# Patient Record
Sex: FEMALE | Race: WHITE | NOT HISPANIC OR LATINO | Employment: OTHER | URBAN - METROPOLITAN AREA
[De-identification: names, ages, dates, MRNs, and addresses within clinical notes are randomized per-mention and may not be internally consistent; named-entity substitution may affect disease eponyms.]

---

## 2023-02-19 PROBLEM — I10 BENIGN ESSENTIAL HYPERTENSION: Status: ACTIVE | Noted: 2023-02-19

## 2023-02-19 RX ORDER — CYCLOBENZAPRINE HCL 5 MG
1 TABLET ORAL EVERY 8 HOURS PRN
COMMUNITY
Start: 2020-10-15 | End: 2024-02-15 | Stop reason: ALTCHOICE

## 2023-02-19 RX ORDER — ONDANSETRON 4 MG/1
1 TABLET, FILM COATED ORAL EVERY 8 HOURS PRN
COMMUNITY
Start: 2022-08-25 | End: 2024-05-31 | Stop reason: SDUPTHER

## 2023-02-19 RX ORDER — DULOXETIN HYDROCHLORIDE 20 MG/1
1 CAPSULE, DELAYED RELEASE ORAL DAILY
COMMUNITY
Start: 2022-11-28 | End: 2023-03-21 | Stop reason: SDUPTHER

## 2023-02-19 RX ORDER — IBUPROFEN 200 MG
1 TABLET ORAL DAILY
COMMUNITY
Start: 2016-02-04 | End: 2023-03-21

## 2023-02-19 RX ORDER — BLOOD SUGAR DIAGNOSTIC
STRIP MISCELLANEOUS
COMMUNITY
Start: 2022-04-20 | End: 2023-08-17 | Stop reason: SDUPTHER

## 2023-02-19 RX ORDER — ATORVASTATIN CALCIUM 40 MG/1
1 TABLET, FILM COATED ORAL NIGHTLY
COMMUNITY
Start: 2013-02-28 | End: 2023-03-21 | Stop reason: SDUPTHER

## 2023-02-19 RX ORDER — METOPROLOL SUCCINATE 50 MG/1
1 TABLET, EXTENDED RELEASE ORAL DAILY
COMMUNITY
Start: 2022-09-19 | End: 2023-03-16 | Stop reason: SDUPTHER

## 2023-02-19 RX ORDER — DULAGLUTIDE 1.5 MG/.5ML
INJECTION, SOLUTION SUBCUTANEOUS
COMMUNITY
Start: 2016-10-27 | End: 2023-03-21 | Stop reason: ALTCHOICE

## 2023-02-19 RX ORDER — LANCETS 28 GAUGE
EACH MISCELLANEOUS
COMMUNITY
Start: 2022-04-20 | End: 2023-08-16 | Stop reason: SDUPTHER

## 2023-02-19 RX ORDER — LOSARTAN POTASSIUM 50 MG/1
1 TABLET ORAL DAILY
COMMUNITY
Start: 2021-02-09 | End: 2023-03-21 | Stop reason: SDUPTHER

## 2023-02-23 ENCOUNTER — TELEPHONE (OUTPATIENT)
Dept: PRIMARY CARE | Facility: CLINIC | Age: 73
End: 2023-02-23
Payer: MEDICARE

## 2023-03-15 ENCOUNTER — APPOINTMENT (OUTPATIENT)
Dept: LAB | Facility: LAB | Age: 73
End: 2023-03-15
Payer: MEDICARE

## 2023-03-15 LAB
ANION GAP IN SER/PLAS: 14 MMOL/L (ref 10–20)
CALCIUM (MG/DL) IN SER/PLAS: 8.4 MG/DL (ref 8.6–10.3)
CARBON DIOXIDE, TOTAL (MMOL/L) IN SER/PLAS: 23 MMOL/L (ref 21–32)
CHLORIDE (MMOL/L) IN SER/PLAS: 103 MMOL/L (ref 98–107)
CREATININE (MG/DL) IN SER/PLAS: 0.5 MG/DL (ref 0.5–1.05)
ERYTHROCYTE DISTRIBUTION WIDTH (RATIO) BY AUTOMATED COUNT: 15.5 % (ref 11.5–14.5)
ERYTHROCYTE MEAN CORPUSCULAR HEMOGLOBIN CONCENTRATION (G/DL) BY AUTOMATED: 31 G/DL (ref 32–36)
ERYTHROCYTE MEAN CORPUSCULAR VOLUME (FL) BY AUTOMATED COUNT: 96 FL (ref 80–100)
ERYTHROCYTES (10*6/UL) IN BLOOD BY AUTOMATED COUNT: 4.59 X10E12/L (ref 4–5.2)
GFR FEMALE: >90 ML/MIN/1.73M2
GLUCOSE (MG/DL) IN SER/PLAS: 92 MG/DL (ref 74–99)
HEMATOCRIT (%) IN BLOOD BY AUTOMATED COUNT: 43.9 % (ref 36–46)
HEMOGLOBIN (G/DL) IN BLOOD: 13.6 G/DL (ref 12–16)
INR IN PPP BY COAGULATION ASSAY: 0.9 (ref 0.9–1.1)
LEUKOCYTES (10*3/UL) IN BLOOD BY AUTOMATED COUNT: 8 X10E9/L (ref 4.4–11.3)
PLATELETS (10*3/UL) IN BLOOD AUTOMATED COUNT: 307 X10E9/L (ref 150–450)
POTASSIUM (MMOL/L) IN SER/PLAS: 3.4 MMOL/L (ref 3.5–5.3)
PROTHROMBIN TIME (PT) IN PPP BY COAGULATION ASSAY: 10.9 SEC (ref 9.8–13.4)
SODIUM (MMOL/L) IN SER/PLAS: 137 MMOL/L (ref 136–145)
UREA NITROGEN (MG/DL) IN SER/PLAS: 13 MG/DL (ref 6–23)

## 2023-03-16 DIAGNOSIS — I10 BENIGN ESSENTIAL HYPERTENSION: Primary | ICD-10-CM

## 2023-03-16 LAB
ABO GROUP (TYPE) IN BLOOD: NORMAL
ANTIBODY SCREEN: NORMAL
RH FACTOR: NORMAL

## 2023-03-16 RX ORDER — METOPROLOL SUCCINATE 50 MG/1
50 TABLET, EXTENDED RELEASE ORAL DAILY
Qty: 90 TABLET | Refills: 3 | Status: SHIPPED | OUTPATIENT
Start: 2023-03-16 | End: 2023-03-21 | Stop reason: SDUPTHER

## 2023-03-16 NOTE — TELEPHONE ENCOUNTER
Next ov 3/21  Pt compliamt  Pt will be out before appt  Pt uses [facility]x [facility] [place]

## 2023-03-18 LAB — URINE CULTURE: ABNORMAL

## 2023-03-20 LAB
ABO GROUP (TYPE) IN BLOOD: NORMAL
ALANINE AMINOTRANSFERASE (SGPT) (U/L) IN SER/PLAS: 9 U/L (ref 7–45)
ALBUMIN (G/DL) IN SER/PLAS: 3.7 G/DL (ref 3.4–5)
ALKALINE PHOSPHATASE (U/L) IN SER/PLAS: 92 U/L (ref 33–136)
ANION GAP IN SER/PLAS: 11 MMOL/L (ref 10–20)
ANTIBODY SCREEN: NORMAL
ASPARTATE AMINOTRANSFERASE (SGOT) (U/L) IN SER/PLAS: 16 U/L (ref 9–39)
BASOPHILS (10*3/UL) IN BLOOD BY AUTOMATED COUNT: 0.04 X10E9/L (ref 0–0.1)
BASOPHILS/100 LEUKOCYTES IN BLOOD BY AUTOMATED COUNT: 0.4 % (ref 0–2)
BILIRUBIN TOTAL (MG/DL) IN SER/PLAS: 0.7 MG/DL (ref 0–1.2)
CALCIUM (MG/DL) IN SER/PLAS: 8.7 MG/DL (ref 8.6–10.3)
CARBON DIOXIDE, TOTAL (MMOL/L) IN SER/PLAS: 26 MMOL/L (ref 21–32)
CHLORIDE (MMOL/L) IN SER/PLAS: 106 MMOL/L (ref 98–107)
CREATININE (MG/DL) IN SER/PLAS: 0.52 MG/DL (ref 0.5–1.05)
EOSINOPHILS (10*3/UL) IN BLOOD BY AUTOMATED COUNT: 0.07 X10E9/L (ref 0–0.7)
EOSINOPHILS/100 LEUKOCYTES IN BLOOD BY AUTOMATED COUNT: 0.7 % (ref 0–6)
ERYTHROCYTE DISTRIBUTION WIDTH (RATIO) BY AUTOMATED COUNT: 15.4 % (ref 11.5–14.5)
ERYTHROCYTE MEAN CORPUSCULAR HEMOGLOBIN CONCENTRATION (G/DL) BY AUTOMATED: 31.3 G/DL (ref 32–36)
ERYTHROCYTE MEAN CORPUSCULAR VOLUME (FL) BY AUTOMATED COUNT: 96 FL (ref 80–100)
ERYTHROCYTES (10*6/UL) IN BLOOD BY AUTOMATED COUNT: 4.37 X10E12/L (ref 4–5.2)
ESTIMATED AVERAGE GLUCOSE FOR HBA1C: 140 MG/DL
GFR FEMALE: >90 ML/MIN/1.73M2
GLUCOSE (MG/DL) IN SER/PLAS: 99 MG/DL (ref 74–99)
HEMATOCRIT (%) IN BLOOD BY AUTOMATED COUNT: 41.8 % (ref 36–46)
HEMOGLOBIN (G/DL) IN BLOOD: 13.1 G/DL (ref 12–16)
HEMOGLOBIN A1C/HEMOGLOBIN TOTAL IN BLOOD: 6.5 %
IMMATURE GRANULOCYTES/100 LEUKOCYTES IN BLOOD BY AUTOMATED COUNT: 0.3 % (ref 0–0.9)
LEUKOCYTES (10*3/UL) IN BLOOD BY AUTOMATED COUNT: 10.1 X10E9/L (ref 4.4–11.3)
LYMPHOCYTES (10*3/UL) IN BLOOD BY AUTOMATED COUNT: 1.92 X10E9/L (ref 1.2–4.8)
LYMPHOCYTES/100 LEUKOCYTES IN BLOOD BY AUTOMATED COUNT: 19.1 % (ref 13–44)
MONOCYTES (10*3/UL) IN BLOOD BY AUTOMATED COUNT: 0.67 X10E9/L (ref 0.1–1)
MONOCYTES/100 LEUKOCYTES IN BLOOD BY AUTOMATED COUNT: 6.7 % (ref 2–10)
NEUTROPHILS (10*3/UL) IN BLOOD BY AUTOMATED COUNT: 7.34 X10E9/L (ref 1.2–7.7)
NEUTROPHILS/100 LEUKOCYTES IN BLOOD BY AUTOMATED COUNT: 72.8 % (ref 40–80)
PLATELETS (10*3/UL) IN BLOOD AUTOMATED COUNT: 276 X10E9/L (ref 150–450)
POTASSIUM (MMOL/L) IN SER/PLAS: 3.3 MMOL/L (ref 3.5–5.3)
PROTEIN TOTAL: 6.5 G/DL (ref 6.4–8.2)
RH FACTOR: NORMAL
SODIUM (MMOL/L) IN SER/PLAS: 140 MMOL/L (ref 136–145)
UREA NITROGEN (MG/DL) IN SER/PLAS: 10 MG/DL (ref 6–23)

## 2023-03-21 ENCOUNTER — OFFICE VISIT (OUTPATIENT)
Dept: PRIMARY CARE | Facility: CLINIC | Age: 73
End: 2023-03-21
Payer: MEDICARE

## 2023-03-21 VITALS
WEIGHT: 118 LBS | DIASTOLIC BLOOD PRESSURE: 78 MMHG | TEMPERATURE: 97 F | OXYGEN SATURATION: 100 % | SYSTOLIC BLOOD PRESSURE: 130 MMHG | HEART RATE: 74 BPM | BODY MASS INDEX: 19.94 KG/M2

## 2023-03-21 DIAGNOSIS — K21.9 GASTROESOPHAGEAL REFLUX DISEASE WITHOUT ESOPHAGITIS: ICD-10-CM

## 2023-03-21 DIAGNOSIS — I10 BENIGN ESSENTIAL HYPERTENSION: ICD-10-CM

## 2023-03-21 DIAGNOSIS — F32.A DEPRESSIVE DISORDER: ICD-10-CM

## 2023-03-21 DIAGNOSIS — E11.9 TYPE 2 DIABETES MELLITUS WITHOUT COMPLICATION, WITH LONG-TERM CURRENT USE OF INSULIN (MULTI): ICD-10-CM

## 2023-03-21 DIAGNOSIS — J44.9 CHRONIC OBSTRUCTIVE PULMONARY DISEASE, UNSPECIFIED COPD TYPE (MULTI): ICD-10-CM

## 2023-03-21 DIAGNOSIS — Z79.4 TYPE 2 DIABETES MELLITUS WITHOUT COMPLICATION, WITH LONG-TERM CURRENT USE OF INSULIN (MULTI): ICD-10-CM

## 2023-03-21 DIAGNOSIS — E46 PROTEIN-CALORIE MALNUTRITION, UNSPECIFIED SEVERITY (MULTI): Primary | ICD-10-CM

## 2023-03-21 DIAGNOSIS — E78.2 MIXED HYPERLIPIDEMIA: ICD-10-CM

## 2023-03-21 PROCEDURE — 3075F SYST BP GE 130 - 139MM HG: CPT | Performed by: FAMILY MEDICINE

## 2023-03-21 PROCEDURE — 4010F ACE/ARB THERAPY RXD/TAKEN: CPT | Performed by: FAMILY MEDICINE

## 2023-03-21 PROCEDURE — 1159F MED LIST DOCD IN RCRD: CPT | Performed by: FAMILY MEDICINE

## 2023-03-21 PROCEDURE — 3078F DIAST BP <80 MM HG: CPT | Performed by: FAMILY MEDICINE

## 2023-03-21 PROCEDURE — 1160F RVW MEDS BY RX/DR IN RCRD: CPT | Performed by: FAMILY MEDICINE

## 2023-03-21 PROCEDURE — 99214 OFFICE O/P EST MOD 30 MIN: CPT | Performed by: FAMILY MEDICINE

## 2023-03-21 PROCEDURE — 3044F HG A1C LEVEL LT 7.0%: CPT | Performed by: FAMILY MEDICINE

## 2023-03-21 RX ORDER — LOSARTAN POTASSIUM 50 MG/1
50 TABLET ORAL DAILY
Qty: 90 TABLET | Refills: 3 | Status: SHIPPED | OUTPATIENT
Start: 2023-03-21 | End: 2024-01-22 | Stop reason: SDUPTHER

## 2023-03-21 RX ORDER — DULOXETIN HYDROCHLORIDE 20 MG/1
20 CAPSULE, DELAYED RELEASE ORAL DAILY
Qty: 90 CAPSULE | Refills: 3 | Status: SHIPPED | OUTPATIENT
Start: 2023-03-21 | End: 2023-05-19 | Stop reason: SDUPTHER

## 2023-03-21 RX ORDER — OMEPRAZOLE 20 MG/1
20 TABLET, DELAYED RELEASE ORAL
Qty: 90 TABLET | Refills: 3 | Status: SHIPPED | OUTPATIENT
Start: 2023-03-21 | End: 2024-05-29 | Stop reason: SDUPTHER

## 2023-03-21 RX ORDER — METOPROLOL SUCCINATE 50 MG/1
50 TABLET, EXTENDED RELEASE ORAL DAILY
Qty: 90 TABLET | Refills: 3 | Status: SHIPPED | OUTPATIENT
Start: 2023-03-21 | End: 2024-05-29 | Stop reason: SDUPTHER

## 2023-03-21 RX ORDER — ATORVASTATIN CALCIUM 40 MG/1
40 TABLET, FILM COATED ORAL DAILY
Qty: 90 TABLET | Refills: 3 | Status: SHIPPED | OUTPATIENT
Start: 2023-03-21 | End: 2024-01-22 | Stop reason: SDUPTHER

## 2023-03-21 RX ORDER — INSULIN GLARGINE 100 [IU]/ML
20 INJECTION, SOLUTION SUBCUTANEOUS NIGHTLY
Qty: 18 ML | Refills: 3 | Status: SHIPPED | OUTPATIENT
Start: 2023-03-21 | End: 2024-01-22 | Stop reason: ALTCHOICE

## 2023-03-21 RX ORDER — OMEPRAZOLE 20 MG/1
TABLET, DELAYED RELEASE ORAL
COMMUNITY
End: 2023-03-21 | Stop reason: SDUPTHER

## 2023-03-21 ASSESSMENT — PATIENT HEALTH QUESTIONNAIRE - PHQ9
SUM OF ALL RESPONSES TO PHQ9 QUESTIONS 1 AND 2: 0
1. LITTLE INTEREST OR PLEASURE IN DOING THINGS: NOT AT ALL
2. FEELING DOWN, DEPRESSED OR HOPELESS: NOT AT ALL

## 2023-03-21 NOTE — LETTER
Please dispense one handicapped placard for pt.  She struggles with arthritis and back pain chronically.  Expires in 5 years.    Sincerely,      Christopher Sam MD

## 2023-03-21 NOTE — PROGRESS NOTES
Subjective   Patient ID: Nina Oquendo is a 72 y.o. female who presents for Diabetes and Hypertension (Review labs.).  HPI  1. DM: a1c was 6.5.  Keeps losing weight.  Now close to underweight.    2. Lipids: denies chest pain. taking medication.     3. Tobacco use: still smoking    4. HTN: well-controlled with medication .does not measure at home.     5. Adrenal carcinoma: now cancer free      Current Outpatient Medications on File Prior to Visit   Medication Sig Dispense Refill    atorvastatin (Lipitor) 40 mg tablet Take 1 tablet (40 mg) by mouth once daily at bedtime.      blood sugar diagnostic (Prodigy No Coding) strip Prodigy No coding blood glucose test strips  Use to check 2x a day      cyclobenzaprine (Flexeril) 5 mg tablet Take 1 tablet (5 mg) by mouth every 8 hours if needed.      dulaglutide (Trulicity) 1.5 mg/0.5 mL pen injector Inject under the skin 1 (one) time per week. 1 pen injection      DULoxetine (Cymbalta) 20 mg DR capsule Take 1 capsule (20 mg) by mouth once daily.      empagliflozin (Jardiance) 25 mg Take 1 tablet (25 mg) by mouth once daily. Jardiance 25 mg oral tablets      FreeStyle lancets 28 gauge Prodigy Pressure activated safety lancets 28G  To check 2x a day, 3 mo supply      losartan (Cozaar) 50 mg tablet Take 1 tablet (50 mg) by mouth once daily.      metoprolol succinate XL (Toprol-XL) 50 mg 24 hr tablet Take 1 tablet (50 mg) by mouth once daily. 90 tablet 3    ondansetron (Zofran) 4 mg tablet Take 1 tablet (4 mg) by mouth every 8 hours if needed for nausea.      nicotine (Nicoderm CQ) 21 mg/24 hr patch Place 1 patch on the skin once daily.      omeprazole OTC (PriLOSEC OTC) 20 mg EC tablet Take by mouth.      [DISCONTINUED] metoprolol succinate XL (Toprol-XL) 50 mg 24 hr tablet Take 1 tablet (50 mg) by mouth once daily.       No current facility-administered medications on file prior to visit.        Review of Systems   All other systems reviewed and are negative.      Objective    Physical Exam  Constitutional:       Appearance: Normal appearance. She is well-developed.   HENT:      Head: Atraumatic.   Cardiovascular:      Rate and Rhythm: Normal rate and regular rhythm.      Heart sounds: Normal heart sounds. No murmur heard.  Pulmonary:      Effort: Pulmonary effort is normal.      Breath sounds: Normal breath sounds.   Abdominal:      General: Bowel sounds are normal.      Palpations: Abdomen is soft.   Skin:     General: Skin is warm.   Neurological:      General: No focal deficit present.      Mental Status: She is alert.   Psychiatric:         Mood and Affect: Mood normal.         Orders Only on 03/20/2023   Component Date Value Ref Range Status    ABO GROUP (TYPE) IN BLOOD 03/20/2023 CANCELED   Final-Edited    Result canceled by the ancillary.    Rh 03/20/2023 CANCELED   Final-Edited    Result canceled by the ancillary.    ANTIBODY SCREEN 03/20/2023 CANCELED   Final-Edited    Result canceled by the ancillary.   Orders Only on 03/20/2023   Component Date Value Ref Range Status    WBC 03/20/2023 10.1  4.4 - 11.3 x10E9/L Final    RBC 03/20/2023 4.37  4.00 - 5.20 x10E12/L Final    Hemoglobin 03/20/2023 13.1  12.0 - 16.0 g/dL Final    Hematocrit 03/20/2023 41.8  36.0 - 46.0 % Final    MCV 03/20/2023 96  80 - 100 fL Final    MCHC 03/20/2023 31.3 (L)  32.0 - 36.0 g/dL Final    Platelets 03/20/2023 276  150 - 450 x10E9/L Final    RDW 03/20/2023 15.4 (H)  11.5 - 14.5 % Final    Neutrophils % 03/20/2023 72.8  40.0 - 80.0 % Final    Immature Granulocytes %, Automated 03/20/2023 0.3  0.0 - 0.9 % Final    Comment:  Immature Granulocyte Count (IG) includes promyelocytes,    myelocytes and metamyelocytes but does not include bands.   Percent differential counts (%) should be interpreted in the   context of the absolute cell counts (cells/L).      Lymphocytes % 03/20/2023 19.1  13.0 - 44.0 % Final    Monocytes % 03/20/2023 6.7  2.0 - 10.0 % Final    Eosinophils % 03/20/2023 0.7  0.0 - 6.0 % Final     Basophils % 03/20/2023 0.4  0.0 - 2.0 % Final    Neutrophils Absolute 03/20/2023 7.34  1.20 - 7.70 x10E9/L Final    Lymphocytes Absolute 03/20/2023 1.92  1.20 - 4.80 x10E9/L Final    Monocytes Absolute 03/20/2023 0.67  0.10 - 1.00 x10E9/L Final    Eosinophils Absolute 03/20/2023 0.07  0.00 - 0.70 x10E9/L Final    Basophils Absolute 03/20/2023 0.04  0.00 - 0.10 x10E9/L Final   Orders Only on 03/20/2023   Component Date Value Ref Range Status    Glucose 03/20/2023 99  74 - 99 mg/dL Final    Sodium 03/20/2023 140  136 - 145 mmol/L Final    Potassium 03/20/2023 3.3 (L)  3.5 - 5.3 mmol/L Final    Chloride 03/20/2023 106  98 - 107 mmol/L Final    Bicarbonate 03/20/2023 26  21 - 32 mmol/L Final    Anion Gap 03/20/2023 11  10 - 20 mmol/L Final    Urea Nitrogen 03/20/2023 10  6 - 23 mg/dL Final    Creatinine 03/20/2023 0.52  0.50 - 1.05 mg/dL Final    GFR Female 03/20/2023 >90  >90 mL/min/1.73m2 Final    Comment:  CALCULATIONS OF ESTIMATED GFR ARE PERFORMED   USING THE 2021 CKD-EPI STUDY REFIT EQUATION   WITHOUT THE RACE VARIABLE FOR THE IDMS-TRACEABLE   CREATININE METHODS.    https://jasn.asnjournals.org/content/early/2021/09/22/ASN.1805257423      Calcium 03/20/2023 8.7  8.6 - 10.3 mg/dL Final    Albumin 03/20/2023 3.7  3.4 - 5.0 g/dL Final    Alkaline Phosphatase 03/20/2023 92  33 - 136 U/L Final    Total Protein 03/20/2023 6.5  6.4 - 8.2 g/dL Final    AST 03/20/2023 16  9 - 39 U/L Final    Total Bilirubin 03/20/2023 0.7  0.0 - 1.2 mg/dL Final    ALT (SGPT) 03/20/2023 9  7 - 45 U/L Final    Comment:  Patients treated with Sulfasalazine may generate    falsely decreased results for ALT.     Orders Only on 03/20/2023   Component Date Value Ref Range Status    Hemoglobin A1C 03/20/2023 6.5 (A)  % Final    Comment:      Diagnosis of Diabetes-Adults   Non-Diabetic: < or = 5.6%   Increased risk for developing diabetes: 5.7-6.4%   Diagnostic of diabetes: > or = 6.5%  .       Monitoring of Diabetes                Age (y)      Therapeutic Goal (%)   Adults:          >18           <7.0   Pediatrics:    13-18           <7.5                   7-12           <8.0                   0- 6            7.5-8.5   American Diabetes Association. Diabetes Care 33(S1), Jan 2010.      Estimated Average Glucose 03/20/2023 140  MG/DL Final   Orders Only on 03/15/2023   Component Date Value Ref Range Status    ABO GROUP (TYPE) IN BLOOD 03/15/2023 CANCELED   Final-Edited    Comment: no phleb id on tube.... cxn left message to dr. gupta office, 03/16/2023   08:08    Result canceled by the ancillary.      Rh 03/15/2023 CANCELED   Final-Edited    Result canceled by the ancillary.    ANTIBODY SCREEN 03/15/2023 CANCELED   Final-Edited    Result canceled by the ancillary.   Orders Only on 03/15/2023   Component Date Value Ref Range Status    Urine Culture 03/15/2023 Aerococcus urinae (A)   Final    >100,000 CFU/ML   Orders Only on 03/15/2023   Component Date Value Ref Range Status    WBC 03/15/2023 8.0  4.4 - 11.3 x10E9/L Final    RBC 03/15/2023 4.59  4.00 - 5.20 x10E12/L Final    Hemoglobin 03/15/2023 13.6  12.0 - 16.0 g/dL Final    Hematocrit 03/15/2023 43.9  36.0 - 46.0 % Final    MCV 03/15/2023 96  80 - 100 fL Final    MCHC 03/15/2023 31.0 (L)  32.0 - 36.0 g/dL Final    Platelets 03/15/2023 307  150 - 450 x10E9/L Final    RDW 03/15/2023 15.5 (H)  11.5 - 14.5 % Final   Orders Only on 03/15/2023   Component Date Value Ref Range Status    Protime 03/15/2023 10.9  9.8 - 13.4 sec Final    INR 03/15/2023 0.9  0.9 - 1.1 Final   Orders Only on 03/15/2023   Component Date Value Ref Range Status    Glucose 03/15/2023 92  74 - 99 mg/dL Final    Sodium 03/15/2023 137  136 - 145 mmol/L Final    Potassium 03/15/2023 3.4 (L)  3.5 - 5.3 mmol/L Final    Chloride 03/15/2023 103  98 - 107 mmol/L Final    Bicarbonate 03/15/2023 23  21 - 32 mmol/L Final    Anion Gap 03/15/2023 14  10 - 20 mmol/L Final    Urea Nitrogen 03/15/2023 13  6 - 23 mg/dL Final    Creatinine 03/15/2023  0.50  0.50 - 1.05 mg/dL Final    GFR Female 03/15/2023 >90  >90 mL/min/1.73m2 Final    Comment:  CALCULATIONS OF ESTIMATED GFR ARE PERFORMED   USING THE 2021 CKD-EPI STUDY REFIT EQUATION   WITHOUT THE RACE VARIABLE FOR THE IDMS-TRACEABLE   CREATININE METHODS.    https://jasn.asnjournals.org/content/early/2021/09/22/ASN.1129372586      Calcium 03/15/2023 8.4 (L)  8.6 - 10.3 mg/dL Final   [facility] Encounter on 02/16/2023   Component Date Value Ref Range Status    Cortisol/Creatinine Ratio 02/16/2023 56.25  ug/g CRT Final    Comment: Reference Interval: Cortisol ug/g crt  Female  Prepubertal: Less than 25 ug/g crt  18 years and older: Less than 24 ug/g crt  Pregnancy: Less than 59 ug/g crt  Male  Prepubertal: Less than 25 ug/g crt  18 years and older: Less than 32 ug/g crt      Cortisol UR Free Per Volume 02/16/2023 40.50  ug/L Final    Cortisol UR Free Per 24H 02/16/2023 43.7  <=45.0 ug/d Final    Cortisol, Urine Intepretation 02/16/2023 See Note   Final    Comment: INTERPRETIVE INFORMATION: Cortisol Urine Free by LC-MS/MS  Access complete set of age- and/or gender-specific reference   intervals for this test in the [facility] Test Directory   ([facility]).  This test was developed and its performance characteristics   determined by [facility]. It has not been cleared or   approved by the US Food and Drug Administration. This test was   performed in a CLIA certified laboratory and is intended for   clinical purposes.  Performed by [facility],                              [address] [place] [id number] [phone]                    [email or web address], Mateo Oliveros MD, PHD - Lab. Director      Creatinine, Urine - per volume 02/16/2023 72  mg/dL Final    Creatinine, 24H Ur 02/16/2023 776  500 - 1,400 mg/d Final    Collection Interval, Ur 02/16/2023 24   Final    Total Volume 02/16/2023 1,078   Final    Collection Interval, Ur 02/16/2023 24  hr Final    Urine Volume 02/16/2023 1,078  mL Final     CREATININE (MG/DL) IN URINE 02/16/2023 65.1  20.0 - 320.0 mg/dL Final    Creatinine, 24H Ur 02/16/2023 0.70  0.67 - 1.59 g/24h Final   There may be more visits with results that are not included.       Assessment/Plan   Problem List Items Addressed This Visit          Respiratory    Chronic obstructive lung disease (CMS/[facility])       Circulatory    Benign essential hypertension    Relevant Medications    metoprolol succinate XL (Toprol-XL) 50 mg 24 hr tablet    losartan (Cozaar) 50 mg tablet       Endocrine/Metabolic    Type 2 diabetes mellitus (CMS/[facility])    Relevant Medications    insulin glargine (Lantus Solostar U-100 Insulin) 100 unit/mL (3 mL) pen    Other Relevant Orders    Hemoglobin A1C    Comprehensive Metabolic Panel    Lipid Panel    Protein-calorie malnutrition, unspecified severity (CMS/[facility]) - Primary       Other    Hyperlipidemia    Relevant Medications    atorvastatin (Lipitor) 40 mg tablet    Depressive disorder    Relevant Medications    DULoxetine (Cymbalta) 20 mg DR capsule     Other Visit Diagnoses       Gastroesophageal reflux disease without esophagitis        Relevant Medications    omeprazole OTC (PriLOSEC OTC) 20 mg EC tablet          Patient's diabetes is well controlled by now.  I will hold the Jardiance and Trulicity to see if we can help her to gain weight.  If blood sugars are climbing we will add on basal insulin and titrate upwards.  Follow-up in 3 months

## 2023-03-23 ENCOUNTER — TELEPHONE (OUTPATIENT)
Dept: PRIMARY CARE | Facility: CLINIC | Age: 73
End: 2023-03-23
Payer: MEDICARE

## 2023-03-23 DIAGNOSIS — Z79.4 TYPE 2 DIABETES MELLITUS WITHOUT COMPLICATION, WITH LONG-TERM CURRENT USE OF INSULIN (MULTI): Primary | ICD-10-CM

## 2023-03-23 DIAGNOSIS — E11.9 TYPE 2 DIABETES MELLITUS WITHOUT COMPLICATION, WITH LONG-TERM CURRENT USE OF INSULIN (MULTI): Primary | ICD-10-CM

## 2023-03-23 RX ORDER — PEN NEEDLE, DIABETIC 30 GX3/16"
1 NEEDLE, DISPOSABLE MISCELLANEOUS DAILY
Qty: 100 EACH | Refills: 3 | Status: SHIPPED | OUTPATIENT
Start: 2023-03-23 | End: 2024-03-22

## 2023-03-23 NOTE — TELEPHONE ENCOUNTER
Patient states she needs Rx sent in for her Pen needles for Lantus Insulin. Needs sent to [facility].

## 2023-03-24 ENCOUNTER — TELEPHONE (OUTPATIENT)
Dept: PRIMARY CARE | Facility: CLINIC | Age: 73
End: 2023-03-24
Payer: MEDICARE

## 2023-03-24 NOTE — TELEPHONE ENCOUNTER
Pt called in stating she is unable to  her new RX in place of her Jardiance until Monday due to not having the money, pt is asking if she can still take the Jardiance in case her sugar drops until she can get the med? Pt aware YINKA is out of the office and is asking if another Dr can advise. AM

## 2023-03-27 ENCOUNTER — TELEPHONE (OUTPATIENT)
Dept: PRIMARY CARE | Facility: CLINIC | Age: 73
End: 2023-03-27
Payer: MEDICARE

## 2023-03-27 NOTE — TELEPHONE ENCOUNTER
Pt called back wanting ot know if bs IS 88 IN am, then what does she do? Please advise Suhas LYLES

## 2023-03-27 NOTE — TELEPHONE ENCOUNTER
Patient is confused on how to take her Lantus Solostar. Was informed to increase her insulin by 1 unit aday until her sugars get to 130. But when should she test her sugars because in the morning it is usually low? Need some one to go over the instructions with her.

## 2023-04-18 ENCOUNTER — TELEPHONE (OUTPATIENT)
Dept: PRIMARY CARE | Facility: CLINIC | Age: 73
End: 2023-04-18
Payer: MEDICARE

## 2023-04-18 NOTE — TELEPHONE ENCOUNTER
Pt called stating new insulin is not working and would like ot be put back on trulicity and jardiance. She states since taking new insulin her BS are all over the place... please advise Suhas LYLES

## 2023-05-19 DIAGNOSIS — F32.A DEPRESSIVE DISORDER: ICD-10-CM

## 2023-05-19 NOTE — TELEPHONE ENCOUNTER
Pt called rx line @ 10:23am requesting RF on Duloxetine HCL 20mg.    Next OV 6/21/23  Please advise./JW

## 2023-05-20 RX ORDER — DULOXETIN HYDROCHLORIDE 20 MG/1
20 CAPSULE, DELAYED RELEASE ORAL DAILY
Qty: 90 CAPSULE | Refills: 3 | Status: SHIPPED | OUTPATIENT
Start: 2023-05-20 | End: 2024-01-22 | Stop reason: ALTCHOICE

## 2023-06-13 ENCOUNTER — LAB (OUTPATIENT)
Dept: LAB | Facility: LAB | Age: 73
End: 2023-06-13
Payer: MEDICARE

## 2023-06-13 DIAGNOSIS — E11.9 TYPE 2 DIABETES MELLITUS WITHOUT COMPLICATION, WITH LONG-TERM CURRENT USE OF INSULIN (MULTI): ICD-10-CM

## 2023-06-13 DIAGNOSIS — Z79.4 TYPE 2 DIABETES MELLITUS WITHOUT COMPLICATION, WITH LONG-TERM CURRENT USE OF INSULIN (MULTI): ICD-10-CM

## 2023-06-13 LAB
ALANINE AMINOTRANSFERASE (SGPT) (U/L) IN SER/PLAS: 20 U/L (ref 7–45)
ALBUMIN (G/DL) IN SER/PLAS: 4.1 G/DL (ref 3.4–5)
ALKALINE PHOSPHATASE (U/L) IN SER/PLAS: 103 U/L (ref 33–136)
ANION GAP IN SER/PLAS: 10 MMOL/L (ref 10–20)
ASPARTATE AMINOTRANSFERASE (SGOT) (U/L) IN SER/PLAS: 18 U/L (ref 9–39)
BILIRUBIN TOTAL (MG/DL) IN SER/PLAS: 0.5 MG/DL (ref 0–1.2)
CALCIUM (MG/DL) IN SER/PLAS: 9.6 MG/DL (ref 8.6–10.3)
CARBON DIOXIDE, TOTAL (MMOL/L) IN SER/PLAS: 29 MMOL/L (ref 21–32)
CHLORIDE (MMOL/L) IN SER/PLAS: 107 MMOL/L (ref 98–107)
CHOLESTEROL (MG/DL) IN SER/PLAS: 135 MG/DL (ref 0–199)
CHOLESTEROL IN HDL (MG/DL) IN SER/PLAS: 61.7 MG/DL
CHOLESTEROL/HDL RATIO: 2.2
CREATININE (MG/DL) IN SER/PLAS: 0.71 MG/DL (ref 0.5–1.05)
ESTIMATED AVERAGE GLUCOSE FOR HBA1C: 157 MG/DL
GFR FEMALE: >90 ML/MIN/1.73M2
GLUCOSE (MG/DL) IN SER/PLAS: 165 MG/DL (ref 74–99)
HEMOGLOBIN A1C/HEMOGLOBIN TOTAL IN BLOOD: 7.1 %
LDL: 59 MG/DL (ref 0–99)
POTASSIUM (MMOL/L) IN SER/PLAS: 4.4 MMOL/L (ref 3.5–5.3)
PROTEIN TOTAL: 6.6 G/DL (ref 6.4–8.2)
SODIUM (MMOL/L) IN SER/PLAS: 142 MMOL/L (ref 136–145)
TRIGLYCERIDE (MG/DL) IN SER/PLAS: 73 MG/DL (ref 0–149)
UREA NITROGEN (MG/DL) IN SER/PLAS: 17 MG/DL (ref 6–23)
VLDL: 15 MG/DL (ref 0–40)

## 2023-06-13 PROCEDURE — 83036 HEMOGLOBIN GLYCOSYLATED A1C: CPT

## 2023-06-13 PROCEDURE — 80061 LIPID PANEL: CPT

## 2023-06-13 PROCEDURE — 36415 COLL VENOUS BLD VENIPUNCTURE: CPT

## 2023-06-13 PROCEDURE — 80053 COMPREHEN METABOLIC PANEL: CPT

## 2023-06-21 ENCOUNTER — OFFICE VISIT (OUTPATIENT)
Dept: PRIMARY CARE | Facility: CLINIC | Age: 73
End: 2023-06-21
Payer: MEDICARE

## 2023-06-21 VITALS
SYSTOLIC BLOOD PRESSURE: 110 MMHG | BODY MASS INDEX: 19.66 KG/M2 | DIASTOLIC BLOOD PRESSURE: 72 MMHG | HEART RATE: 80 BPM | HEIGHT: 65 IN | WEIGHT: 118 LBS | OXYGEN SATURATION: 97 % | TEMPERATURE: 97.2 F

## 2023-06-21 DIAGNOSIS — F32.A DEPRESSIVE DISORDER: ICD-10-CM

## 2023-06-21 DIAGNOSIS — E78.2 MIXED HYPERLIPIDEMIA: ICD-10-CM

## 2023-06-21 DIAGNOSIS — Z79.4 TYPE 2 DIABETES MELLITUS WITHOUT COMPLICATION, WITH LONG-TERM CURRENT USE OF INSULIN (MULTI): ICD-10-CM

## 2023-06-21 DIAGNOSIS — J44.9 CHRONIC OBSTRUCTIVE PULMONARY DISEASE, UNSPECIFIED COPD TYPE (MULTI): ICD-10-CM

## 2023-06-21 DIAGNOSIS — Z00.00 ROUTINE ADULT HEALTH MAINTENANCE: ICD-10-CM

## 2023-06-21 DIAGNOSIS — I10 ESSENTIAL HYPERTENSION: ICD-10-CM

## 2023-06-21 DIAGNOSIS — E11.9 TYPE 2 DIABETES MELLITUS WITHOUT COMPLICATION, WITH LONG-TERM CURRENT USE OF INSULIN (MULTI): ICD-10-CM

## 2023-06-21 DIAGNOSIS — Z00.00 MEDICARE ANNUAL WELLNESS VISIT, SUBSEQUENT: Primary | ICD-10-CM

## 2023-06-21 PROCEDURE — 1159F MED LIST DOCD IN RCRD: CPT | Performed by: FAMILY MEDICINE

## 2023-06-21 PROCEDURE — 3051F HG A1C>EQUAL 7.0%<8.0%: CPT | Performed by: FAMILY MEDICINE

## 2023-06-21 PROCEDURE — 4010F ACE/ARB THERAPY RXD/TAKEN: CPT | Performed by: FAMILY MEDICINE

## 2023-06-21 PROCEDURE — G0439 PPPS, SUBSEQ VISIT: HCPCS | Performed by: FAMILY MEDICINE

## 2023-06-21 PROCEDURE — 1170F FXNL STATUS ASSESSED: CPT | Performed by: FAMILY MEDICINE

## 2023-06-21 PROCEDURE — 99214 OFFICE O/P EST MOD 30 MIN: CPT | Performed by: FAMILY MEDICINE

## 2023-06-21 PROCEDURE — 3074F SYST BP LT 130 MM HG: CPT | Performed by: FAMILY MEDICINE

## 2023-06-21 PROCEDURE — 1160F RVW MEDS BY RX/DR IN RCRD: CPT | Performed by: FAMILY MEDICINE

## 2023-06-21 PROCEDURE — 3078F DIAST BP <80 MM HG: CPT | Performed by: FAMILY MEDICINE

## 2023-06-21 ASSESSMENT — PATIENT HEALTH QUESTIONNAIRE - PHQ9
1. LITTLE INTEREST OR PLEASURE IN DOING THINGS: NOT AT ALL
2. FEELING DOWN, DEPRESSED OR HOPELESS: NOT AT ALL
SUM OF ALL RESPONSES TO PHQ9 QUESTIONS 1 AND 2: 0

## 2023-06-21 ASSESSMENT — ACTIVITIES OF DAILY LIVING (ADL)
DOING_HOUSEWORK: INDEPENDENT
BATHING: INDEPENDENT
DRESSING: INDEPENDENT
GROCERY_SHOPPING: INDEPENDENT
TAKING_MEDICATION: INDEPENDENT
MANAGING_FINANCES: INDEPENDENT

## 2023-06-21 NOTE — PROGRESS NOTES
Subjective   Patient ID: Nina Oquendo is a 72 y.o. female who presents for Medicare Annual Wellness Visit Subsequent and Diabetes (Recheck. Review labs.).  Diabetes        1. DM: a1c was 7.1.  Keeps losing weight.      2. Lipids: denies chest pain. taking medication.     3. Tobacco use: still smoking    4. HTN: well-controlled with medication .does not measure at home.     5. Adrenal carcinoma: now cancer free.  Getting fu CT in July.        MiniCO/5     Counseled pt on living will: has living will    CV screening: CA score was low in     Colonoscopy: needs another on     Diabetes screening: a1c was 7.1.    Glaucoma screen: rec'ed seeing optho for her DM2    CT chest tob screen: screening CT ordered last year but didn't do it.    Mammo: ok in , ordered again    DEXA: Osteopenia in     PAP/pelvis: s/p hysterectomy    Vaccines:  had flu, PNA shots. COVID boosted    Past Medical History:   Diagnosis Date    Type 2 diabetes mellitus without complications (CMS/[facility]) 2013    Diabetes mellitus       Social Connections: Not on file       Current Outpatient Medications on File Prior to Visit   Medication Sig Dispense Refill    atorvastatin (Lipitor) 40 mg tablet Take 1 tablet (40 mg) by mouth once daily. 90 tablet 3    blood sugar diagnostic (Prodigy No Coding) strip Prodigy No coding blood glucose test strips  Use to check 2x a day      cyclobenzaprine (Flexeril) 5 mg tablet Take 1 tablet (5 mg) by mouth every 8 hours if needed.      DULoxetine (Cymbalta) 20 mg DR capsule Take 1 capsule (20 mg) by mouth once daily. 90 capsule 3    FreeStyle lancets 28 gauge Prodigy Pressure activated safety lancets 28G  To check 2x a day, 3 mo supply      insulin glargine (Lantus Solostar U-100 Insulin) 100 unit/mL (3 mL) pen Inject 20 Units under the skin once daily at bedtime. Take as directed per insulin instructions. 18 mL 3    losartan (Cozaar) 50 mg tablet Take 1 tablet (50 mg) by mouth once daily. 90 tablet  "3    metoprolol succinate XL (Toprol-XL) 50 mg 24 hr tablet Take 1 tablet (50 mg) by mouth once daily. 90 tablet 3    omeprazole OTC (PriLOSEC OTC) 20 mg EC tablet Take 1 tablet (20 mg) by mouth once daily in the morning. Take before meals. 90 tablet 3    ondansetron (Zofran) 4 mg tablet Take 1 tablet (4 mg) by mouth every 8 hours if needed for nausea.      pen needle, diabetic 31 gauge x 3/16\" needle 1 each once daily. 100 each 3     No current facility-administered medications on file prior to visit.        Vitals:    06/21/23 1109   BP: 110/72   Pulse: 80   Temp: 36.2 °C (97.2 °F)   SpO2: 97%     Vitals:    06/21/23 1109   Weight: 53.5 kg (118 lb)         Review of Systems   All other systems reviewed and are negative.      Objective     Physical Exam  Vitals reviewed.   Constitutional:       General: She is not in acute distress.     Appearance: Normal appearance. She is well-developed. She is not diaphoretic.   HENT:      Head: Normocephalic and atraumatic.      Right Ear: Tympanic membrane normal.      Left Ear: Tympanic membrane normal.      Nose: Nose normal.      Mouth/Throat:      Mouth: Mucous membranes are moist.   Eyes:      Pupils: Pupils are equal, round, and reactive to light.   Cardiovascular:      Rate and Rhythm: Normal rate and regular rhythm.      Heart sounds: Normal heart sounds. No murmur heard.     No friction rub. No gallop.   Pulmonary:      Effort: Pulmonary effort is normal.      Breath sounds: Normal breath sounds. No rales.   Abdominal:      General: Bowel sounds are normal.      Palpations: Abdomen is soft.      Tenderness: There is no abdominal tenderness.   Musculoskeletal:      Cervical back: Normal range of motion and neck supple.   Skin:     General: Skin is warm and dry.   Neurological:      Mental Status: She is alert.   Psychiatric:         Mood and Affect: Mood normal.         Lab on 06/13/2023   Component Date Value Ref Range Status    Hemoglobin A1C 06/13/2023 7.1 (A)  % " Final         Diagnosis of Diabetes-Adults   Non-Diabetic: < or = 5.6%   Increased risk for developing diabetes: 5.7-6.4%   Diagnostic of diabetes: > or = 6.5%  .       Monitoring of Diabetes                Age (y)     Therapeutic Goal (%)   Adults:          >18           <7.0   Pediatrics:    13-18           <7.5                   7-12           <8.0                   0- 6            7.5-8.5   American Diabetes Association. Diabetes Care 33(S1), Jan 2010.    Estimated Average Glucose 06/13/2023 157  MG/DL Final    Glucose 06/13/2023 165 (H)  74 - 99 mg/dL Final    Sodium 06/13/2023 142  136 - 145 mmol/L Final    Potassium 06/13/2023 4.4  3.5 - 5.3 mmol/L Final    Chloride 06/13/2023 107  98 - 107 mmol/L Final    Bicarbonate 06/13/2023 29  21 - 32 mmol/L Final    Anion Gap 06/13/2023 10  10 - 20 mmol/L Final    Urea Nitrogen 06/13/2023 17  6 - 23 mg/dL Final    Creatinine 06/13/2023 0.71  0.50 - 1.05 mg/dL Final    GFR Female 06/13/2023 >90  >90 mL/min/1.73m2 Final     CALCULATIONS OF ESTIMATED GFR ARE PERFORMED   USING THE 2021 CKD-EPI STUDY REFIT EQUATION   WITHOUT THE RACE VARIABLE FOR THE IDMS-TRACEABLE   CREATININE METHODS.    https://jasn.asnjournals.org/content/early/2021/09/22/ASN.4960105811    Calcium 06/13/2023 9.6  8.6 - 10.3 mg/dL Final    Albumin 06/13/2023 4.1  3.4 - 5.0 g/dL Final    Alkaline Phosphatase 06/13/2023 103  33 - 136 U/L Final    Total Protein 06/13/2023 6.6  6.4 - 8.2 g/dL Final    AST 06/13/2023 18  9 - 39 U/L Final    Total Bilirubin 06/13/2023 0.5  0.0 - 1.2 mg/dL Final    ALT (SGPT) 06/13/2023 20  7 - 45 U/L Final     Patients treated with Sulfasalazine may generate    falsely decreased results for ALT.    Cholesterol 06/13/2023 135  0 - 199 mg/dL Final    .      AGE      DESIRABLE   BORDERLINE HIGH   HIGH     0-19 Y     0 - 169       170 - 199     >/= 200    20-24 Y     0 - 189       190 - 224     >/= 225         >24 Y     0 - 199       200 - 239     >/= 240   **All ranges are based  on fasting samples. Specific   therapeutic targets will vary based on patient-specific   cardiac risk.  .   Pediatric guidelines reference:Pediatrics 2011, 128(S5).   Adult guidelines reference: NCEP ATPIII Guidelines,     MORIAH 2001, 258:2486-97  .   Venipuncture immediately after or during the    administration of Metamizole may lead to falsely   low results. Testing should be performed immediately   prior to Metamizole dosing.    HDL 06/13/2023 61.7  mg/dL Final    .      AGE      VERY LOW   LOW     NORMAL    HIGH       0-19 Y       < 35   < 40     40-45     ----    20-24 Y       ----   < 40       >45     ----      >24 Y       ----   < 40     40-60      >60  .    Cholesterol/HDL Ratio 06/13/2023 2.2   Final    REF VALUES  DESIRABLE  < 3.4  HIGH RISK  > 5.0    LDL 06/13/2023 59  0 - 99 mg/dL Final    .                           NEAR      BORD      AGE      DESIRABLE  OPTIMAL    HIGH     HIGH     VERY HIGH     0-19 Y     0 - 109     ---    110-129   >/= 130     ----    20-24 Y     0 - 119     ---    120-159   >/= 160     ----      >24 Y     0 -  99   100-129  130-159   160-189     >/=190  .    VLDL 06/13/2023 15  0 - 40 mg/dL Final    Triglycerides 06/13/2023 73  0 - 149 mg/dL Final    .      AGE      DESIRABLE   BORDERLINE HIGH   HIGH     VERY HIGH   0 D-90 D    19 - 174         ----         ----        ----  91 D- 9 Y     0 -  74        75 -  99     >/= 100      ----    10-19 Y     0 -  89        90 - 129     >/= 130      ----    20-24 Y     0 - 114       115 - 149     >/= 150      ----         >24 Y     0 - 149       150 - 199    200- 499    >/= 500  .   Venipuncture immediately after or during the    administration of Metamizole may lead to falsely   low results. Testing should be performed immediately   prior to Metamizole dosing.       Assessment/Plan   Problem List Items Addressed This Visit       Type 2 diabetes mellitus (CMS/HCC)    Hyperlipidemia    Chronic obstructive lung disease (CMS/HCC)    Essential  hypertension    Depressive disorder     Other Visit Diagnoses       Medicare annual wellness visit, subsequent    -  Primary    Routine adult health maintenance              Doing well.  Refilled meds.  Follow up in 4 mo.

## 2023-06-28 LAB
ABO GROUP (TYPE) IN BLOOD: NORMAL
ANION GAP IN SER/PLAS: 10 MMOL/L (ref 10–20)
ANTIBODY SCREEN: NORMAL
CALCIUM (MG/DL) IN SER/PLAS: 9.3 MG/DL (ref 8.6–10.3)
CARBON DIOXIDE, TOTAL (MMOL/L) IN SER/PLAS: 28 MMOL/L (ref 21–32)
CHLORIDE (MMOL/L) IN SER/PLAS: 106 MMOL/L (ref 98–107)
CREATININE (MG/DL) IN SER/PLAS: 0.71 MG/DL (ref 0.5–1.05)
ERYTHROCYTE DISTRIBUTION WIDTH (RATIO) BY AUTOMATED COUNT: 13.9 % (ref 11.5–14.5)
ERYTHROCYTE MEAN CORPUSCULAR HEMOGLOBIN CONCENTRATION (G/DL) BY AUTOMATED: 32.4 G/DL (ref 32–36)
ERYTHROCYTE MEAN CORPUSCULAR VOLUME (FL) BY AUTOMATED COUNT: 94 FL (ref 80–100)
ERYTHROCYTES (10*6/UL) IN BLOOD BY AUTOMATED COUNT: 4.78 X10E12/L (ref 4–5.2)
GFR FEMALE: >90 ML/MIN/1.73M2
GLUCOSE (MG/DL) IN SER/PLAS: 152 MG/DL (ref 74–99)
HEMATOCRIT (%) IN BLOOD BY AUTOMATED COUNT: 45 % (ref 36–46)
HEMOGLOBIN (G/DL) IN BLOOD: 14.6 G/DL (ref 12–16)
INR IN PPP BY COAGULATION ASSAY: 1 (ref 0.9–1.1)
LEUKOCYTES (10*3/UL) IN BLOOD BY AUTOMATED COUNT: 8 X10E9/L (ref 4.4–11.3)
PLATELETS (10*3/UL) IN BLOOD AUTOMATED COUNT: 249 X10E9/L (ref 150–450)
POTASSIUM (MMOL/L) IN SER/PLAS: 4.5 MMOL/L (ref 3.5–5.3)
PROTHROMBIN TIME (PT) IN PPP BY COAGULATION ASSAY: 10.9 SEC (ref 9.8–12.8)
RH FACTOR: NORMAL
SODIUM (MMOL/L) IN SER/PLAS: 139 MMOL/L (ref 136–145)
UREA NITROGEN (MG/DL) IN SER/PLAS: 22 MG/DL (ref 6–23)

## 2023-06-29 LAB — URINE CULTURE: ABNORMAL

## 2023-08-16 DIAGNOSIS — Z79.4 TYPE 2 DIABETES MELLITUS WITHOUT COMPLICATION, WITH LONG-TERM CURRENT USE OF INSULIN (MULTI): Primary | ICD-10-CM

## 2023-08-16 DIAGNOSIS — E11.9 TYPE 2 DIABETES MELLITUS WITHOUT COMPLICATION, WITH LONG-TERM CURRENT USE OF INSULIN (MULTI): Primary | ICD-10-CM

## 2023-08-16 RX ORDER — LANCETS 28 GAUGE
1 EACH MISCELLANEOUS 2 TIMES DAILY
Qty: 200 EACH | Refills: 3 | Status: SHIPPED | OUTPATIENT
Start: 2023-08-16 | End: 2023-08-18 | Stop reason: SDUPTHER

## 2023-08-17 RX ORDER — BLOOD SUGAR DIAGNOSTIC
STRIP MISCELLANEOUS
Qty: 200 STRIP | Refills: 3 | Status: SHIPPED | OUTPATIENT
Start: 2023-08-17 | End: 2023-08-18 | Stop reason: SDUPTHER

## 2023-08-18 DIAGNOSIS — Z79.4 TYPE 2 DIABETES MELLITUS WITHOUT COMPLICATION, WITH LONG-TERM CURRENT USE OF INSULIN (MULTI): ICD-10-CM

## 2023-08-18 DIAGNOSIS — E11.9 TYPE 2 DIABETES MELLITUS WITHOUT COMPLICATION, WITH LONG-TERM CURRENT USE OF INSULIN (MULTI): ICD-10-CM

## 2023-08-18 RX ORDER — BLOOD SUGAR DIAGNOSTIC
STRIP MISCELLANEOUS
Qty: 200 STRIP | Refills: 0 | Status: SHIPPED | OUTPATIENT
Start: 2023-08-18

## 2023-08-18 RX ORDER — LANCETS 28 GAUGE
1 EACH MISCELLANEOUS 2 TIMES DAILY
Qty: 200 EACH | Refills: 0 | Status: SHIPPED | OUTPATIENT
Start: 2023-08-18 | End: 2024-08-17

## 2023-08-18 RX ORDER — BLOOD SUGAR DIAGNOSTIC
STRIP MISCELLANEOUS
Qty: 200 STRIP | Refills: 0 | Status: SHIPPED | OUTPATIENT
Start: 2023-08-18 | End: 2023-08-18 | Stop reason: SDUPTHER

## 2023-08-18 RX ORDER — LANCETS 28 GAUGE
1 EACH MISCELLANEOUS 2 TIMES DAILY
Qty: 200 EACH | Refills: 0 | Status: SHIPPED | OUTPATIENT
Start: 2023-08-18 | End: 2023-08-18 | Stop reason: SDUPTHER

## 2023-08-18 NOTE — TELEPHONE ENCOUNTER
"Pt states [facility] has not received anything.  When looking into this med it states both were \"print\" under class and pharm never received either of these.  OK to resend? Thanks, CG   Are you able to resend for MK?  Pt would like a call when done so she does not waste another trip to the pharmacy. Thanks, CG  "

## 2023-08-24 DIAGNOSIS — E78.2 MIXED HYPERLIPIDEMIA: ICD-10-CM

## 2023-09-11 LAB
ANION GAP IN SER/PLAS: 11 MMOL/L (ref 10–20)
CALCIUM (MG/DL) IN SER/PLAS: 9.2 MG/DL (ref 8.6–10.3)
CARBON DIOXIDE, TOTAL (MMOL/L) IN SER/PLAS: 28 MMOL/L (ref 21–32)
CHLORIDE (MMOL/L) IN SER/PLAS: 108 MMOL/L (ref 98–107)
CREATININE (MG/DL) IN SER/PLAS: 0.71 MG/DL (ref 0.5–1.05)
GFR FEMALE: >90 ML/MIN/1.73M2
GLUCOSE (MG/DL) IN SER/PLAS: 116 MG/DL (ref 74–99)
POTASSIUM (MMOL/L) IN SER/PLAS: 4.7 MMOL/L (ref 3.5–5.3)
SODIUM (MMOL/L) IN SER/PLAS: 142 MMOL/L (ref 136–145)
UREA NITROGEN (MG/DL) IN SER/PLAS: 19 MG/DL (ref 6–23)

## 2023-09-12 LAB
CORTISOL (UG/DL) IN SERUM - AM: 22.8 UG/DL (ref 5–20)
DEHYDROEPIANDROSTERONE SULFATE (DHEA-S) (UG/DL) IN SER/: 18 UG/DL (ref 13–130)

## 2023-09-14 LAB — ADRENOCORTICOTROPIC HORMONE: 9.1 PG/ML (ref 7.2–63.3)

## 2023-09-15 LAB
ALDOSTERONE IN SERUM: 6.4 NG/DL
RENIN ACTIVITY: 0.4 NG/ML/HR

## 2023-09-19 LAB
METANEPHRINE: <0.2 NMOL/L
NORMETANEPHRINE: 0.65 NMOL/L

## 2023-10-16 ENCOUNTER — LAB (OUTPATIENT)
Dept: LAB | Facility: LAB | Age: 73
End: 2023-10-16
Payer: MEDICARE

## 2023-10-16 DIAGNOSIS — Z79.4 TYPE 2 DIABETES MELLITUS WITHOUT COMPLICATION, WITH LONG-TERM CURRENT USE OF INSULIN (MULTI): ICD-10-CM

## 2023-10-16 DIAGNOSIS — E11.9 TYPE 2 DIABETES MELLITUS WITHOUT COMPLICATION, WITH LONG-TERM CURRENT USE OF INSULIN (MULTI): ICD-10-CM

## 2023-10-16 LAB
ALBUMIN SERPL BCP-MCNC: 4.3 G/DL (ref 3.4–5)
ALP SERPL-CCNC: 109 U/L (ref 33–136)
ALT SERPL W P-5'-P-CCNC: 13 U/L (ref 7–45)
ANION GAP SERPL CALC-SCNC: 10 MMOL/L (ref 10–20)
AST SERPL W P-5'-P-CCNC: 17 U/L (ref 9–39)
BILIRUB SERPL-MCNC: 0.7 MG/DL (ref 0–1.2)
BUN SERPL-MCNC: 17 MG/DL (ref 6–23)
CALCIUM SERPL-MCNC: 9.4 MG/DL (ref 8.6–10.3)
CHLORIDE SERPL-SCNC: 106 MMOL/L (ref 98–107)
CHOLEST SERPL-MCNC: 159 MG/DL (ref 0–199)
CHOLESTEROL/HDL RATIO: 2.6
CO2 SERPL-SCNC: 29 MMOL/L (ref 21–32)
CREAT SERPL-MCNC: 0.67 MG/DL (ref 0.5–1.05)
EST. AVERAGE GLUCOSE BLD GHB EST-MCNC: 189 MG/DL
GFR SERPL CREATININE-BSD FRML MDRD: >90 ML/MIN/1.73M*2
GLUCOSE SERPL-MCNC: 182 MG/DL (ref 74–99)
HBA1C MFR BLD: 8.2 %
HDLC SERPL-MCNC: 61.8 MG/DL
LDLC SERPL CALC-MCNC: 79 MG/DL
NON HDL CHOLESTEROL: 97 MG/DL (ref 0–149)
POTASSIUM SERPL-SCNC: 4.6 MMOL/L (ref 3.5–5.3)
PROT SERPL-MCNC: 7 G/DL (ref 6.4–8.2)
SODIUM SERPL-SCNC: 140 MMOL/L (ref 136–145)
TRIGL SERPL-MCNC: 93 MG/DL (ref 0–149)
VLDL: 19 MG/DL (ref 0–40)

## 2023-10-16 PROCEDURE — 80061 LIPID PANEL: CPT

## 2023-10-16 PROCEDURE — 36415 COLL VENOUS BLD VENIPUNCTURE: CPT

## 2023-10-16 PROCEDURE — 83036 HEMOGLOBIN GLYCOSYLATED A1C: CPT

## 2023-10-16 PROCEDURE — 80053 COMPREHEN METABOLIC PANEL: CPT

## 2023-10-23 ENCOUNTER — OFFICE VISIT (OUTPATIENT)
Dept: PRIMARY CARE | Facility: CLINIC | Age: 73
End: 2023-10-23
Payer: MEDICARE

## 2023-10-23 VITALS
SYSTOLIC BLOOD PRESSURE: 88 MMHG | WEIGHT: 124 LBS | DIASTOLIC BLOOD PRESSURE: 62 MMHG | HEART RATE: 75 BPM | OXYGEN SATURATION: 97 % | TEMPERATURE: 97.5 F | BODY MASS INDEX: 20.96 KG/M2

## 2023-10-23 DIAGNOSIS — Z79.4 TYPE 2 DIABETES MELLITUS WITHOUT COMPLICATION, WITH LONG-TERM CURRENT USE OF INSULIN (MULTI): Primary | ICD-10-CM

## 2023-10-23 DIAGNOSIS — E11.9 TYPE 2 DIABETES MELLITUS WITHOUT COMPLICATION, WITH LONG-TERM CURRENT USE OF INSULIN (MULTI): Primary | ICD-10-CM

## 2023-10-23 DIAGNOSIS — M25.551 HIP PAIN, RIGHT: ICD-10-CM

## 2023-10-23 PROCEDURE — 4010F ACE/ARB THERAPY RXD/TAKEN: CPT | Performed by: FAMILY MEDICINE

## 2023-10-23 PROCEDURE — 1160F RVW MEDS BY RX/DR IN RCRD: CPT | Performed by: FAMILY MEDICINE

## 2023-10-23 PROCEDURE — 3052F HG A1C>EQUAL 8.0%<EQUAL 9.0%: CPT | Performed by: FAMILY MEDICINE

## 2023-10-23 PROCEDURE — 1159F MED LIST DOCD IN RCRD: CPT | Performed by: FAMILY MEDICINE

## 2023-10-23 PROCEDURE — 3048F LDL-C <100 MG/DL: CPT | Performed by: FAMILY MEDICINE

## 2023-10-23 PROCEDURE — 3078F DIAST BP <80 MM HG: CPT | Performed by: FAMILY MEDICINE

## 2023-10-23 PROCEDURE — 99214 OFFICE O/P EST MOD 30 MIN: CPT | Performed by: FAMILY MEDICINE

## 2023-10-23 PROCEDURE — 3074F SYST BP LT 130 MM HG: CPT | Performed by: FAMILY MEDICINE

## 2023-10-23 RX ORDER — INSULIN LISPRO 100 [IU]/ML
2 INJECTION, SOLUTION INTRAVENOUS; SUBCUTANEOUS
Qty: 5.4 ML | Refills: 3 | Status: SHIPPED | OUTPATIENT
Start: 2023-10-23 | End: 2024-01-22 | Stop reason: ALTCHOICE

## 2023-10-23 RX ORDER — BLOOD-GLUCOSE SENSOR
EACH MISCELLANEOUS
Qty: 6 EACH | Refills: 3 | Status: SHIPPED | OUTPATIENT
Start: 2023-10-23

## 2023-10-23 ASSESSMENT — ENCOUNTER SYMPTOMS: HYPERTENSION: 1

## 2023-10-23 NOTE — PROGRESS NOTES
Subjective   Patient ID: Nina Oquendo is a 73 y.o. female who presents for Hypertension (Recheck), Diabetes (Review BW), and Hyperlipidemia.  Hypertension    Diabetes    Hyperlipidemia        1. DM: a1c was 8.2.      2. Lipids: denies chest pain. taking medication.     3. Tobacco use: still smoking    4. HTN: well-controlled with medication .does not measure at home.     5. Adrenal carcinoma: now cancer free    Patient Active Problem List   Diagnosis    Type 2 diabetes mellitus (CMS/[facility])    Hyperlipidemia    Chronic obstructive lung disease (CMS/[facility])    Benign essential hypertension    Essential hypertension    Depressive disorder    Protein-calorie malnutrition, unspecified severity (CMS/[facility])       Social Connections: Not on file       Current Outpatient Medications on File Prior to Visit   Medication Sig Dispense Refill    atorvastatin (Lipitor) 40 mg tablet Take 1 tablet (40 mg) by mouth once daily. 90 tablet 3    blood sugar diagnostic (Prodigy No Coding) strip Prodigy Pressure Use to check 2x a day 200 strip 0    cyclobenzaprine (Flexeril) 5 mg tablet Take 1 tablet (5 mg) by mouth every 8 hours if needed.      DULoxetine (Cymbalta) 20 mg DR capsule Take 1 capsule (20 mg) by mouth once daily. 90 capsule 3    FreeStyle lancets 28 gauge 1 each 2 times a day. Prodigy Pressure activated safety lancets 28G To check 2x a day, 3 mo supply 200 each 0    insulin glargine (Lantus Solostar U-100 Insulin) 100 unit/mL (3 mL) pen Inject 20 Units under the skin once daily at bedtime. Take as directed per insulin instructions. 18 mL 3    losartan (Cozaar) 50 mg tablet Take 1 tablet (50 mg) by mouth once daily. 90 tablet 3    metoprolol succinate XL (Toprol-XL) 50 mg 24 hr tablet Take 1 tablet (50 mg) by mouth once daily. 90 tablet 3    omeprazole OTC (PriLOSEC OTC) 20 mg EC tablet Take 1 tablet (20 mg) by mouth once daily in the morning. Take before meals. 90 tablet 3    ondansetron (Zofran) 4 mg tablet Take 1 tablet (4 mg)  "by mouth every 8 hours if needed for nausea.      pen needle, diabetic 31 gauge x 3/16\" needle 1 each once daily. 100 each 3     No current facility-administered medications on file prior to visit.        Vitals:    10/23/23 1118   BP: 88/62   Pulse: 75   Temp: 36.4 °C (97.5 °F)   SpO2: 97%     Vitals:    10/23/23 1118   Weight: 56.2 kg (124 lb)       Review of Systems   All other systems reviewed and are negative.      Objective     Physical Exam  Constitutional:       Appearance: Normal appearance. She is well-developed.   HENT:      Head: Atraumatic.   Cardiovascular:      Rate and Rhythm: Normal rate and regular rhythm.      Heart sounds: Normal heart sounds. No murmur heard.  Pulmonary:      Effort: Pulmonary effort is normal.      Breath sounds: Normal breath sounds.   Abdominal:      General: Bowel sounds are normal.      Palpations: Abdomen is soft.   Skin:     General: Skin is warm.   Neurological:      General: No focal deficit present.      Mental Status: She is alert.   Psychiatric:         Mood and Affect: Mood normal.         Lab on 10/16/2023   Component Date Value Ref Range Status    Glucose 10/16/2023 182 (H)  74 - 99 mg/dL Final    Sodium 10/16/2023 140  136 - 145 mmol/L Final    Potassium 10/16/2023 4.6  3.5 - 5.3 mmol/L Final    Chloride 10/16/2023 106  98 - 107 mmol/L Final    Bicarbonate 10/16/2023 29  21 - 32 mmol/L Final    Anion Gap 10/16/2023 10  10 - 20 mmol/L Final    Urea Nitrogen 10/16/2023 17  6 - 23 mg/dL Final    Creatinine 10/16/2023 0.67  0.50 - 1.05 mg/dL Final    eGFR 10/16/2023 >90  >60 mL/min/1.73m*2 Final    Calculations of estimated GFR are performed using the 2021 CKD-EPI Study Refit equation without the race variable for the IDMS-Traceable creatinine methods.  https://jasn.asnjournals.org/content/early/2021/09/22/ASN.8099496423    Calcium 10/16/2023 9.4  8.6 - 10.3 mg/dL Final    Albumin 10/16/2023 4.3  3.4 - 5.0 g/dL Final    Alkaline Phosphatase 10/16/2023 109  33 - 136 " U/L Final    Total Protein 10/16/2023 7.0  6.4 - 8.2 g/dL Final    AST 10/16/2023 17  9 - 39 U/L Final    Bilirubin, Total 10/16/2023 0.7  0.0 - 1.2 mg/dL Final    ALT 10/16/2023 13  7 - 45 U/L Final    Patients treated with Sulfasalazine may generate falsely decreased results for ALT.    Hemoglobin A1C 10/16/2023 8.2 (H)  see below % Final    Estimated Average Glucose 10/16/2023 189  Not Established mg/dL Final    Cholesterol 10/16/2023 159  0 - 199 mg/dL Final          Age      Desirable   Borderline High   High     0-19 Y     0 - 169       170 - 199     >/= 200    20-24 Y     0 - 189       190 - 224     >/= 225         >24 Y     0 - 199       200 - 239     >/= 240   **All ranges are based on fasting samples. Specific   therapeutic targets will vary based on patient-specific   cardiac risk.    Pediatric guidelines reference:Pediatrics 2011, 128(S5).Adult guidelines reference: NCEP ATPIII Guidelines,MORIAH 2001, 258:2486-97    Venipuncture immediately after or during the administration of Metamizole may lead to falsely low results. Testing should be performed immediately prior to Metamizole dosing.    HDL-Cholesterol 10/16/2023 61.8  mg/dL Final      Age       Very Low   Low     Normal    High    0-19 Y    < 35      < 40     40-45     ----  20-24 Y    ----     < 40      >45      ----        >24 Y      ----     < 40     40-60      >60      Cholesterol/HDL Ratio 10/16/2023 2.6   Final      Ref Values  Desirable  < 3.4  High Risk  > 5.0    LDL Calculated 10/16/2023 79  <=99 mg/dL Final                                Near   Borderline      AGE      Desirable  Optimal    High     High     Very High     0-19 Y     0 - 109     ---    110-129   >/= 130     ----    20-24 Y     0 - 119     ---    120-159   >/= 160     ----      >24 Y     0 -  99   100-129  130-159   160-189     >/=190      VLDL 10/16/2023 19  0 - 40 mg/dL Final    Triglycerides 10/16/2023 93  0 - 149 mg/dL Final       Age         Desirable   Borderline High    High     Very High   0 D-90 D    19 - 174         ----         ----        ----  91 D- 9 Y     0 -  74        75 -  99     >/= 100      ----    10-19 Y     0 -  89        90 - 129     >/= 130      ----    20-24 Y     0 - 114       115 - 149     >/= 150      ----         >24 Y     0 - 149       150 - 199    200- 499    >/= 500    Venipuncture immediately after or during the administration of Metamizole may lead to falsely low results. Testing should be performed immediately prior to Metamizole dosing.    Non HDL Cholesterol 10/16/2023 97  0 - 149 mg/dL Final          Age       Desirable   Borderline High   High     Very High     0-19 Y     0 - 119       120 - 144     >/= 145    >/= 160    20-24 Y     0 - 149       150 - 189     >/= 190      ----         >24 Y    30 mg/dL above LDL Cholesterol goal     Orders Only on 09/11/2023   Component Date Value Ref Range Status    Aldosterone 09/11/2023 6.4  ng/dL Final    Comment: INTERPRETIVE INFORMATION: Aldosterone, Serum  Reference intervals for age 15 and older:   Upright .........  4.0 - 31.0 ng/dL   Supine ..........  Less than or equal to 16.0 ng/dL   Unspecified .....  Less than or equal to 31.0 ng/dL  Normal serum levels of aldosterone are dependent on the sodium   intake and whether the patient is upright or supine. High sodium   intake will tend to suppress serum aldosterone, whereas low sodium   intake will elevate serum aldosterone. The reference intervals for   serum aldosterone are based on normal sodium intake.      Access complete set of age- and/or gender-specific reference   intervals for this test in the [facility] Test Directory   ([email or web address]).  Performed By: [facility]  [address] [place] [id number]  : Mateo Oliveros MD, PhD  CLIA Number: [id number]      Renin Activity 09/11/2023 0.4  ng/mL/hr Final    Comment: INTERPRETIVE INFORMATION: Renin Activity  Adult, Normal sodium diet:    Supine .................  0.2-1.6 ng/mL/hr    Upright ................ 0.5-4.0 ng/mL/hr  Children, Normal sodium diet, Supine:    [place] (1-7 days) ..... 2.0-35.0 ng/mL/hr    Cord blood ............. 4.0-32.0 ng/mL/hr    1-12 mos ............... 2.4-37.0 ng/mL/hr    13 mos-3 yrs ........... 1.7-11.2 ng/mL/hr    4-5 yrs ................ 1.0- 6.5 ng/mL/hr    6-10 yrs ............... 0.5- 5.9 ng/mL/hr    11-15 yrs .............. 0.5- 3.3 ng/mL/hr  Children, normal sodium diet, Upright:    0-3 yrs ................ Not Available    4-5 yrs ................ Less than or equal to 15 ng/mL/hr    6-10 yrs ............... Less than or equal to 17 ng/mL/hr    11-15 yrs .............. Less than or equal to 16 ng/mL/hr  Plasma renin activity measures enzyme ability to convert   angiotensinogen to angiotensin I and is limited by the   availability of angiotensinogen. Plasma renin activity is not an   accurate indicator of enzyme                            activity when angiotensinogen is   decreased.  This test was developed and its performance characteristics   determined by [facility]. It has not been cleared or   approved by the US Food and Drug Administration. This test was   performed in a CLIA certified laboratory and is intended for   clinical purposes.  Performed By: [facility]  [address] [place] [id number]  : Mateo Oliveros MD, PhD  CLIA Number: [id number]      [facility] 2023 18  13 - 130 ug/dL Final    Comment: MATURITY-BASED REFERENCE RANGES:        PUBERTAL (FLORI) STAGE    MALE      FEMALE       ---------------------------------------------------                 I           5 - 265     5 - 125                  II          15 - 380    15 - 150                 III          60 - 505    20 - 535                            IV          65 - 560    35 - 485                      V         165 - 500    75 - 530       Biotin interference may cause falsely elevated results.   Patients taking  a Biotin dose of up to 5 mg/day should   refrain from taking Biotin for 24 hours before sample   collection. Providers may contact their local laboratory  for further information.      Metanephrine 09/11/2023 <0.20  <0.50 nmol/L Final    Comment: -------------------ADDITIONAL INFORMATION-------------------  This test was developed and its performance characteristics   determined by [facility] in a manner consistent with CLIA   requirements. This test has not been cleared or approved by   the U.S. Food and Drug Administration.     Test Performed by:  [facility]  [address] [place] [address]  : Camilo Decker M.D. Ph.D.; CLIA# [id number]      Normetanephrine 09/11/2023 0.65  <0.90 nmol/L Final    Adrenocorticotropic Hormone (ACTH) 09/11/2023 9.1  7.2 - 63.3 pg/mL Final    Comment: INTERPRETIVE INFORMATION: Adrenocorticotropic Hormone  Reference interval based on samples collected between 7 a.m. and   10 a.m.  No reference intervals established for p.m. collections.    Pediatric reference values are the same as adults (Acta Paediatr   Scand 1981;70:341-345).  This assay measures intact ACTH 1-39;   some types of synthetic ACTH and ACTH fragments are not detected   by this assay.  Performed By: [facility]  [address] [place] [id number]  : Mateo Oliveros MD, PhD  CLIA Number: [id number]      Glucose 09/11/2023 116 (H)  74 - 99 mg/dL Final    Sodium 09/11/2023 142  136 - 145 mmol/L Final    Potassium 09/11/2023 4.7  3.5 - 5.3 mmol/L Final    Chloride 09/11/2023 108 (H)  98 - 107 mmol/L Final    Bicarbonate 09/11/2023 28  21 - 32 mmol/L Final    Anion Gap 09/11/2023 11  10 - 20 mmol/L Final    Urea Nitrogen 09/11/2023 19  6 - 23 mg/dL Final    Creatinine 09/11/2023 0.71  0.50 - 1.05 mg/dL Final    GFR Female 09/11/2023 >90  >90 mL/min/1.73m2 Final    Comment:  CALCULATIONS OF ESTIMATED GFR ARE PERFORMED    USING THE 2021 CKD-EPI STUDY REFIT EQUATION   WITHOUT THE RACE VARIABLE FOR THE IDMS-TRACEABLE   CREATININE METHODS.    https://jasn.asnjournals.org/content/early/2021/09/22/ASN.9219637120      Calcium 09/11/2023 9.2  8.6 - 10.3 mg/dL Final    Cortisol- AM 09/11/2023 22.8 (H)  5.0 - 20.0 ug/dL Final       Assessment/Plan   Problem List Items Addressed This Visit             ICD-10-CM    Type 2 diabetes mellitus (CMS/[facility]) - Primary E11.9    Relevant Medications    insulin lispro (HumaLOG U-100 Insulin) 100 unit/mL injection    blood-glucose sensor (FreeStyle Mukund 3 Sensor) device    Other Relevant Orders    Hemoglobin A1C    Comprehensive Metabolic Panel     Other Visit Diagnoses         Codes    Hip pain, right     M25.551    Relevant Orders    Referral to Orthopaedic Surgery          Patient is doing well.  Refilled pts meds.  Follow up in 3 mo.  Starting Mukund CGM.   Adding on mealtime insulin.  Ref to Dr. Sanchez for hip.

## 2023-11-07 ENCOUNTER — OFFICE VISIT (OUTPATIENT)
Dept: ORTHOPEDIC SURGERY | Facility: CLINIC | Age: 73
End: 2023-11-07
Payer: MEDICARE

## 2023-11-07 ENCOUNTER — ANCILLARY PROCEDURE (OUTPATIENT)
Dept: RADIOLOGY | Facility: CLINIC | Age: 73
End: 2023-11-07
Payer: MEDICARE

## 2023-11-07 VITALS — HEIGHT: 65 IN | BODY MASS INDEX: 20.66 KG/M2 | WEIGHT: 124 LBS

## 2023-11-07 VITALS — HEIGHT: 65 IN | WEIGHT: 124 LBS | BODY MASS INDEX: 20.66 KG/M2

## 2023-11-07 DIAGNOSIS — M25.551 RIGHT HIP PAIN: ICD-10-CM

## 2023-11-07 DIAGNOSIS — M16.11 PRIMARY LOCALIZED OSTEOARTHRITIS OF RIGHT HIP: Primary | ICD-10-CM

## 2023-11-07 DIAGNOSIS — M25.551 HIP PAIN, RIGHT: ICD-10-CM

## 2023-11-07 DIAGNOSIS — M16.11 PRIMARY OSTEOARTHRITIS OF RIGHT HIP: Primary | ICD-10-CM

## 2023-11-07 PROCEDURE — 73502 X-RAY EXAM HIP UNI 2-3 VIEWS: CPT | Mod: RT,FY

## 2023-11-07 PROCEDURE — 4010F ACE/ARB THERAPY RXD/TAKEN: CPT | Performed by: ORTHOPAEDIC SURGERY

## 2023-11-07 PROCEDURE — 4010F ACE/ARB THERAPY RXD/TAKEN: CPT | Performed by: EMERGENCY MEDICINE

## 2023-11-07 PROCEDURE — 3074F SYST BP LT 130 MM HG: CPT | Performed by: EMERGENCY MEDICINE

## 2023-11-07 PROCEDURE — 3078F DIAST BP <80 MM HG: CPT | Performed by: ORTHOPAEDIC SURGERY

## 2023-11-07 PROCEDURE — 3048F LDL-C <100 MG/DL: CPT | Performed by: ORTHOPAEDIC SURGERY

## 2023-11-07 PROCEDURE — 99204 OFFICE O/P NEW MOD 45 MIN: CPT | Performed by: ORTHOPAEDIC SURGERY

## 2023-11-07 PROCEDURE — 3052F HG A1C>EQUAL 8.0%<EQUAL 9.0%: CPT | Performed by: EMERGENCY MEDICINE

## 2023-11-07 PROCEDURE — 1159F MED LIST DOCD IN RCRD: CPT | Performed by: EMERGENCY MEDICINE

## 2023-11-07 PROCEDURE — 3052F HG A1C>EQUAL 8.0%<EQUAL 9.0%: CPT | Performed by: ORTHOPAEDIC SURGERY

## 2023-11-07 PROCEDURE — 1159F MED LIST DOCD IN RCRD: CPT | Performed by: ORTHOPAEDIC SURGERY

## 2023-11-07 PROCEDURE — 1160F RVW MEDS BY RX/DR IN RCRD: CPT | Performed by: EMERGENCY MEDICINE

## 2023-11-07 PROCEDURE — 3048F LDL-C <100 MG/DL: CPT | Performed by: EMERGENCY MEDICINE

## 2023-11-07 PROCEDURE — 1125F AMNT PAIN NOTED PAIN PRSNT: CPT | Performed by: ORTHOPAEDIC SURGERY

## 2023-11-07 PROCEDURE — 1125F AMNT PAIN NOTED PAIN PRSNT: CPT | Performed by: EMERGENCY MEDICINE

## 2023-11-07 PROCEDURE — 99204 OFFICE O/P NEW MOD 45 MIN: CPT | Performed by: EMERGENCY MEDICINE

## 2023-11-07 PROCEDURE — 3074F SYST BP LT 130 MM HG: CPT | Performed by: ORTHOPAEDIC SURGERY

## 2023-11-07 PROCEDURE — 20611 DRAIN/INJ JOINT/BURSA W/US: CPT | Performed by: EMERGENCY MEDICINE

## 2023-11-07 PROCEDURE — 73502 X-RAY EXAM HIP UNI 2-3 VIEWS: CPT | Mod: RIGHT SIDE | Performed by: RADIOLOGY

## 2023-11-07 PROCEDURE — 3078F DIAST BP <80 MM HG: CPT | Performed by: EMERGENCY MEDICINE

## 2023-11-07 PROCEDURE — 1160F RVW MEDS BY RX/DR IN RCRD: CPT | Performed by: ORTHOPAEDIC SURGERY

## 2023-11-07 RX ORDER — METHYLPREDNISOLONE ACETATE 40 MG/ML
80 INJECTION, SUSPENSION INTRA-ARTICULAR; INTRALESIONAL; INTRAMUSCULAR; SOFT TISSUE
Status: COMPLETED | OUTPATIENT
Start: 2023-11-07 | End: 2023-11-07

## 2023-11-07 RX ORDER — LIDOCAINE HYDROCHLORIDE 10 MG/ML
4 INJECTION INFILTRATION; PERINEURAL
Status: COMPLETED | OUTPATIENT
Start: 2023-11-07 | End: 2023-11-07

## 2023-11-07 RX ADMIN — LIDOCAINE HYDROCHLORIDE 4 ML: 10 INJECTION INFILTRATION; PERINEURAL at 09:41

## 2023-11-07 RX ADMIN — METHYLPREDNISOLONE ACETATE 80 MG: 40 INJECTION, SUSPENSION INTRA-ARTICULAR; INTRALESIONAL; INTRAMUSCULAR; SOFT TISSUE at 09:41

## 2023-11-07 ASSESSMENT — PAIN SCALES - GENERAL: PAINLEVEL_OUTOF10: 5 - MODERATE PAIN

## 2023-11-07 ASSESSMENT — PAIN - FUNCTIONAL ASSESSMENT: PAIN_FUNCTIONAL_ASSESSMENT: 0-10

## 2023-11-07 NOTE — PROGRESS NOTES
Subjective    Patient ID: Nina Oquendo is a 73 y.o. female.    Chief Complaint: Pain of the Right Hip     Last Surgery: No surgery found  Last Surgery Date: No surgery found    Nina is a 73-year-old female referred to me by Dr. Sanchez for acute on chronic right hip pain.  She has had pain that has been very significant for the past 2 years and would like to avoid a hip replacement if at all possible.  She takes Tylenol with minimal relief.  She has fallen in the past but denies any specific known traumatic injuries.  Her pain is all in her groin and is moderate in severity.  It radiates slightly laterally but is mostly anterior.  She has no numbness or weakness.  No lower back pain.        Objective   Right Hip Exam     Tenderness   The patient is experiencing no tenderness.     Range of Motion   Abduction:  abnormal   Adduction:  abnormal   Extension:  abnormal   Flexion:  abnormal   External rotation:  abnormal   Internal rotation:  abnormal     Muscle Strength   Abduction: 4/5   Adduction: 4/5   Flexion: 4/5     Tests   ELA: positive    Other   Erythema: absent  Sensation: normal  Pulse: present      Left Hip Exam   Left hip exam is normal.          Image Results:  X-rays of the right hip were reviewed and interpreted by me on 11/7/2023 and revealed severe femoral acetabular DJD without any evidence of fractures.    Patient ID: Nina Oquendo is a 73 y.o. female.    L Inj/Asp: R hip joint on 11/7/2023 9:41 AM  Indications: pain  Details: 20 G needle, ultrasound-guided anterior approach  Medications: 80 mg methylPREDNISolone acetate 40 mg/mL; 4 mL lidocaine 10 mg/mL (1 %)  Outcome: tolerated well, no immediate complications  Procedure, treatment alternatives, risks and benefits explained, specific risks discussed. Consent was given by the patient. Patient was prepped and draped in the usual sterile fashion.           Assessment/Plan   Encounter Diagnoses:  Right hip pain    We discussed her treatment options  and agreed to perform a cortisone injection of her right femoral acetabular joint under ultrasound guidance which she tolerated very well without any complications.  Activity modifications were reviewed.  She will also begin using naproxen twice daily for her pain and can follow-up with me on an as-needed basis if she responds well to this injection.  Eventually she would likely require hip replacement but she would like to postpone that as long as possible.    ** Please excuse any errors in grammar or translation related to this dictation. Voice recognition software was utilized to prepare this document. **     Kyle Garber MD   Sports Medicine    Orders Placed This Encounter    Point of Care Ultrasound     No follow-ups on file.

## 2023-11-07 NOTE — PROGRESS NOTES
PRIMARY CARE PHYSICIAN: Christopher Sam MD  REFERRING PROVIDER: Christopher Sam MD  [address]   [place] [id number]     CONSULT ORTHOPAEDIC: Hip Evaluation        ASSESSMENT & PLAN    IMPRESSION:  1.  Primary osteoarthritis, right hip  2.  Uncontrolled diabetes, hemoglobin A1c 8.1    PLAN:  Discussed with patient findings above.  Currently she is severe arthritic changes noted on x-rays which was reviewed with her.  Ultimately she does require hip replacement but currently due to her A1c being over 7.5 is not a surgical candidate.  We recommended she try corticosteroid injection for pain control that can be performed by one of our sports partners.  She is agreeable to this.  While her diabetes getting better under control may continue with anti-inflammatories and Tylenol for pain control and will follow-up once her hemoglobin A1c is under 7.5 for surgery scheduling      SUBJECTIVE  CHIEF COMPLAINT:   Chief Complaint   Patient presents with    Right Hip - Pain        HPI: Nina Oquendo is a 73 y.o. patient. Nina Oquendo has had progressive problems with the right hip over the past 2 years. They do report any trauma. She did fall at a family pool and her right leg slid behind her.  They do not report any constant or progressive numbness or tingling in their legs. Their symptoms are interfering with activities which include pain in her groin area. She states she has a lot of weakness and has fallen a few times at home and had a hard time getting back up.  She has pain with standing or sitting for long.     FUNCTIONAL STATUS: occasionally limited.  AMBULATORY STATUS: Independent community ambulation with canes/crutches  PREVIOUS TREATMENTS:  Just Tylenol as needed   HISTORY OF SURGERY ON AFFECTED HIP(S): No   BACK PAIN REPORTED: Yes       REVIEW OF SYSTEMS  Constitutional: See HPI for pain assessment, No significant weight loss, recent trauma  Cardiovascular: No chest pain, shortness of  breath  Respiratory: No difficulty breathing, cough  Gastrointestinal: No nausea, vomiting, diarrhea, constipation  Musculoskeletal: Noted in HPI, positive for pain, restricted motion, stiffness  Integumentary: No rashes, easy bruising, redness   Neurological: no numbness or tingling in extremities, no gait disturbances   Psychiatric: No mood changes, memory changes, social issues  Heme/Lymph: no excessive swelling, easy bruising, excessive bleeding  ENT: No hearing changes  Eyes: No vision changes    Past Medical History:   Diagnosis Date    Type 2 diabetes mellitus without complications (CMS/[facility]) 12/04/2013    Diabetes mellitus        Allergies   Allergen Reactions    Metformin Unknown        Past Surgical History:   Procedure Laterality Date    OTHER SURGICAL HISTORY  08/31/2020    Hysterectomy total    OTHER SURGICAL HISTORY  08/31/2020    Cleft palate repair        Family History   Problem Relation Name Age of Onset    Diabetes type II Brother      Diabetes type II Brother          Social History     Socioeconomic History    Marital status: Single     Spouse name: Not on file    Number of children: Not on file    Years of education: Not on file    Highest education level: Not on file   Occupational History    Not on file   Tobacco Use    Smoking status: Every Day     Packs/day: 1     Types: Cigarettes    Smokeless tobacco: Never   Substance and Sexual Activity    Alcohol use: Not on file    Drug use: Not on file    Sexual activity: Not on file   Other Topics Concern    Not on file   Social History Narrative    Not on file     Social Determinants of Health     Financial Resource Strain: Not on file   Food Insecurity: Not on file   Transportation Needs: Not on file   Physical Activity: Not on file   Stress: Not on file   Social Connections: Not on file   Intimate Partner Violence: Not on file   Housing Stability: Not on file        CURRENT MEDICATIONS:   Current Outpatient Medications   Medication Sig Dispense  "Refill    atorvastatin (Lipitor) 40 mg tablet Take 1 tablet (40 mg) by mouth once daily. 90 tablet 3    blood sugar diagnostic (Prodigy No Coding) strip Prodigy Pressure Use to check 2x a day 200 strip 0    blood-glucose sensor (FreeStyle Mukund 3 Sensor) device Apply under skin [id number] 6 each 3    cyclobenzaprine (Flexeril) 5 mg tablet Take 1 tablet (5 mg) by mouth every 8 hours if needed.      DULoxetine (Cymbalta) 20 mg DR capsule Take 1 capsule (20 mg) by mouth once daily. 90 capsule 3    FreeStyle lancets 28 gauge 1 each 2 times a day. Prodigy Pressure activated safety lancets 28G To check 2x a day, 3 mo supply 200 each 0    insulin glargine (Lantus Solostar U-100 Insulin) 100 unit/mL (3 mL) pen Inject 20 Units under the skin once daily at bedtime. Take as directed per insulin instructions. 18 mL 3    insulin lispro (HumaLOG U-100 Insulin) 100 unit/mL injection Inject 0.02 mL (2 Units) under the skin 3 times a day with meals. Take as directed per insulin instructions 5.4 mL 3    losartan (Cozaar) 50 mg tablet Take 1 tablet (50 mg) by mouth once daily. 90 tablet 3    metoprolol succinate XL (Toprol-XL) 50 mg 24 hr tablet Take 1 tablet (50 mg) by mouth once daily. 90 tablet 3    omeprazole OTC (PriLOSEC OTC) 20 mg EC tablet Take 1 tablet (20 mg) by mouth once daily in the morning. Take before meals. 90 tablet 3    ondansetron (Zofran) 4 mg tablet Take 1 tablet (4 mg) by mouth every 8 hours if needed for nausea.      pen needle, diabetic 31 gauge x 3/16\" needle 1 each once daily. 100 each 3     No current facility-administered medications for this visit.        OBJECTIVE    PHYSICAL EXAM      12/13/2022     8:55 AM 12/21/2022     2:02 PM 1/17/2023     3:13 PM 3/10/2023     1:55 PM 3/21/2023    11:07 AM 6/21/2023    11:09 AM 10/23/2023    11:18 AM   Vitals   Systolic 139 127 106 123 130 110 88   Diastolic 72 77 62 62 78 72 62   Heart Rate 82 83 88  74 80 75   Temp 36.6 °C (97.8 °F)   35.5 °C (95.9 °F) 36.1 °C (97 " "°F) 36.2 °C (97.2 °F) 36.4 °C (97.5 °F)   Height (in) 1.638 m (5' 4.5\") 1.638 m (5' 4.5\") 1.638 m (5' 4.5\")   1.638 m (5' 4.5\")    Weight (lb)  127 127 120 118 118 124   BMI 24.22 kg/m2 21.46 kg/m2 21.46 kg/m2 20.28 kg/m2 19.94 kg/m2 19.94 kg/m2 20.96 kg/m2   BSA (m2) 1.72 m2 1.62 m2 1.62 m2 1.57 m2 1.56 m2 1.56 m2 1.6 m2   Visit Report     Report Report Report      Body mass index is 20.96 kg/m².    GENERAL: A/Ox3, NAD. Appears healthy, well nourished  PSYCHIATRIC: Mood stable, appropriate memory recall  EYES: EOM intact, no scleral icterus  CARDIAC: regular rate  LUNGS: Breathing non-labored  SKIN: no erythema, rashes, or ecchymoses     MUSCULOSKELETAL:  Laterality: right Hip Exam  - ROM, Extension: full, no flexion contracture  - Strength: Abduction 5/5 against resitance, Flexion 5/5  - Palpation: mild TTP along greater trochanter  - Log roll/IR exam: painful and limited motion. Pain with hip flexion past 90 degrees and internal rotation  - Straight leg raise: negative  - EHL/PF/DF motor intact  - Gait: antalgic to arthritic side, negative Trendelenburg gait   - Special Tests: none performed    NEUROVASCULAR:  - Neurovascular Status: sensation intact to light touch distally  - Capillary refill brisk at extremities, Bilateral dorsalis pedis pulse 2+       IMAGING:  Multiple views of the affected right hip(s) demonstrate: Severe arthritic changes noted complete joint space narrowing, subchondral sclerosis, large osteophytic change, subchondral cystic change.   X-rays were personally reviewed and interpreted by me.  Radiology reports were reviewed by me as well, if readily available at the time.        Harshal Sanchez DO  Attending Surgeon  Joint Replacement and Adult Reconstructive Surgery  [place], [place]                         "

## 2023-11-07 NOTE — LETTER
November 7, 2023     Christopher Sam MD  [address] [place] [id number]    Patient: Nina Oquendo   YOB: 1950   Date of Visit: 11/7/2023       Dear Dr. Christopher Sam MD:    Thank you for referring Nina Oquendo to me for evaluation. Below are my notes for this consultation.  If you have questions, please do not hesitate to call me. I look forward to following your patient along with you.       Sincerely,     Harshal Sanchez, DO      CC: No Recipients  ______________________________________________________________________________________    PRIMARY CARE PHYSICIAN: Christopher Sam MD  REFERRING PROVIDER: Christopher Sam MD  [address]  [place] [id number]     CONSULT ORTHOPAEDIC: Hip Evaluation        ASSESSMENT & PLAN    IMPRESSION:  1.  Primary osteoarthritis, right hip  2.  Uncontrolled diabetes, hemoglobin A1c 8.1    PLAN:  Discussed with patient findings above.  Currently she is severe arthritic changes noted on x-rays which was reviewed with her.  Ultimately she does require hip replacement but currently due to her A1c being over 7.5 is not a surgical candidate.  We recommended she try corticosteroid injection for pain control that can be performed by one of our sports partners.  She is agreeable to this.  While her diabetes getting better under control may continue with anti-inflammatories and Tylenol for pain control and will follow-up once her hemoglobin A1c is under 7.5 for surgery scheduling      SUBJECTIVE  CHIEF COMPLAINT:   Chief Complaint   Patient presents with   • Right Hip - Pain        HPI: Nina Oquendo is a 73 y.o. patient. Nina Oquendo has had progressive problems with the right hip over the past 2 years. They do report any trauma. She did fall at a family pool and her right leg slid behind her.  They do not report any constant or progressive numbness or tingling in their legs. Their symptoms are interfering with activities which include  pain in her groin area. She states she has a lot of weakness and has fallen a few times at home and had a hard time getting back up.  She has pain with standing or sitting for long.     FUNCTIONAL STATUS: occasionally limited.  AMBULATORY STATUS: Independent community ambulation with canes/crutches  PREVIOUS TREATMENTS:  Just Tylenol as needed   HISTORY OF SURGERY ON AFFECTED HIP(S): No   BACK PAIN REPORTED: Yes       REVIEW OF SYSTEMS  Constitutional: See HPI for pain assessment, No significant weight loss, recent trauma  Cardiovascular: No chest pain, shortness of breath  Respiratory: No difficulty breathing, cough  Gastrointestinal: No nausea, vomiting, diarrhea, constipation  Musculoskeletal: Noted in HPI, positive for pain, restricted motion, stiffness  Integumentary: No rashes, easy bruising, redness   Neurological: no numbness or tingling in extremities, no gait disturbances   Psychiatric: No mood changes, memory changes, social issues  Heme/Lymph: no excessive swelling, easy bruising, excessive bleeding  ENT: No hearing changes  Eyes: No vision changes    Past Medical History:   Diagnosis Date   • Type 2 diabetes mellitus without complications (CMS/[facility]) 12/04/2013    Diabetes mellitus        Allergies   Allergen Reactions   • Metformin Unknown        Past Surgical History:   Procedure Laterality Date   • OTHER SURGICAL HISTORY  08/31/2020    Hysterectomy total   • OTHER SURGICAL HISTORY  08/31/2020    Cleft palate repair        Family History   Problem Relation Name Age of Onset   • Diabetes type II Brother     • Diabetes type II Brother          Social History     Socioeconomic History   • Marital status: Single     Spouse name: Not on file   • Number of children: Not on file   • Years of education: Not on file   • Highest education level: Not on file   Occupational History   • Not on file   Tobacco Use   • Smoking status: Every Day     Packs/day: 1     Types: Cigarettes   • Smokeless tobacco: Never    Substance and Sexual Activity   • Alcohol use: Not on file   • Drug use: Not on file   • Sexual activity: Not on file   Other Topics Concern   • Not on file   Social History Narrative   • Not on file     Social Determinants of Health     Financial Resource Strain: Not on file   Food Insecurity: Not on file   Transportation Needs: Not on file   Physical Activity: Not on file   Stress: Not on file   Social Connections: Not on file   Intimate Partner Violence: Not on file   Housing Stability: Not on file        CURRENT MEDICATIONS:   Current Outpatient Medications   Medication Sig Dispense Refill   • atorvastatin (Lipitor) 40 mg tablet Take 1 tablet (40 mg) by mouth once daily. 90 tablet 3   • blood sugar diagnostic (Prodigy No Coding) strip Prodigy Pressure Use to check 2x a day 200 strip 0   • blood-glucose sensor (FreeStyle Mukund 3 Sensor) device Apply under skin [id number] 6 each 3   • cyclobenzaprine (Flexeril) 5 mg tablet Take 1 tablet (5 mg) by mouth every 8 hours if needed.     • DULoxetine (Cymbalta) 20 mg DR capsule Take 1 capsule (20 mg) by mouth once daily. 90 capsule 3   • FreeStyle lancets 28 gauge 1 each 2 times a day. Prodigy Pressure activated safety lancets 28G To check 2x a day, 3 mo supply 200 each 0   • insulin glargine (Lantus Solostar U-100 Insulin) 100 unit/mL (3 mL) pen Inject 20 Units under the skin once daily at bedtime. Take as directed per insulin instructions. 18 mL 3   • insulin lispro (HumaLOG U-100 Insulin) 100 unit/mL injection Inject 0.02 mL (2 Units) under the skin 3 times a day with meals. Take as directed per insulin instructions 5.4 mL 3   • losartan (Cozaar) 50 mg tablet Take 1 tablet (50 mg) by mouth once daily. 90 tablet 3   • metoprolol succinate XL (Toprol-XL) 50 mg 24 hr tablet Take 1 tablet (50 mg) by mouth once daily. 90 tablet 3   • omeprazole OTC (PriLOSEC OTC) 20 mg EC tablet Take 1 tablet (20 mg) by mouth once daily in the morning. Take before meals. 90 tablet 3   •  "ondansetron (Zofran) 4 mg tablet Take 1 tablet (4 mg) by mouth every 8 hours if needed for nausea.     • pen needle, diabetic 31 gauge x 3/16\" needle 1 each once daily. 100 each 3     No current facility-administered medications for this visit.        OBJECTIVE    PHYSICAL EXAM      12/13/2022     8:55 AM 12/21/2022     2:02 PM 1/17/2023     3:13 PM 3/10/2023     1:55 PM 3/21/2023    11:07 AM 6/21/2023    11:09 AM 10/23/2023    11:18 AM   Vitals   Systolic 139 127 106 123 130 110 88   Diastolic 72 77 62 62 78 72 62   Heart Rate 82 83 88  74 80 75   Temp 36.6 °C (97.8 °F)   35.5 °C (95.9 °F) 36.1 °C (97 °F) 36.2 °C (97.2 °F) 36.4 °C (97.5 °F)   Height (in) 1.638 m (5' 4.5\") 1.638 m (5' 4.5\") 1.638 m (5' 4.5\")   1.638 m (5' 4.5\")    Weight (lb)  127 127 120 118 118 124   BMI 24.22 kg/m2 21.46 kg/m2 21.46 kg/m2 20.28 kg/m2 19.94 kg/m2 19.94 kg/m2 20.96 kg/m2   BSA (m2) 1.72 m2 1.62 m2 1.62 m2 1.57 m2 1.56 m2 1.56 m2 1.6 m2   Visit Report     Report Report Report      Body mass index is 20.96 kg/m².    GENERAL: A/Ox3, NAD. Appears healthy, well nourished  PSYCHIATRIC: Mood stable, appropriate memory recall  EYES: EOM intact, no scleral icterus  CARDIAC: regular rate  LUNGS: Breathing non-labored  SKIN: no erythema, rashes, or ecchymoses     MUSCULOSKELETAL:  Laterality: right Hip Exam  - ROM, Extension: full, no flexion contracture  - Strength: Abduction 5/5 against resitance, Flexion 5/5  - Palpation: mild TTP along greater trochanter  - Log roll/IR exam: painful and limited motion. Pain with hip flexion past 90 degrees and internal rotation  - Straight leg raise: negative  - EHL/PF/DF motor intact  - Gait: antalgic to arthritic side, negative Trendelenburg gait   - Special Tests: none performed    NEUROVASCULAR:  - Neurovascular Status: sensation intact to light touch distally  - Capillary refill brisk at extremities, Bilateral dorsalis pedis pulse 2+       IMAGING:  Multiple views of the affected right hip(s) " demonstrate: Severe arthritic changes noted complete joint space narrowing, subchondral sclerosis, large osteophytic change, subchondral cystic change.   X-rays were personally reviewed and interpreted by me.  Radiology reports were reviewed by me as well, if readily available at the time.        Harshal Sanchez DO  Attending Surgeon  [facility]  [place], [place]

## 2023-12-11 ENCOUNTER — LAB (OUTPATIENT)
Dept: LAB | Facility: LAB | Age: 73
End: 2023-12-11
Payer: MEDICARE

## 2023-12-11 DIAGNOSIS — C68.9 MALIGNANT NEOPLASM OF URINARY ORGAN, UNSPECIFIED (MULTI): Primary | ICD-10-CM

## 2023-12-11 LAB
ANION GAP SERPL CALC-SCNC: 14 MMOL/L (ref 10–20)
BUN SERPL-MCNC: 16 MG/DL (ref 6–23)
CALCIUM SERPL-MCNC: 8.9 MG/DL (ref 8.6–10.3)
CHLORIDE SERPL-SCNC: 106 MMOL/L (ref 98–107)
CO2 SERPL-SCNC: 28 MMOL/L (ref 21–32)
CREAT SERPL-MCNC: 0.56 MG/DL (ref 0.5–1.05)
GFR SERPL CREATININE-BSD FRML MDRD: >90 ML/MIN/1.73M*2
GLUCOSE SERPL-MCNC: 118 MG/DL (ref 74–99)
POTASSIUM SERPL-SCNC: 4.6 MMOL/L (ref 3.5–5.3)
SODIUM SERPL-SCNC: 143 MMOL/L (ref 136–145)

## 2023-12-11 PROCEDURE — 36415 COLL VENOUS BLD VENIPUNCTURE: CPT

## 2023-12-11 PROCEDURE — 80048 BASIC METABOLIC PNL TOTAL CA: CPT

## 2023-12-18 ENCOUNTER — HOSPITAL ENCOUNTER (OUTPATIENT)
Dept: RADIOLOGY | Facility: HOSPITAL | Age: 73
Discharge: HOME | End: 2023-12-18
Payer: MEDICARE

## 2023-12-18 DIAGNOSIS — C68.9 MALIGNANT NEOPLASM OF URINARY ORGAN, UNSPECIFIED (MULTI): ICD-10-CM

## 2023-12-18 PROCEDURE — 74178 CT ABD&PLV WO CNTR FLWD CNTR: CPT

## 2023-12-18 PROCEDURE — 74178 CT ABD&PLV WO CNTR FLWD CNTR: CPT | Performed by: RADIOLOGY

## 2023-12-18 PROCEDURE — 76377 3D RENDER W/INTRP POSTPROCES: CPT | Performed by: RADIOLOGY

## 2023-12-18 PROCEDURE — 2550000001 HC RX 255 CONTRASTS: Performed by: STUDENT IN AN ORGANIZED HEALTH CARE EDUCATION/TRAINING PROGRAM

## 2023-12-18 RX ADMIN — IOHEXOL 75 ML: 350 INJECTION, SOLUTION INTRAVENOUS at 09:15

## 2024-01-15 ENCOUNTER — LAB (OUTPATIENT)
Dept: LAB | Facility: LAB | Age: 74
End: 2024-01-15
Payer: MEDICARE

## 2024-01-15 DIAGNOSIS — Z79.4 TYPE 2 DIABETES MELLITUS WITHOUT COMPLICATION, WITH LONG-TERM CURRENT USE OF INSULIN (MULTI): ICD-10-CM

## 2024-01-15 DIAGNOSIS — E11.9 TYPE 2 DIABETES MELLITUS WITHOUT COMPLICATION, WITH LONG-TERM CURRENT USE OF INSULIN (MULTI): ICD-10-CM

## 2024-01-15 LAB
ALBUMIN SERPL BCP-MCNC: 4.3 G/DL (ref 3.4–5)
ALP SERPL-CCNC: 93 U/L (ref 33–136)
ALT SERPL W P-5'-P-CCNC: 11 U/L (ref 7–45)
ANION GAP SERPL CALC-SCNC: 12 MMOL/L (ref 10–20)
AST SERPL W P-5'-P-CCNC: 17 U/L (ref 9–39)
BILIRUB SERPL-MCNC: 0.5 MG/DL (ref 0–1.2)
BUN SERPL-MCNC: 16 MG/DL (ref 6–23)
CALCIUM SERPL-MCNC: 9.4 MG/DL (ref 8.6–10.3)
CHLORIDE SERPL-SCNC: 104 MMOL/L (ref 98–107)
CO2 SERPL-SCNC: 28 MMOL/L (ref 21–32)
CREAT SERPL-MCNC: 0.67 MG/DL (ref 0.5–1.05)
EGFRCR SERPLBLD CKD-EPI 2021: >90 ML/MIN/1.73M*2
EST. AVERAGE GLUCOSE BLD GHB EST-MCNC: 194 MG/DL
GLUCOSE SERPL-MCNC: 170 MG/DL (ref 74–99)
HBA1C MFR BLD: 8.4 %
POTASSIUM SERPL-SCNC: 4.5 MMOL/L (ref 3.5–5.3)
PROT SERPL-MCNC: 6.7 G/DL (ref 6.4–8.2)
SODIUM SERPL-SCNC: 139 MMOL/L (ref 136–145)

## 2024-01-15 PROCEDURE — 80053 COMPREHEN METABOLIC PANEL: CPT

## 2024-01-15 PROCEDURE — 83036 HEMOGLOBIN GLYCOSYLATED A1C: CPT

## 2024-01-15 PROCEDURE — 36415 COLL VENOUS BLD VENIPUNCTURE: CPT

## 2024-01-22 ENCOUNTER — OFFICE VISIT (OUTPATIENT)
Dept: PRIMARY CARE | Facility: CLINIC | Age: 74
End: 2024-01-22
Payer: MEDICARE

## 2024-01-22 VITALS
SYSTOLIC BLOOD PRESSURE: 124 MMHG | HEART RATE: 65 BPM | OXYGEN SATURATION: 98 % | BODY MASS INDEX: 21.29 KG/M2 | TEMPERATURE: 96.8 F | WEIGHT: 126 LBS | DIASTOLIC BLOOD PRESSURE: 82 MMHG

## 2024-01-22 DIAGNOSIS — Z79.4 TYPE 2 DIABETES MELLITUS WITHOUT COMPLICATION, WITH LONG-TERM CURRENT USE OF INSULIN (MULTI): ICD-10-CM

## 2024-01-22 DIAGNOSIS — E78.2 MIXED HYPERLIPIDEMIA: ICD-10-CM

## 2024-01-22 DIAGNOSIS — E11.9 TYPE 2 DIABETES MELLITUS WITHOUT COMPLICATION, WITH LONG-TERM CURRENT USE OF INSULIN (MULTI): ICD-10-CM

## 2024-01-22 DIAGNOSIS — F32.A DEPRESSIVE DISORDER: Primary | ICD-10-CM

## 2024-01-22 DIAGNOSIS — I10 BENIGN ESSENTIAL HYPERTENSION: ICD-10-CM

## 2024-01-22 PROBLEM — F41.9 ANXIETY DISORDER, UNSPECIFIED: Status: ACTIVE | Noted: 2023-01-27

## 2024-01-22 PROBLEM — K21.9 GASTROESOPHAGEAL REFLUX DISEASE WITHOUT ESOPHAGITIS: Status: ACTIVE | Noted: 2022-11-22

## 2024-01-22 PROCEDURE — 3079F DIAST BP 80-89 MM HG: CPT | Performed by: FAMILY MEDICINE

## 2024-01-22 PROCEDURE — 1125F AMNT PAIN NOTED PAIN PRSNT: CPT | Performed by: FAMILY MEDICINE

## 2024-01-22 PROCEDURE — 3052F HG A1C>EQUAL 8.0%<EQUAL 9.0%: CPT | Performed by: FAMILY MEDICINE

## 2024-01-22 PROCEDURE — 99214 OFFICE O/P EST MOD 30 MIN: CPT | Performed by: FAMILY MEDICINE

## 2024-01-22 PROCEDURE — 1159F MED LIST DOCD IN RCRD: CPT | Performed by: FAMILY MEDICINE

## 2024-01-22 PROCEDURE — 3074F SYST BP LT 130 MM HG: CPT | Performed by: FAMILY MEDICINE

## 2024-01-22 PROCEDURE — 1160F RVW MEDS BY RX/DR IN RCRD: CPT | Performed by: FAMILY MEDICINE

## 2024-01-22 PROCEDURE — 4010F ACE/ARB THERAPY RXD/TAKEN: CPT | Performed by: FAMILY MEDICINE

## 2024-01-22 RX ORDER — LOSARTAN POTASSIUM 50 MG/1
50 TABLET ORAL DAILY
Qty: 90 TABLET | Refills: 3 | Status: SHIPPED | OUTPATIENT
Start: 2024-01-22 | End: 2025-01-21

## 2024-01-22 RX ORDER — PEN NEEDLE, DIABETIC 30 GX3/16"
1 NEEDLE, DISPOSABLE MISCELLANEOUS 3 TIMES DAILY
Qty: 300 EACH | Refills: 3 | Status: SHIPPED | OUTPATIENT
Start: 2024-01-22

## 2024-01-22 RX ORDER — OMEPRAZOLE 20 MG/1
20 CAPSULE, DELAYED RELEASE ORAL DAILY
COMMUNITY
Start: 2023-12-12

## 2024-01-22 RX ORDER — INSULIN LISPRO 100 [IU]/ML
5 INJECTION, SOLUTION INTRAVENOUS; SUBCUTANEOUS
Qty: 13.5 ML | Refills: 3 | Status: SHIPPED | OUTPATIENT
Start: 2024-01-22 | End: 2025-01-16

## 2024-01-22 RX ORDER — ATORVASTATIN CALCIUM 40 MG/1
40 TABLET, FILM COATED ORAL DAILY
Qty: 90 TABLET | Refills: 3 | Status: SHIPPED | OUTPATIENT
Start: 2024-01-22 | End: 2025-01-21

## 2024-01-22 RX ORDER — DULOXETIN HYDROCHLORIDE 30 MG/1
30 CAPSULE, DELAYED RELEASE ORAL DAILY
Qty: 90 CAPSULE | Refills: 1 | Status: SHIPPED | OUTPATIENT
Start: 2024-01-22 | End: 2024-07-20

## 2024-01-22 RX ORDER — BLOOD SUGAR DIAGNOSTIC
STRIP MISCELLANEOUS
Qty: 200 STRIP | Refills: 0 | Status: CANCELLED | OUTPATIENT
Start: 2024-01-22

## 2024-01-22 ASSESSMENT — ENCOUNTER SYMPTOMS: HYPERTENSION: 1

## 2024-01-22 NOTE — PROGRESS NOTES
"Subjective   Patient ID: Nina Oquendo is a 73 y.o. female who presents for Diabetes, Hypertension, and Hyperlipidemia (Review BW).  Hypertension    Diabetes    Hyperlipidemia      1. DM: a1c was 8.4.  Didn't take the mealtime insulin.    2. Lipids: denies chest pain. taking medication.     3. Tobacco use: still smoking    4. HTN: well-controlled with medication .does not measure at home.     5. Adrenal carcinoma: now cancer free    6. Anxiety: helped by the duloxetine.    Patient Active Problem List   Diagnosis    Type 2 diabetes mellitus (CMS/HCC)    Hyperlipidemia    Chronic obstructive lung disease (CMS/HCC)    Benign essential hypertension    Essential hypertension    Depressive disorder    Protein-calorie malnutrition, unspecified severity (CMS/[facility])    Anxiety disorder, unspecified    Gastroesophageal reflux disease without esophagitis       Social Connections: Not on file       Current Outpatient Medications on File Prior to Visit   Medication Sig Dispense Refill    blood sugar diagnostic (Prodigy No Coding) strip Prodigy Pressure Use to check 2x a day 200 strip 0    blood-glucose sensor (FreeStyle Mukund 3 Sensor) device Apply under skin [id number] 6 each 3    cyclobenzaprine (Flexeril) 5 mg tablet Take 1 tablet (5 mg) by mouth every 8 hours if needed.      FreeStyle lancets 28 gauge 1 each 2 times a day. Prodigy Pressure activated safety lancets 28G To check 2x a day, 3 mo supply 200 each 0    metoprolol succinate XL (Toprol-XL) 50 mg 24 hr tablet Take 1 tablet (50 mg) by mouth once daily. 90 tablet 3    omeprazole (PriLOSEC) 20 mg DR capsule Take 1 capsule (20 mg) by mouth early in the morning..      omeprazole OTC (PriLOSEC OTC) 20 mg EC tablet Take 1 tablet (20 mg) by mouth once daily in the morning. Take before meals. 90 tablet 3    ondansetron (Zofran) 4 mg tablet Take 1 tablet (4 mg) by mouth every 8 hours if needed for nausea.      pen needle, diabetic 31 gauge x 3/16\" needle 1 each once daily. 100 " I explained to her in the office visit that I will fill out the forms every month to extend her disability if she needs it  If she does not feel like the migraines are improving we can extend the disability but I prefer to do it on a month by month basis  She does not need to follow up in the office for me to complete the forms again  She would just need to bring them in or fax them to us  Thanks  Sent B12 injections to her pharmacy  each 3    [DISCONTINUED] atorvastatin (Lipitor) 40 mg tablet Take 1 tablet (40 mg) by mouth once daily. 90 tablet 3    [DISCONTINUED] DULoxetine (Cymbalta) 20 mg DR capsule Take 1 capsule (20 mg) by mouth once daily. 90 capsule 3    [DISCONTINUED] insulin glargine (Lantus Solostar U-100 Insulin) 100 unit/mL (3 mL) pen Inject 20 Units under the skin once daily at bedtime. Take as directed per insulin instructions. 18 mL 3    [DISCONTINUED] insulin lispro (HumaLOG U-100 Insulin) 100 unit/mL injection Inject 0.02 mL (2 Units) under the skin 3 times a day with meals. Take as directed per insulin instructions 5.4 mL 3    [DISCONTINUED] losartan (Cozaar) 50 mg tablet Take 1 tablet (50 mg) by mouth once daily. 90 tablet 3     No current facility-administered medications on file prior to visit.        Vitals:    01/22/24 1041   BP: 124/82   Pulse: 65   Temp: 36 °C (96.8 °F)   SpO2: 98%     Vitals:    01/22/24 1041   Weight: 57.2 kg (126 lb)       Review of Systems   All other systems reviewed and are negative.      Objective     Physical Exam  Constitutional:       Appearance: Normal appearance. She is well-developed.   HENT:      Head: Atraumatic.   Cardiovascular:      Rate and Rhythm: Normal rate and regular rhythm.      Heart sounds: Normal heart sounds. No murmur heard.  Pulmonary:      Effort: Pulmonary effort is normal.      Breath sounds: Normal breath sounds.   Abdominal:      General: Bowel sounds are normal.      Palpations: Abdomen is soft.   Skin:     General: Skin is warm.   Neurological:      General: No focal deficit present.      Mental Status: She is alert.   Psychiatric:         Mood and Affect: Mood normal.         Lab on 01/15/2024   Component Date Value Ref Range Status    Hemoglobin A1C 01/15/2024 8.4 (H)  see below % Final    Estimated Average Glucose 01/15/2024 194  Not Established mg/dL Final    Glucose 01/15/2024 170 (H)  74 - 99 mg/dL Final    Sodium 01/15/2024 139  136 - 145 mmol/L Final     Potassium 01/15/2024 4.5  3.5 - 5.3 mmol/L Final    Chloride 01/15/2024 104  98 - 107 mmol/L Final    Bicarbonate 01/15/2024 28  21 - 32 mmol/L Final    Anion Gap 01/15/2024 12  10 - 20 mmol/L Final    Urea Nitrogen 01/15/2024 16  6 - 23 mg/dL Final    Creatinine 01/15/2024 0.67  0.50 - 1.05 mg/dL Final    eGFR 01/15/2024 >90  >60 mL/min/1.73m*2 Final    Calculations of estimated GFR are performed using the 2021 CKD-EPI Study Refit equation without the race variable for the IDMS-Traceable creatinine methods.  https://jasn.asnjournals.org/content/early/2021/09/22/ASN.9590351747    Calcium 01/15/2024 9.4  8.6 - 10.3 mg/dL Final    Albumin 01/15/2024 4.3  3.4 - 5.0 g/dL Final    Alkaline Phosphatase 01/15/2024 93  33 - 136 U/L Final    Total Protein 01/15/2024 6.7  6.4 - 8.2 g/dL Final    AST 01/15/2024 17  9 - 39 U/L Final    Bilirubin, Total 01/15/2024 0.5  0.0 - 1.2 mg/dL Final    ALT 01/15/2024 11  7 - 45 U/L Final    Patients treated with Sulfasalazine may generate falsely decreased results for ALT.   Lab on 12/11/2023   Component Date Value Ref Range Status    Glucose 12/11/2023 118 (H)  74 - 99 mg/dL Final    Sodium 12/11/2023 143  136 - 145 mmol/L Final    Potassium 12/11/2023 4.6  3.5 - 5.3 mmol/L Final    Chloride 12/11/2023 106  98 - 107 mmol/L Final    Bicarbonate 12/11/2023 28  21 - 32 mmol/L Final    Anion Gap 12/11/2023 14  10 - 20 mmol/L Final    Urea Nitrogen 12/11/2023 16  6 - 23 mg/dL Final    Creatinine 12/11/2023 0.56  0.50 - 1.05 mg/dL Final    eGFR 12/11/2023 >90  >60 mL/min/1.73m*2 Final    Calculations of estimated GFR are performed using the 2021 CKD-EPI Study Refit equation without the race variable for the IDMS-Traceable creatinine methods.  https://jasn.asnjournals.org/content/early/2021/09/22/ASN.6531911982    Calcium 12/11/2023 8.9  8.6 - 10.3 mg/dL Final       Assessment/Plan   Problem List Items Addressed This Visit             ICD-10-CM    Type 2 diabetes mellitus (CMS/[facility]) E11.9     "Relevant Medications    insulin lispro (HumaLOG) 100 unit/mL injection    pen needle, diabetic 31 gauge x 5/16\" needle    Hyperlipidemia E78.5    Relevant Medications    atorvastatin (Lipitor) 40 mg tablet    Benign essential hypertension I10    Relevant Medications    losartan (Cozaar) 50 mg tablet    Depressive disorder - Primary F32.A    Relevant Medications    DULoxetine (Cymbalta) 30 mg DR capsule   Patient is doing fair.  Needs to get A1c below 7.5.  Refilled pts meds.  Follow up in 3 mo.  Starting Mukund CGM.   Instructed on mealtime insulin.    "

## 2024-01-30 ENCOUNTER — PREP FOR PROCEDURE (OUTPATIENT)
Dept: UROLOGY | Facility: HOSPITAL | Age: 74
End: 2024-01-30
Payer: MEDICARE

## 2024-01-30 DIAGNOSIS — C68.9 UROTHELIAL CANCER (MULTI): Primary | ICD-10-CM

## 2024-01-30 DIAGNOSIS — R30.0 DYSURIA: ICD-10-CM

## 2024-01-30 RX ORDER — CHLORHEXIDINE GLUCONATE 40 MG/ML
SOLUTION TOPICAL DAILY PRN
Status: CANCELLED | OUTPATIENT
Start: 2024-01-30

## 2024-01-30 RX ORDER — SODIUM CHLORIDE 9 MG/ML
100 INJECTION, SOLUTION INTRAVENOUS CONTINUOUS
Status: CANCELLED | OUTPATIENT
Start: 2024-01-30

## 2024-01-31 PROBLEM — C68.9 UROTHELIAL CANCER (MULTI): Status: ACTIVE | Noted: 2024-01-30

## 2024-02-09 ENCOUNTER — OFFICE VISIT (OUTPATIENT)
Dept: ORTHOPEDIC SURGERY | Facility: CLINIC | Age: 74
End: 2024-02-09
Payer: MEDICARE

## 2024-02-09 VITALS — BODY MASS INDEX: 20.99 KG/M2 | WEIGHT: 126 LBS | HEIGHT: 65 IN

## 2024-02-09 DIAGNOSIS — M16.11 PRIMARY LOCALIZED OSTEOARTHRITIS OF RIGHT HIP: Primary | ICD-10-CM

## 2024-02-09 PROCEDURE — 20611 DRAIN/INJ JOINT/BURSA W/US: CPT | Performed by: EMERGENCY MEDICINE

## 2024-02-09 PROCEDURE — 4010F ACE/ARB THERAPY RXD/TAKEN: CPT | Performed by: EMERGENCY MEDICINE

## 2024-02-09 PROCEDURE — 1159F MED LIST DOCD IN RCRD: CPT | Performed by: EMERGENCY MEDICINE

## 2024-02-09 PROCEDURE — 99213 OFFICE O/P EST LOW 20 MIN: CPT | Performed by: EMERGENCY MEDICINE

## 2024-02-09 PROCEDURE — 3052F HG A1C>EQUAL 8.0%<EQUAL 9.0%: CPT | Performed by: EMERGENCY MEDICINE

## 2024-02-09 PROCEDURE — 1125F AMNT PAIN NOTED PAIN PRSNT: CPT | Performed by: EMERGENCY MEDICINE

## 2024-02-09 RX ORDER — LIDOCAINE HYDROCHLORIDE 10 MG/ML
4 INJECTION INFILTRATION; PERINEURAL
Status: COMPLETED | OUTPATIENT
Start: 2024-02-09 | End: 2024-02-09

## 2024-02-09 RX ORDER — METHYLPREDNISOLONE ACETATE 40 MG/ML
80 INJECTION, SUSPENSION INTRA-ARTICULAR; INTRALESIONAL; INTRAMUSCULAR; SOFT TISSUE
Status: COMPLETED | OUTPATIENT
Start: 2024-02-09 | End: 2024-02-09

## 2024-02-09 RX ADMIN — METHYLPREDNISOLONE ACETATE 80 MG: 40 INJECTION, SUSPENSION INTRA-ARTICULAR; INTRALESIONAL; INTRAMUSCULAR; SOFT TISSUE at 08:22

## 2024-02-09 RX ADMIN — LIDOCAINE HYDROCHLORIDE 4 ML: 10 INJECTION INFILTRATION; PERINEURAL at 08:22

## 2024-02-09 NOTE — PROGRESS NOTES
Subjective    Patient ID: Nina Oquendo is a 73 y.o. female.    Chief Complaint: Pain of the Right Hip (Injection 11/7/23, last injection lasted for about two months)     Last Surgery: No surgery found  Last Surgery Date: No surgery found    Nina is a 73-year-old female referred to me by Dr. Sanchez for acute on chronic right hip pain.  She has had pain that has been very significant for the past 2 years and would like to avoid a hip replacement if at all possible.  She takes Tylenol with minimal relief.  She has fallen in the past but denies any specific known traumatic injuries.  Her pain is all in her groin and is moderate in severity.  It radiates slightly laterally but is mostly anterior.  She has no numbness or weakness.  No lower back pain. We agreed to perform a cortisone injection of her right femoral acetabular joint under ultrasound guidance which she tolerated very well without any complications.  Activity modifications were reviewed.  She will also begin using naproxen twice daily for her pain and can follow-up with me on an as-needed basis if she responds well to this injection.  Eventually she would likely require hip replacement but she would like to postpone that as long as possible.    Update on 2/9/2024.  Nina is coming back in for a follow-up visit.  She says that after her injection on 11/7/2023 she had about 2 months of really good pain relief.  For the past several weeks though her pain has returned and she is here requesting another injection.  She did take a fall couple of weeks ago while going into her house from her patio but did not injure her hip.  She has no other complaints or today.        Objective   Right Hip Exam     Tenderness   The patient is experiencing no tenderness.     Range of Motion   Abduction:  abnormal   Adduction:  abnormal   Extension:  abnormal   Flexion:  abnormal   External rotation:  abnormal   Internal rotation:  abnormal     Muscle Strength   Abduction: 4/5    Adduction: 4/5   Flexion: 4/5     Tests   ELA: positive    Other   Erythema: absent  Sensation: normal  Pulse: present    Comments:  Examination grossly unchanged      Left Hip Exam   Left hip exam is normal.          Image Results:  No new imaging today    Patient ID: Nina Oquendo is a 73 y.o. female.    L Inj/Asp: R hip joint on 2/9/2024 8:22 AM  Indications: pain  Details: 20 G needle, ultrasound-guided anterior approach  Medications: 80 mg methylPREDNISolone acetate 40 mg/mL; 4 mL lidocaine 10 mg/mL (1 %)  Outcome: tolerated well, no immediate complications  Procedure, treatment alternatives, risks and benefits explained, specific risks discussed. Consent was given by the patient. Patient was prepped and draped in the usual sterile fashion.           Assessment/Plan   Encounter Diagnoses:  Primary localized osteoarthritis of right hip    Orders Placed This Encounter    L Inj/Asp    Point of Care Ultrasound     No follow-ups on file.    We discussed her treatment options and agreed to perform a repeat cortisone injection of her right femoral acetabular joint under ultrasound guidance which she tolerated very well without any complications.  Activity modifications were reviewed.  She will also continue using naproxen twice daily for her pain and can follow-up with me on an as-needed basis if she responds well to this injection again.  Eventually she will likely require a hip replacement but she would like to postpone that as long as possible.    ** Please excuse any errors in grammar or translation related to this dictation. Voice recognition software was utilized to prepare this document. **       Kyle Garber MD  [facility]

## 2024-02-15 ENCOUNTER — TELEMEDICINE CLINICAL SUPPORT (OUTPATIENT)
Dept: PREADMISSION TESTING | Facility: HOSPITAL | Age: 74
End: 2024-02-15
Payer: MEDICARE

## 2024-02-15 VITALS — BODY MASS INDEX: 20.83 KG/M2 | WEIGHT: 122 LBS | HEIGHT: 64 IN

## 2024-02-15 NOTE — PREPROCEDURE INSTRUCTIONS
Current Medications   Medication Instructions    atorvastatin (Lipitor) 40 mg tablet Continue until night before surgery    DULoxetine (Cymbalta) 30 mg DR capsule Take morning of surgery with sip of water, no other fluids    insulin lispro (HumaLOG) 100 unit/mL injection Hold day of surgery    losartan (Cozaar) 50 mg tablet Stop 1 day before surgery    metoprolol succinate XL (Toprol-XL) 50 mg 24 hr tablet Take morning of surgery with sip of water, no other fluids    omeprazole (PriLOSEC) 20 mg DR capsule Take morning of surgery with sip of water, no other fluids    ondansetron (Zofran) 4 mg tablet Take morning of surgery with sip of water, no other fluids                       NPO Instructions:    Do not eat any food after midnight the night before your surgery/procedure.    Additional Instructions:     Seven/Six Days before Surgery:  Review your medication instructions, stop indicated medications  Five Days before Surgery:  Review your medication instructions, stop indicated medications  Three Days before Surgery:  Review your medication instructions, stop indicated medications  The Day before Surgery:  Review your medication instructions, stop indicated medications  You will be contacted regarding the time of your arrival to facility and surgery time  Do not eat any food after Midnight  Day of Surgery:  Review your medication instructions, take indicated medications  If you have diabetes, please check your fasting blood sugar upon awakening.  If fasting blood sugar is <80 mg/dl, drink 100 ml of apple juice, time limit of 2 hours before  Wear  comfortable loose fitting clothing  Do not use moisturizers, creams, lotions or perfume  All jewelry and valuables should be left at home  PAT DISCHARGE INSTRUCTIONS    Please call the Same Day Surgery (SDS) Department of the hospital where your procedure will be performed between 2:00- 3:30 PM the day before your surgery. If you are scheduled on a Monday, or a Tuesday  following a Monday holiday, you will need to call on the last business day prior to your surgery.    [facility]  [address] [place] [address]  [phone]    Please let your surgeon know if:      You develop any open sores, shingles, burning or painful urination as these may increase your risk of an infection.   You no longer wish to have the surgery.   Any other personal circumstances change that may lead to the need to cancel or defer this surgery-such as being sick or getting admitted to any hospital within one week of your planned procedure.    Your contact details change, such as a change of address or phone number.    Starting now:     Please DO NOT drink alcohol or smoke for 24 hours before surgery. It is well known that quitting smoking can make a huge difference to your health and recovery from surgery. The longer you abstain from smoking, the better your chances of a healthy recovery. If you need help with quitting, call 5-888-QUIT-NOW to be connected to a trained counselor who will discuss the best methods to help you quit.     Before your surgery:    Please stop all supplements 7 days prior to surgery. Or as directed by your surgeon.   Please stop taking NSAID pain medicine such as Advil and Motrin 7 days before surgery.    If you develop any fever, cough, cold, rashes, cuts, scratches, scrapes, urinary symptoms or infection anywhere on your body (including teeth and gums) prior to surgery, please call your surgeon’s office as soon as possible. This may require treatment to reduce the chance of cancellation on the day of surgery.    The day before your surgery:   DIET- Do not eat any food after MIDNIGHT.   Get a good night’s rest.  Use the special soap for bathing if you have been instructed to use one.    Scheduled surgery times may change and you will be notified if this occurs - please check your personal voicemail for any updates.     On the morning of  surgery:   Wear comfortable, loose fitting clothes which open in the front. Please do not wear moisturizers, creams, lotions, makeup or perfume.    Please bring with you to surgery:   Photo ID and insurance card   Current list of medicines and allergies   Pacemaker/ Defibrillator/Heart stent cards   CPAP machine and mask    Slings/ splints/ crutches   A copy of your complete advanced directive/DHPOA.    Please do NOT bring with you to surgery:   All jewelry and valuables should be left at home.   Prosthetic devices such as contact lenses, hearing aids, dentures, eyelash extensions, hairpins and body piercings must be removed prior to going in to the surgical suite.    After outpatient surgery:   A responsible adult MUST accompany you at the time of discharge and stay with you for 24 hours after your surgery. You may NOT drive yourself home after surgery.    Do not drive, operate machinery, make critical decisions or do activities that require co-ordination or balance until after a night’s sleep.   Do not drink alcoholic beverages for 24 hours.   Instructions for resuming your medications will be provided by your surgeon.    CALL YOUR DOCTOR AFTER SURGERY IF YOU HAVE:     Chills and/or a fever of 101° F or higher.    Redness, swelling, pus or drainage from your surgical wound or a bad smell from the wound.    Lightheadedness, fainting or confusion.    Persistent vomiting (throwing up) and are not able to eat or drink for 12 hours.    Three or more loose, watery bowel movements in 24 hours (diarrhea).   Difficulty or pain while urinating( after non-urological surgery)    Pain and swelling in your legs, especially if it is only on one side.    Difficulty breathing or are breathing faster than normal.    Any new concerning symptoms.      Reviewed pre-op instructions with patient, states understanding and denies further questions at this time.    If you have not received a call regarding your arrival time for surgery by  2pm on the day before surgery, you can call [phone].    Take Care

## 2024-02-20 ENCOUNTER — APPOINTMENT (OUTPATIENT)
Dept: PREADMISSION TESTING | Facility: HOSPITAL | Age: 74
End: 2024-02-20
Payer: MEDICARE

## 2024-02-21 ENCOUNTER — LAB (OUTPATIENT)
Dept: LAB | Facility: LAB | Age: 74
End: 2024-02-21
Payer: MEDICARE

## 2024-02-21 ENCOUNTER — OFFICE VISIT (OUTPATIENT)
Dept: PRIMARY CARE | Facility: CLINIC | Age: 74
End: 2024-02-21
Payer: MEDICARE

## 2024-02-21 VITALS
HEART RATE: 106 BPM | OXYGEN SATURATION: 96 % | HEIGHT: 65 IN | TEMPERATURE: 97.5 F | WEIGHT: 127 LBS | DIASTOLIC BLOOD PRESSURE: 62 MMHG | SYSTOLIC BLOOD PRESSURE: 102 MMHG | BODY MASS INDEX: 21.16 KG/M2

## 2024-02-21 DIAGNOSIS — Z01.818 PREOPERATIVE CLEARANCE: Primary | ICD-10-CM

## 2024-02-21 DIAGNOSIS — C68.9 UROTHELIAL CANCER (MULTI): ICD-10-CM

## 2024-02-21 LAB
ANION GAP SERPL CALC-SCNC: 11 MMOL/L (ref 10–20)
APPEARANCE UR: ABNORMAL
BILIRUB UR STRIP.AUTO-MCNC: NEGATIVE MG/DL
BUN SERPL-MCNC: 16 MG/DL (ref 6–23)
CALCIUM SERPL-MCNC: 9 MG/DL (ref 8.6–10.3)
CHLORIDE SERPL-SCNC: 105 MMOL/L (ref 98–107)
CO2 SERPL-SCNC: 26 MMOL/L (ref 21–32)
COLOR UR: YELLOW
CREAT SERPL-MCNC: 0.6 MG/DL (ref 0.5–1.05)
EGFRCR SERPLBLD CKD-EPI 2021: >90 ML/MIN/1.73M*2
ERYTHROCYTE [DISTWIDTH] IN BLOOD BY AUTOMATED COUNT: 13.4 % (ref 11.5–14.5)
GLUCOSE SERPL-MCNC: 174 MG/DL (ref 74–99)
GLUCOSE UR STRIP.AUTO-MCNC: NEGATIVE MG/DL
HCT VFR BLD AUTO: 45.2 % (ref 36–46)
HGB BLD-MCNC: 14.9 G/DL (ref 12–16)
HOLD SPECIMEN: NORMAL
KETONES UR STRIP.AUTO-MCNC: NEGATIVE MG/DL
LEUKOCYTE ESTERASE UR QL STRIP.AUTO: NEGATIVE
MCH RBC QN AUTO: 30.7 PG (ref 26–34)
MCHC RBC AUTO-ENTMCNC: 33 G/DL (ref 32–36)
MCV RBC AUTO: 93 FL (ref 80–100)
NITRITE UR QL STRIP.AUTO: NEGATIVE
NRBC BLD-RTO: 0 /100 WBCS (ref 0–0)
PH UR STRIP.AUTO: 5.5 [PH]
PLATELET # BLD AUTO: 262 X10*3/UL (ref 150–450)
POTASSIUM SERPL-SCNC: 4.2 MMOL/L (ref 3.5–5.3)
PROT UR STRIP.AUTO-MCNC: ABNORMAL MG/DL
RBC # BLD AUTO: 4.85 X10*6/UL (ref 4–5.2)
RBC # UR STRIP.AUTO: ABNORMAL /UL
RBC #/AREA URNS AUTO: NORMAL /HPF
SODIUM SERPL-SCNC: 138 MMOL/L (ref 136–145)
SP GR UR STRIP.AUTO: >1.03
SQUAMOUS #/AREA URNS AUTO: NORMAL /HPF
UROBILINOGEN UR STRIP.AUTO-MCNC: ABNORMAL MG/DL
WBC # BLD AUTO: 8.8 X10*3/UL (ref 4.4–11.3)
WBC #/AREA URNS AUTO: NORMAL /HPF

## 2024-02-21 PROCEDURE — 3052F HG A1C>EQUAL 8.0%<EQUAL 9.0%: CPT | Performed by: FAMILY MEDICINE

## 2024-02-21 PROCEDURE — 1160F RVW MEDS BY RX/DR IN RCRD: CPT | Performed by: FAMILY MEDICINE

## 2024-02-21 PROCEDURE — 81001 URINALYSIS AUTO W/SCOPE: CPT

## 2024-02-21 PROCEDURE — 3074F SYST BP LT 130 MM HG: CPT | Performed by: FAMILY MEDICINE

## 2024-02-21 PROCEDURE — 85027 COMPLETE CBC AUTOMATED: CPT

## 2024-02-21 PROCEDURE — 3078F DIAST BP <80 MM HG: CPT | Performed by: FAMILY MEDICINE

## 2024-02-21 PROCEDURE — 80048 BASIC METABOLIC PNL TOTAL CA: CPT

## 2024-02-21 PROCEDURE — 1159F MED LIST DOCD IN RCRD: CPT | Performed by: FAMILY MEDICINE

## 2024-02-21 PROCEDURE — 36415 COLL VENOUS BLD VENIPUNCTURE: CPT

## 2024-02-21 PROCEDURE — 4010F ACE/ARB THERAPY RXD/TAKEN: CPT | Performed by: FAMILY MEDICINE

## 2024-02-21 PROCEDURE — 99214 OFFICE O/P EST MOD 30 MIN: CPT | Performed by: FAMILY MEDICINE

## 2024-02-21 PROCEDURE — 1125F AMNT PAIN NOTED PAIN PRSNT: CPT | Performed by: FAMILY MEDICINE

## 2024-02-21 PROCEDURE — 93000 ELECTROCARDIOGRAM COMPLETE: CPT | Performed by: FAMILY MEDICINE

## 2024-02-21 NOTE — PROGRESS NOTES
Subjective   Patient ID: Nina Oquendo is a 73 y.o. female who presents for Pre-op Exam (Pre-Op Eval for Cystoscopy with retrograde pyelogram with Dr. Bjorn Ling on 2/27/24 at [facility]).  HPI      Patient presents for preop evaluation for an upcoming cystoscopy with retrograde pyelogram under Dr. Bjorn Ling.  She reports that she is doing well overall.  Her blood sugars have gotten better with her use of Humalog.  Appetite is good.  Breathing is normal.  No fevers chills or nausea.    Past Medical History:   Diagnosis Date    Anxiety     Arthritis     COPD (chronic obstructive pulmonary disease) (CMS/[facility])     Depression     GERD (gastroesophageal reflux disease)     Hyperlipidemia     Hypertension     Type 2 diabetes mellitus without complications (CMS/[facility]) 12/04/2013    Diabetes mellitus         Patient Active Problem List   Diagnosis    Type 2 diabetes mellitus (CMS/[facility])    Hyperlipidemia    Chronic obstructive lung disease (CMS/[facility])    Benign essential hypertension    Essential hypertension    Depressive disorder    Protein-calorie malnutrition, unspecified severity (CMS/[facility])    Anxiety disorder, unspecified    Gastroesophageal reflux disease without esophagitis    Urothelial cancer (CMS/[facility])       Social Connections: Not on file       Current Outpatient Medications on File Prior to Visit   Medication Sig Dispense Refill    atorvastatin (Lipitor) 40 mg tablet Take 1 tablet (40 mg) by mouth once daily. 90 tablet 3    blood sugar diagnostic (Prodigy No Coding) strip Prodigy Pressure Use to check 2x a day 200 strip 0    blood-glucose sensor (FreeStyle Mukund 3 Sensor) device Apply under skin [id number] 6 each 3    DULoxetine (Cymbalta) 30 mg DR capsule Take 1 capsule (30 mg) by mouth once daily. Do not crush or chew. 90 capsule 1    FreeStyle lancets 28 gauge 1 each 2 times a day. Prodigy Pressure activated safety lancets 28G To check 2x a day, 3 mo supply 200 each 0    insulin lispro (HumaLOG) 100 unit/mL  "injection Inject 5 Units under the skin 3 times a day with meals. Take as directed per insulin instructions 13.5 mL 3    losartan (Cozaar) 50 mg tablet Take 1 tablet (50 mg) by mouth once daily. 90 tablet 3    metoprolol succinate XL (Toprol-XL) 50 mg 24 hr tablet Take 1 tablet (50 mg) by mouth once daily. 90 tablet 3    omeprazole (PriLOSEC) 20 mg DR capsule Take 1 capsule (20 mg) by mouth early in the morning..      omeprazole OTC (PriLOSEC OTC) 20 mg EC tablet Take 1 tablet (20 mg) by mouth once daily in the morning. Take before meals. 90 tablet 3    ondansetron (Zofran) 4 mg tablet Take 1 tablet (4 mg) by mouth every 8 hours if needed for nausea.      pen needle, diabetic 31 gauge x 3/16\" needle 1 each once daily. 100 each 3    pen needle, diabetic 31 gauge x 5/16\" needle 1 each 3 times a day. 300 each 3    [DISCONTINUED] cyclobenzaprine (Flexeril) 5 mg tablet Take 1 tablet (5 mg) by mouth every 8 hours if needed.       No current facility-administered medications on file prior to visit.        Vitals:    02/21/24 1219   BP: 102/62   Pulse: 106   Temp: 36.4 °C (97.5 °F)   SpO2: 96%     Vitals:    02/21/24 1219   Weight: 57.6 kg (127 lb)       Review of Systems   All other systems reviewed and are negative.      Objective     Physical Exam  Vitals reviewed.   Constitutional:       General: She is not in acute distress.     Appearance: Normal appearance. She is well-developed. She is not diaphoretic.   HENT:      Head: Normocephalic and atraumatic.      Right Ear: Tympanic membrane normal.      Left Ear: Tympanic membrane normal.      Nose: Nose normal.      Mouth/Throat:      Mouth: Mucous membranes are moist.   Eyes:      Pupils: Pupils are equal, round, and reactive to light.   Cardiovascular:      Rate and Rhythm: Normal rate and regular rhythm.      Heart sounds: Normal heart sounds. No murmur heard.     No friction rub. No gallop.   Pulmonary:      Effort: Pulmonary effort is normal.      Breath sounds: " Normal breath sounds. No rales.   Abdominal:      General: Bowel sounds are normal.      Palpations: Abdomen is soft.      Tenderness: There is no abdominal tenderness.   Musculoskeletal:      Cervical back: Normal range of motion and neck supple.   Skin:     General: Skin is warm and dry.   Neurological:      Mental Status: She is alert.   Psychiatric:         Mood and Affect: Mood normal.         Lab on 02/21/2024   Component Date Value Ref Range Status    Glucose 02/21/2024 174 (H)  74 - 99 mg/dL Final    Sodium 02/21/2024 138  136 - 145 mmol/L Final    Potassium 02/21/2024 4.2  3.5 - 5.3 mmol/L Final    Chloride 02/21/2024 105  98 - 107 mmol/L Final    Bicarbonate 02/21/2024 26  21 - 32 mmol/L Final    Anion Gap 02/21/2024 11  10 - 20 mmol/L Final    Urea Nitrogen 02/21/2024 16  6 - 23 mg/dL Final    Creatinine 02/21/2024 0.60  0.50 - 1.05 mg/dL Final    eGFR 02/21/2024 >90  >60 mL/min/1.73m*2 Final    Calculations of estimated GFR are performed using the 2021 CKD-EPI Study Refit equation without the race variable for the IDMS-Traceable creatinine methods.  https://jasn.asnjournals.org/content/early/2021/09/22/ASN.3267864498    Calcium 02/21/2024 9.0  8.6 - 10.3 mg/dL Final    WBC 02/21/2024 8.8  4.4 - 11.3 x10*3/uL Final    nRBC 02/21/2024 0.0  0.0 - 0.0 /100 WBCs Final    RBC 02/21/2024 4.85  4.00 - 5.20 x10*6/uL Final    Hemoglobin 02/21/2024 14.9  12.0 - 16.0 g/dL Final    Hematocrit 02/21/2024 45.2  36.0 - 46.0 % Final    MCV 02/21/2024 93  80 - 100 fL Final    MCH 02/21/2024 30.7  26.0 - 34.0 pg Final    MCHC 02/21/2024 33.0  32.0 - 36.0 g/dL Final    RDW 02/21/2024 13.4  11.5 - 14.5 % Final    Platelets 02/21/2024 262  150 - 450 x10*3/uL Final   Lab on 01/15/2024   Component Date Value Ref Range Status    Hemoglobin A1C 01/15/2024 8.4 (H)  see below % Final    Estimated Average Glucose 01/15/2024 194  Not Established mg/dL Final    Glucose 01/15/2024 170 (H)  74 - 99 mg/dL Final    Sodium 01/15/2024 139   136 - 145 mmol/L Final    Potassium 01/15/2024 4.5  3.5 - 5.3 mmol/L Final    Chloride 01/15/2024 104  98 - 107 mmol/L Final    Bicarbonate 01/15/2024 28  21 - 32 mmol/L Final    Anion Gap 01/15/2024 12  10 - 20 mmol/L Final    Urea Nitrogen 01/15/2024 16  6 - 23 mg/dL Final    Creatinine 01/15/2024 0.67  0.50 - 1.05 mg/dL Final    eGFR 01/15/2024 >90  >60 mL/min/1.73m*2 Final    Calculations of estimated GFR are performed using the 2021 CKD-EPI Study Refit equation without the race variable for the IDMS-Traceable creatinine methods.  https://jasn.asnjournals.org/content/early/2021/09/22/ASN.5451286120    Calcium 01/15/2024 9.4  8.6 - 10.3 mg/dL Final    Albumin 01/15/2024 4.3  3.4 - 5.0 g/dL Final    Alkaline Phosphatase 01/15/2024 93  33 - 136 U/L Final    Total Protein 01/15/2024 6.7  6.4 - 8.2 g/dL Final    AST 01/15/2024 17  9 - 39 U/L Final    Bilirubin, Total 01/15/2024 0.5  0.0 - 1.2 mg/dL Final    ALT 01/15/2024 11  7 - 45 U/L Final    Patients treated with Sulfasalazine may generate falsely decreased results for ALT.       Assessment/Plan   Problem List Items Addressed This Visit             ICD-10-CM    Urothelial cancer (CMS/HCC) C68.9     Other Visit Diagnoses         Codes    Preoperative clearance    -  Primary Z01.818    Relevant Orders    ECG 12 Lead (Completed)          EKG shows RBBB which is stable from previous EKG in 2023.  Awaiting BW results.  If they are normal will clear pt for procedure.

## 2024-02-21 NOTE — H&P (VIEW-ONLY)
Subjective   Patient ID: Nina Oquendo is a 73 y.o. female who presents for Pre-op Exam (Pre-Op Eval for Cystoscopy with retrograde pyelogram with Dr. Bjorn Ling on 2/27/24 at [facility]).  HPI      Patient presents for preop evaluation for an upcoming cystoscopy with retrograde pyelogram under Dr. Bjorn Ling.  She reports that she is doing well overall.  Her blood sugars have gotten better with her use of Humalog.  Appetite is good.  Breathing is normal.  No fevers chills or nausea.    Past Medical History:   Diagnosis Date    Anxiety     Arthritis     COPD (chronic obstructive pulmonary disease) (CMS/[facility])     Depression     GERD (gastroesophageal reflux disease)     Hyperlipidemia     Hypertension     Type 2 diabetes mellitus without complications (CMS/[facility]) 12/04/2013    Diabetes mellitus         Patient Active Problem List   Diagnosis    Type 2 diabetes mellitus (CMS/[facility])    Hyperlipidemia    Chronic obstructive lung disease (CMS/[facility])    Benign essential hypertension    Essential hypertension    Depressive disorder    Protein-calorie malnutrition, unspecified severity (CMS/[facility])    Anxiety disorder, unspecified    Gastroesophageal reflux disease without esophagitis    Urothelial cancer (CMS/[facility])       Social Connections: Not on file       Current Outpatient Medications on File Prior to Visit   Medication Sig Dispense Refill    atorvastatin (Lipitor) 40 mg tablet Take 1 tablet (40 mg) by mouth once daily. 90 tablet 3    blood sugar diagnostic (Prodigy No Coding) strip Prodigy Pressure Use to check 2x a day 200 strip 0    blood-glucose sensor (FreeStyle Mukund 3 Sensor) device Apply under skin [id number] 6 each 3    DULoxetine (Cymbalta) 30 mg DR capsule Take 1 capsule (30 mg) by mouth once daily. Do not crush or chew. 90 capsule 1    FreeStyle lancets 28 gauge 1 each 2 times a day. Prodigy Pressure activated safety lancets 28G To check 2x a day, 3 mo supply 200 each 0    insulin lispro (HumaLOG) 100 unit/mL  "injection Inject 5 Units under the skin 3 times a day with meals. Take as directed per insulin instructions 13.5 mL 3    losartan (Cozaar) 50 mg tablet Take 1 tablet (50 mg) by mouth once daily. 90 tablet 3    metoprolol succinate XL (Toprol-XL) 50 mg 24 hr tablet Take 1 tablet (50 mg) by mouth once daily. 90 tablet 3    omeprazole (PriLOSEC) 20 mg DR capsule Take 1 capsule (20 mg) by mouth early in the morning..      omeprazole OTC (PriLOSEC OTC) 20 mg EC tablet Take 1 tablet (20 mg) by mouth once daily in the morning. Take before meals. 90 tablet 3    ondansetron (Zofran) 4 mg tablet Take 1 tablet (4 mg) by mouth every 8 hours if needed for nausea.      pen needle, diabetic 31 gauge x 3/16\" needle 1 each once daily. 100 each 3    pen needle, diabetic 31 gauge x 5/16\" needle 1 each 3 times a day. 300 each 3    [DISCONTINUED] cyclobenzaprine (Flexeril) 5 mg tablet Take 1 tablet (5 mg) by mouth every 8 hours if needed.       No current facility-administered medications on file prior to visit.        Vitals:    02/21/24 1219   BP: 102/62   Pulse: 106   Temp: 36.4 °C (97.5 °F)   SpO2: 96%     Vitals:    02/21/24 1219   Weight: 57.6 kg (127 lb)       Review of Systems   All other systems reviewed and are negative.      Objective     Physical Exam  Vitals reviewed.   Constitutional:       General: She is not in acute distress.     Appearance: Normal appearance. She is well-developed. She is not diaphoretic.   HENT:      Head: Normocephalic and atraumatic.      Right Ear: Tympanic membrane normal.      Left Ear: Tympanic membrane normal.      Nose: Nose normal.      Mouth/Throat:      Mouth: Mucous membranes are moist.   Eyes:      Pupils: Pupils are equal, round, and reactive to light.   Cardiovascular:      Rate and Rhythm: Normal rate and regular rhythm.      Heart sounds: Normal heart sounds. No murmur heard.     No friction rub. No gallop.   Pulmonary:      Effort: Pulmonary effort is normal.      Breath sounds: " Normal breath sounds. No rales.   Abdominal:      General: Bowel sounds are normal.      Palpations: Abdomen is soft.      Tenderness: There is no abdominal tenderness.   Musculoskeletal:      Cervical back: Normal range of motion and neck supple.   Skin:     General: Skin is warm and dry.   Neurological:      Mental Status: She is alert.   Psychiatric:         Mood and Affect: Mood normal.         Lab on 02/21/2024   Component Date Value Ref Range Status    Glucose 02/21/2024 174 (H)  74 - 99 mg/dL Final    Sodium 02/21/2024 138  136 - 145 mmol/L Final    Potassium 02/21/2024 4.2  3.5 - 5.3 mmol/L Final    Chloride 02/21/2024 105  98 - 107 mmol/L Final    Bicarbonate 02/21/2024 26  21 - 32 mmol/L Final    Anion Gap 02/21/2024 11  10 - 20 mmol/L Final    Urea Nitrogen 02/21/2024 16  6 - 23 mg/dL Final    Creatinine 02/21/2024 0.60  0.50 - 1.05 mg/dL Final    eGFR 02/21/2024 >90  >60 mL/min/1.73m*2 Final    Calculations of estimated GFR are performed using the 2021 CKD-EPI Study Refit equation without the race variable for the IDMS-Traceable creatinine methods.  https://jasn.asnjournals.org/content/early/2021/09/22/ASN.0689113421    Calcium 02/21/2024 9.0  8.6 - 10.3 mg/dL Final    WBC 02/21/2024 8.8  4.4 - 11.3 x10*3/uL Final    nRBC 02/21/2024 0.0  0.0 - 0.0 /100 WBCs Final    RBC 02/21/2024 4.85  4.00 - 5.20 x10*6/uL Final    Hemoglobin 02/21/2024 14.9  12.0 - 16.0 g/dL Final    Hematocrit 02/21/2024 45.2  36.0 - 46.0 % Final    MCV 02/21/2024 93  80 - 100 fL Final    MCH 02/21/2024 30.7  26.0 - 34.0 pg Final    MCHC 02/21/2024 33.0  32.0 - 36.0 g/dL Final    RDW 02/21/2024 13.4  11.5 - 14.5 % Final    Platelets 02/21/2024 262  150 - 450 x10*3/uL Final   Lab on 01/15/2024   Component Date Value Ref Range Status    Hemoglobin A1C 01/15/2024 8.4 (H)  see below % Final    Estimated Average Glucose 01/15/2024 194  Not Established mg/dL Final    Glucose 01/15/2024 170 (H)  74 - 99 mg/dL Final    Sodium 01/15/2024 139   136 - 145 mmol/L Final    Potassium 01/15/2024 4.5  3.5 - 5.3 mmol/L Final    Chloride 01/15/2024 104  98 - 107 mmol/L Final    Bicarbonate 01/15/2024 28  21 - 32 mmol/L Final    Anion Gap 01/15/2024 12  10 - 20 mmol/L Final    Urea Nitrogen 01/15/2024 16  6 - 23 mg/dL Final    Creatinine 01/15/2024 0.67  0.50 - 1.05 mg/dL Final    eGFR 01/15/2024 >90  >60 mL/min/1.73m*2 Final    Calculations of estimated GFR are performed using the 2021 CKD-EPI Study Refit equation without the race variable for the IDMS-Traceable creatinine methods.  https://jasn.asnjournals.org/content/early/2021/09/22/ASN.1496816786    Calcium 01/15/2024 9.4  8.6 - 10.3 mg/dL Final    Albumin 01/15/2024 4.3  3.4 - 5.0 g/dL Final    Alkaline Phosphatase 01/15/2024 93  33 - 136 U/L Final    Total Protein 01/15/2024 6.7  6.4 - 8.2 g/dL Final    AST 01/15/2024 17  9 - 39 U/L Final    Bilirubin, Total 01/15/2024 0.5  0.0 - 1.2 mg/dL Final    ALT 01/15/2024 11  7 - 45 U/L Final    Patients treated with Sulfasalazine may generate falsely decreased results for ALT.       Assessment/Plan   Problem List Items Addressed This Visit             ICD-10-CM    Urothelial cancer (CMS/HCC) C68.9     Other Visit Diagnoses         Codes    Preoperative clearance    -  Primary Z01.818    Relevant Orders    ECG 12 Lead (Completed)          EKG shows RBBB which is stable from previous EKG in 2023.  Awaiting BW results.  If they are normal will clear pt for procedure.

## 2024-02-24 ENCOUNTER — ANESTHESIA EVENT (OUTPATIENT)
Dept: OPERATING ROOM | Facility: HOSPITAL | Age: 74
End: 2024-02-24
Payer: MEDICARE

## 2024-02-27 ENCOUNTER — APPOINTMENT (OUTPATIENT)
Dept: RADIOLOGY | Facility: HOSPITAL | Age: 74
End: 2024-02-27
Payer: MEDICARE

## 2024-02-27 ENCOUNTER — HOSPITAL ENCOUNTER (OUTPATIENT)
Facility: HOSPITAL | Age: 74
Setting detail: OUTPATIENT SURGERY
Discharge: HOME | End: 2024-02-27
Attending: STUDENT IN AN ORGANIZED HEALTH CARE EDUCATION/TRAINING PROGRAM | Admitting: STUDENT IN AN ORGANIZED HEALTH CARE EDUCATION/TRAINING PROGRAM
Payer: MEDICARE

## 2024-02-27 ENCOUNTER — ANESTHESIA (OUTPATIENT)
Dept: OPERATING ROOM | Facility: HOSPITAL | Age: 74
End: 2024-02-27
Payer: MEDICARE

## 2024-02-27 VITALS
BODY MASS INDEX: 20.24 KG/M2 | RESPIRATION RATE: 14 BRPM | SYSTOLIC BLOOD PRESSURE: 114 MMHG | HEIGHT: 65 IN | HEART RATE: 65 BPM | OXYGEN SATURATION: 98 % | TEMPERATURE: 97.3 F | DIASTOLIC BLOOD PRESSURE: 63 MMHG | WEIGHT: 121.47 LBS

## 2024-02-27 DIAGNOSIS — C68.9 UROTHELIAL CANCER (MULTI): ICD-10-CM

## 2024-02-27 PROCEDURE — 3700000001 HC GENERAL ANESTHESIA TIME - INITIAL BASE CHARGE: Performed by: STUDENT IN AN ORGANIZED HEALTH CARE EDUCATION/TRAINING PROGRAM

## 2024-02-27 PROCEDURE — 3600000003 HC OR TIME - INITIAL BASE CHARGE - PROCEDURE LEVEL THREE: Performed by: STUDENT IN AN ORGANIZED HEALTH CARE EDUCATION/TRAINING PROGRAM

## 2024-02-27 PROCEDURE — 88112 CYTOPATH CELL ENHANCE TECH: CPT | Performed by: PATHOLOGY

## 2024-02-27 PROCEDURE — 7100000001 HC RECOVERY ROOM TIME - INITIAL BASE CHARGE: Performed by: STUDENT IN AN ORGANIZED HEALTH CARE EDUCATION/TRAINING PROGRAM

## 2024-02-27 PROCEDURE — 7100000002 HC RECOVERY ROOM TIME - EACH INCREMENTAL 1 MINUTE: Performed by: STUDENT IN AN ORGANIZED HEALTH CARE EDUCATION/TRAINING PROGRAM

## 2024-02-27 PROCEDURE — 2550000001 HC RX 255 CONTRASTS: Performed by: STUDENT IN AN ORGANIZED HEALTH CARE EDUCATION/TRAINING PROGRAM

## 2024-02-27 PROCEDURE — 74420 UROGRAPHY RTRGR +-KUB: CPT

## 2024-02-27 PROCEDURE — C1769 GUIDE WIRE: HCPCS | Performed by: STUDENT IN AN ORGANIZED HEALTH CARE EDUCATION/TRAINING PROGRAM

## 2024-02-27 PROCEDURE — 2720000007 HC OR 272 NO HCPCS: Performed by: STUDENT IN AN ORGANIZED HEALTH CARE EDUCATION/TRAINING PROGRAM

## 2024-02-27 PROCEDURE — 2500000004 HC RX 250 GENERAL PHARMACY W/ HCPCS (ALT 636 FOR OP/ED): Performed by: STUDENT IN AN ORGANIZED HEALTH CARE EDUCATION/TRAINING PROGRAM

## 2024-02-27 PROCEDURE — 99100 ANES PT EXTEME AGE<1 YR&>70: CPT | Performed by: ANESTHESIOLOGY

## 2024-02-27 PROCEDURE — A52005 PR CYSTOURETHROSCOPY,URETER CATHETER: Performed by: ANESTHESIOLOGIST ASSISTANT

## 2024-02-27 PROCEDURE — 52351 CYSTOURETERO & OR PYELOSCOPE: CPT | Performed by: STUDENT IN AN ORGANIZED HEALTH CARE EDUCATION/TRAINING PROGRAM

## 2024-02-27 PROCEDURE — 74420 UROGRAPHY RTRGR +-KUB: CPT | Performed by: STUDENT IN AN ORGANIZED HEALTH CARE EDUCATION/TRAINING PROGRAM

## 2024-02-27 PROCEDURE — 7100000010 HC PHASE TWO TIME - EACH INCREMENTAL 1 MINUTE: Performed by: STUDENT IN AN ORGANIZED HEALTH CARE EDUCATION/TRAINING PROGRAM

## 2024-02-27 PROCEDURE — 3600000008 HC OR TIME - EACH INCREMENTAL 1 MINUTE - PROCEDURE LEVEL THREE: Performed by: STUDENT IN AN ORGANIZED HEALTH CARE EDUCATION/TRAINING PROGRAM

## 2024-02-27 PROCEDURE — 7100000009 HC PHASE TWO TIME - INITIAL BASE CHARGE: Performed by: STUDENT IN AN ORGANIZED HEALTH CARE EDUCATION/TRAINING PROGRAM

## 2024-02-27 PROCEDURE — 88112 CYTOPATH CELL ENHANCE TECH: CPT | Mod: TC,MCY,BEALAB,WESLAB | Performed by: STUDENT IN AN ORGANIZED HEALTH CARE EDUCATION/TRAINING PROGRAM

## 2024-02-27 PROCEDURE — 3700000002 HC GENERAL ANESTHESIA TIME - EACH INCREMENTAL 1 MINUTE: Performed by: STUDENT IN AN ORGANIZED HEALTH CARE EDUCATION/TRAINING PROGRAM

## 2024-02-27 PROCEDURE — A52005 PR CYSTOURETHROSCOPY,URETER CATHETER: Performed by: ANESTHESIOLOGY

## 2024-02-27 PROCEDURE — 2500000004 HC RX 250 GENERAL PHARMACY W/ HCPCS (ALT 636 FOR OP/ED): Performed by: ANESTHESIOLOGIST ASSISTANT

## 2024-02-27 RX ORDER — LIDOCAINE HYDROCHLORIDE 10 MG/ML
INJECTION, SOLUTION INTRAVENOUS AS NEEDED
Status: DISCONTINUED | OUTPATIENT
Start: 2024-02-27 | End: 2024-02-27

## 2024-02-27 RX ORDER — SODIUM CHLORIDE, SODIUM LACTATE, POTASSIUM CHLORIDE, CALCIUM CHLORIDE 600; 310; 30; 20 MG/100ML; MG/100ML; MG/100ML; MG/100ML
INJECTION, SOLUTION INTRAVENOUS CONTINUOUS PRN
Status: DISCONTINUED | OUTPATIENT
Start: 2024-02-27 | End: 2024-02-27

## 2024-02-27 RX ORDER — CHLORHEXIDINE GLUCONATE 40 MG/ML
SOLUTION TOPICAL DAILY PRN
Status: DISCONTINUED | OUTPATIENT
Start: 2024-02-27 | End: 2024-02-27 | Stop reason: HOSPADM

## 2024-02-27 RX ORDER — OXYCODONE HYDROCHLORIDE 5 MG/1
5 TABLET ORAL EVERY 4 HOURS PRN
Status: CANCELLED | OUTPATIENT
Start: 2024-02-27

## 2024-02-27 RX ORDER — ONDANSETRON HYDROCHLORIDE 2 MG/ML
INJECTION, SOLUTION INTRAVENOUS AS NEEDED
Status: DISCONTINUED | OUTPATIENT
Start: 2024-02-27 | End: 2024-02-27

## 2024-02-27 RX ORDER — LABETALOL HYDROCHLORIDE 5 MG/ML
5 INJECTION, SOLUTION INTRAVENOUS ONCE AS NEEDED
Status: CANCELLED | OUTPATIENT
Start: 2024-02-27

## 2024-02-27 RX ORDER — SODIUM CHLORIDE, SODIUM LACTATE, POTASSIUM CHLORIDE, CALCIUM CHLORIDE 600; 310; 30; 20 MG/100ML; MG/100ML; MG/100ML; MG/100ML
100 INJECTION, SOLUTION INTRAVENOUS CONTINUOUS
Status: CANCELLED | OUTPATIENT
Start: 2024-02-27

## 2024-02-27 RX ORDER — CEFAZOLIN SODIUM 2 G/100ML
2 INJECTION, SOLUTION INTRAVENOUS ONCE
Status: COMPLETED | OUTPATIENT
Start: 2024-02-27 | End: 2024-02-27

## 2024-02-27 RX ORDER — SODIUM CHLORIDE 9 MG/ML
100 INJECTION, SOLUTION INTRAVENOUS CONTINUOUS
Status: DISCONTINUED | OUTPATIENT
Start: 2024-02-27 | End: 2024-02-27 | Stop reason: HOSPADM

## 2024-02-27 RX ORDER — MEPERIDINE HYDROCHLORIDE 25 MG/ML
12.5 INJECTION INTRAMUSCULAR; INTRAVENOUS; SUBCUTANEOUS EVERY 10 MIN PRN
Status: CANCELLED | OUTPATIENT
Start: 2024-02-27

## 2024-02-27 RX ORDER — ONDANSETRON HYDROCHLORIDE 2 MG/ML
4 INJECTION, SOLUTION INTRAVENOUS ONCE AS NEEDED
Status: CANCELLED | OUTPATIENT
Start: 2024-02-27

## 2024-02-27 RX ORDER — FENTANYL CITRATE 50 UG/ML
25 INJECTION, SOLUTION INTRAMUSCULAR; INTRAVENOUS EVERY 5 MIN PRN
Status: CANCELLED | OUTPATIENT
Start: 2024-02-27

## 2024-02-27 RX ORDER — PROPOFOL 10 MG/ML
INJECTION, EMULSION INTRAVENOUS AS NEEDED
Status: DISCONTINUED | OUTPATIENT
Start: 2024-02-27 | End: 2024-02-27

## 2024-02-27 RX ORDER — FENTANYL CITRATE 50 UG/ML
50 INJECTION, SOLUTION INTRAMUSCULAR; INTRAVENOUS EVERY 5 MIN PRN
Status: CANCELLED | OUTPATIENT
Start: 2024-02-27

## 2024-02-27 RX ORDER — FENTANYL CITRATE 50 UG/ML
INJECTION, SOLUTION INTRAMUSCULAR; INTRAVENOUS AS NEEDED
Status: DISCONTINUED | OUTPATIENT
Start: 2024-02-27 | End: 2024-02-27

## 2024-02-27 RX ORDER — LIDOCAINE HYDROCHLORIDE 10 MG/ML
0.1 INJECTION INFILTRATION; PERINEURAL ONCE
Status: CANCELLED | OUTPATIENT
Start: 2024-02-27 | End: 2024-02-27

## 2024-02-27 RX ADMIN — FENTANYL CITRATE 25 MCG: 50 INJECTION INTRAMUSCULAR; INTRAVENOUS at 15:00

## 2024-02-27 RX ADMIN — CEFAZOLIN SODIUM 2 G: 2 INJECTION, SOLUTION INTRAVENOUS at 14:37

## 2024-02-27 RX ADMIN — SODIUM CHLORIDE, POTASSIUM CHLORIDE, SODIUM LACTATE AND CALCIUM CHLORIDE: 600; 310; 30; 20 INJECTION, SOLUTION INTRAVENOUS at 14:37

## 2024-02-27 RX ADMIN — PROPOFOL 150 MG: 10 INJECTION, EMULSION INTRAVENOUS at 14:41

## 2024-02-27 RX ADMIN — FENTANYL CITRATE 25 MCG: 50 INJECTION INTRAMUSCULAR; INTRAVENOUS at 15:06

## 2024-02-27 RX ADMIN — SODIUM CHLORIDE 100 ML/HR: 900 INJECTION, SOLUTION INTRAVENOUS at 12:55

## 2024-02-27 RX ADMIN — FENTANYL CITRATE 25 MCG: 50 INJECTION INTRAMUSCULAR; INTRAVENOUS at 14:41

## 2024-02-27 RX ADMIN — ONDANSETRON 4 MG: 2 INJECTION, SOLUTION INTRAMUSCULAR; INTRAVENOUS at 14:46

## 2024-02-27 RX ADMIN — LIDOCAINE HYDROCHLORIDE 30 MG: 10 INJECTION, SOLUTION INTRAVENOUS at 14:41

## 2024-02-27 RX ADMIN — PROPOFOL 50 MG: 10 INJECTION, EMULSION INTRAVENOUS at 15:00

## 2024-02-27 RX ADMIN — FENTANYL CITRATE 25 MCG: 50 INJECTION INTRAMUSCULAR; INTRAVENOUS at 14:50

## 2024-02-27 ASSESSMENT — PAIN SCALES - GENERAL
PAINLEVEL_OUTOF10: 0 - NO PAIN

## 2024-02-27 ASSESSMENT — PAIN - FUNCTIONAL ASSESSMENT
PAIN_FUNCTIONAL_ASSESSMENT: 0-10

## 2024-02-27 ASSESSMENT — COLUMBIA-SUICIDE SEVERITY RATING SCALE - C-SSRS
1. IN THE PAST MONTH, HAVE YOU WISHED YOU WERE DEAD OR WISHED YOU COULD GO TO SLEEP AND NOT WAKE UP?: NO
6. HAVE YOU EVER DONE ANYTHING, STARTED TO DO ANYTHING, OR PREPARED TO DO ANYTHING TO END YOUR LIFE?: NO
2. HAVE YOU ACTUALLY HAD ANY THOUGHTS OF KILLING YOURSELF?: NO

## 2024-02-27 NOTE — ANESTHESIA POSTPROCEDURE EVALUATION
Patient: Nina Oquendo    Procedure Summary       Date: 02/27/24 Room / Location: [facility] / [facility]    Anesthesia Start: 1437 Anesthesia Stop: 1522    Procedure: Cystoscopy with Retrograde Pyelogram, right diagnostic ureteroscopy (Right: Bladder) Diagnosis:       Urothelial cancer (CMS/HCC)      (Urothelial cancer (CMS/HCC) [C68.9])    Surgeons: Bjorn Ling MD MPH Responsible Provider: Michoacano Layton MD    Anesthesia Type: general ASA Status: 3            Anesthesia Type: general    Vitals Value Taken Time   /62 02/27/24 1545   Temp 36.3 °C (97.3 °F) 02/27/24 1545   Pulse 61 02/27/24 1545   Resp 14 02/27/24 1545   SpO2 100 % 02/27/24 1545       Anesthesia Post Evaluation    Patient location during evaluation: PACU  Patient participation: complete - patient participated  Level of consciousness: awake  Pain management: adequate  Airway patency: patent  Cardiovascular status: acceptable  Respiratory status: acceptable  Hydration status: acceptable  Postoperative Nausea and Vomiting: none        No notable events documented.

## 2024-02-27 NOTE — ANESTHESIA PREPROCEDURE EVALUATION
Patient: Nina Oquendo    Procedure Information       Date/Time: 02/27/24 1400    Procedure: Cystoscopy with Retrograde Pyelogram, right diagnostic ureteroscopy (Right: Bladder)    Location: [facility] / [facility]    Surgeons: Bjorn Ling MD MPH            Relevant Problems   Cardiovascular   (+) Benign essential hypertension   (+) Essential hypertension   (+) Hyperlipidemia      Endocrine   (+) Type 2 diabetes mellitus (CMS/HCC)      GI   (+) Gastroesophageal reflux disease without esophagitis      Neuro/Psych   (+) Anxiety disorder, unspecified   (+) Depressive disorder      Pulmonary   (+) Chronic obstructive lung disease (CMS/HCC)       Clinical information reviewed:   Tobacco  Allergies  Meds   Med Hx  Surg Hx   Fam Hx  Soc Hx        NPO Detail:  NPO/Void Status  Carbohydrate Drink Given Prior to Surgery? : N  Date of Last Liquid: 02/26/24  Time of Last Liquid: 1600  Date of Last Solid: 02/26/24  Time of Last Solid: 0000  Last Intake Type: Clear fluids  Time of Last Void: 1100         Physical Exam    Airway  Mallampati: II  TM distance: >3 FB  Neck ROM: full     Cardiovascular    Dental    Pulmonary    Abdominal            Anesthesia Plan    History of general anesthesia?: yes  History of complications of general anesthesia?: no    ASA 3     general     intravenous induction   Anesthetic plan and risks discussed with patient.    Plan discussed with attending and CAA.

## 2024-02-27 NOTE — DISCHARGE INSTRUCTIONS
DEPARTMENT OF UROLOGY  DISCHARGE INSTRUCTIONS CYSTOSCOPY  Outpatient Surgery    C O N F I D E N T I A L   I N F O R M A T I O N    Nina Oquendo      Call [phone] during regular daytime business hours (8:00 am - 5:00 pm) and after 5:00 pm ask for the Urology resident with any questions or concerns.      If it is a life-threatening situation, proceed to the nearest emergency department.        Follow-up appointment:    Future Appointments   Date Time Provider Department [place]   3/18/2024 10:20 AM Bjorn Ling MD MPH [id number] [facility]   4/23/2024 11:10 AM Christopher Sam MD [id number] [facility]   5/10/2024  8:00 AM Kyle Garber MD [id number] [facility]   9/9/2024  9:30 AM Itz Bella MD [id number] [facility]          Thank you for the opportunity to care for you today.  Your health and healing are very important to us.  We hope we made you feel as comfortable as possible and are committed to your recovery and continued well-being.      The following is a brief overview of your cystoscopy procedure today. Some of the information contained on this summary may be confidential.  This information should be kept in your records and should be shared with your regular doctor.    Physicians:   Dr. Ling      Procedure performed: cystoscopy (looked inside your bladder), ureteroscopy (looked inside your ureter and kidney  Pending results:  none     What to Expect During your Recovery and Home Care  Anesthesia Side Effects   You received anesthesia today.  You may feel sleepy, tired, or have a sore throat.   You may also feel drowsiness, dizziness, or inability to think clearly.  For your safety, do not drive, drink alcoholic beverages, take any unprescribed medication or make any important decisions for 24 hours.  A responsible adult should be with you for 24 hours.        Activity and Recovery    No heavy lifting today. Rest for the next 24 hours.       Pain Control  Unfortunately, you may experience  pain after your procedure.  Frequency and urgency to urinate and mild discomfort are expected. Adequate pain management can include alternative measures to help ease your pain and that can include over the counter Tylenol or ibuprofen which can be taken as prescribed as needed for breakthrough pain. Do not take more than 4,000mg of Tylenol in a 24-hour period.        Nausea/Vomiting   Clear liquids are best tolerated at first. Start slow, advance your diet as tolerated to normal foods. Avoid spicy, greasy, heavy foods at first. Also, you may feel nauseous or like you need to vomit if you take any type of medication on an empty stomach.  Call your physician if you are unable to eat or drink and have persistent vomiting.    Signs of Bleeding   You are going to have some blood in your urine. Your urine will be light pink to yellow. If urine becomes thick dark georgie red, has large clots or you are unable to urinate, please notify your physician.    Treatment/wound care:   Keep area(s) clean and dry.   It is okay to shower 24 hours after time of surgery.      Signs of Infection  Signs of infection can include fever, drainage(green/yellow), chills, burning sensation with passing of urine, or severe abdominal pain.  If you see any of these occur, please contact your doctor's office at [phone].  Any fever higher than 100.4, especially if associated with an ill feeling, abdominal pain, chills, or nausea should be reported to your surgeon.        Assist in bowel movements/urination  Increase fiber in diet  Increase water (6 to 8 glasses)  Increase walking   Urination should occur within 6 hours of anesthesia  If you have tried these methods and your bladder still feels full and you cannot use the bathroom, please go to your nearest Emergency room/contact your physician.    Additional Instructions:   Increase water intake for the next 24 hours to help flush out our urinary system.

## 2024-02-27 NOTE — OP NOTE
Cystoscopy with Retrograde Pyelogram, right diagnostic ureteroscopy (R) Operative Note     Date: 2024  OR Location: [facility]    Name: Nina Oquendo, : 1950, Age: 73 y.o., MRN: [id number], Sex: female    Diagnosis  Pre-op Diagnosis     * Urothelial cancer (CMS/HCC) [C68.9] Post-op Diagnosis     * Urothelial cancer (CMS/HCC) [C68.9]     Procedures  Cystoscopy with Retrograde Pyelogram, right diagnostic ureteroscopy  02329 - DC CYSTO BLADDER W/URETERAL CATHETERIZATION    DC CYSTO W/URTROSCOPY&/PYELOSCOPY DX [14735]  Surgeons      * Bjorn Ling - Primary     * Fredrick Shipman    Resident/Fellow/Other Assistant:  Surgeon(s) and Role:     * Fredrick Shipman MD    Procedure Summary  Anesthesia: * No anesthesia type entered *  ASA: III  Anesthesia Staff: Anesthesiologist: Michoacano Layton MD  C-AA: LYNNETTE Hartman  Estimated Blood Loss: 1mL  Intra-op Medications:   Administrations occurring from 1400 to 1500 on 24:   Medication Name Total Dose   iohexol (OMNIPaque) 240 mg iodine/mL solution 15 mL   sodium chloride 0.9% infusion Cannot be calculated   ceFAZolin in dextrose (iso-os) (Ancef) IVPB 2 g 2 g              Anesthesia Record               Intraprocedure I/O Totals          Intake    LR infusion 500.00 mL    sodium chloride 0.9% infusion 1.67 mL    ceFAZolin in dextrose (iso-os) (Ancef) IVPB 2 g 100.00 mL    Total Intake 601.67 mL       Output    Est. Blood Loss 0 mL    Total Output 0 mL       Net    Net Volume 601.67 mL          Specimen:   ID Type Source Tests Collected by Time   1 : RIGHT URETERAL WASHINGS Non-Gynecologic Cytology URETER WASH RIGHT CYTOLOGY CONSULTATION (NON-GYNECOLOGIC) Bjorn Ling MD MPH 2024 0141        Staff:   Circulator: Crystal White RN  Relief Circulator: Noemi Harper RN  Relief Scrub: Maude Yusuf RN         Drains and/or Catheters: * None in log *    Tourniquet Times:         Implants:     Findings: cicatrix at mid ureter above pelvic  brim - able to pass wire and advance scope over wire through narrowing  -No tumors  - Excellent drainage of right ureter    Indications: Nina Oquendo is an 73 y.o. female who is having surgery for Urothelial cancer (CMS/[facility]) [C68.9].     The patient was seen in the preoperative area. The risks, benefits, complications, treatment options, non-operative alternatives, expected recovery and outcomes were discussed with the patient. The possibilities of reaction to medication, pulmonary aspiration, injury to surrounding structures, bleeding, recurrent infection, the need for additional procedures, failure to diagnose a condition, and creating a complication requiring transfusion or operation were discussed with the patient. The patient concurred with the proposed plan, giving informed consent.  The site of surgery was properly noted/marked if necessary per policy. The patient has been actively warmed in preoperative area. Preoperative antibiotics have been ordered and given within 1 hours of incision. Venous thrombosis prophylaxis have been ordered including bilateral sequential compression devices    Procedure Details:   Patient consented to procedure in preoperative area. Risks and benefits discussed. Patient was marked on the right side. Allergies were reviewed and preoperative antibiotics were administered. Patient was brought to the operating room and placed in supine position on the operating room table. A timeout was performed. All were in agreement. Patient underwent general anesthesia without complication. They were repositioned in dorsal lithotomy and prepped and draped in the usual sterile fashion. A 21 fr rigid cystoscope was used to perform cystourethroscopy. Urethra was normal.  UOs were in normal orthotopic position. No bladder masses or stones were identified.  Dual lumen placed over a wire into the right UO. Right ureteral washings were obtained for cytology. Retrograde pyelogram was performed.      Right retrograde pyelogram interpretation: right ureter with normal caliber and course. No filling defects    A second wire was advanced to the kidney through the dual lumen catheter. Then the dual lumen was removed. One wire was secured as a safety wire. The flexible ureteroscope was advanced over the wire into the right ureter. There was a narrowing at the level of the mid ureter above the level of the pelvic brim. The kidney was draining well however. We were able to advance the wire through the ureteroscope passed this cicatrix and advance the scope along the wire into the proximal ureter. We then reached the kidney and performed pan-pyeloscopy which revealed no tumors. We then repeated ureteroscopy as we backed out the ureteroscope. No ureteral tumors were noted. Instruments were removed. This concluded the procedure. Patient was awoken from anesthesia without complication and transferred to PACU in stable condition.     Complications:  None; patient tolerated the procedure well.    Disposition: PACU - hemodynamically stable.  Condition: stable         Additional Details: n/a    Attending Attestation:     Bjorn Ling  Phone Number: [phone]

## 2024-02-27 NOTE — ANESTHESIA PROCEDURE NOTES
Airway  Date/Time: 2/27/2024 2:42 PM  Urgency: elective    Airway not difficult    Staffing  Performed: LYNNETTE   Authorized by: Michoacano Layton MD    Performed by: LYNNETTE Hartman  Patient location during procedure: OR    Indications and Patient Condition  Indications for airway management: anesthesia  Spontaneous Ventilation: absent  Sedation level: deep  Preoxygenated: yes  Patient position: sniffing  Mask difficulty assessment: 0 - not attempted    Final Airway Details  Final airway type: supraglottic airway      Successful airway: Size 3     Number of attempts at approach: 1    Additional Comments  EASY, ATRAUMATIC LMA PLACEMENT TO GOOD SEAL  SOFT BITE BLOCK PLACED

## 2024-02-28 ASSESSMENT — PAIN SCALES - GENERAL: PAINLEVEL_OUTOF10: 0 - NO PAIN

## 2024-03-15 LAB
LABORATORY COMMENT REPORT: NORMAL
LABORATORY COMMENT REPORT: NORMAL
PATH REPORT.FINAL DX SPEC: NORMAL
PATH REPORT.GROSS SPEC: NORMAL
PATH REPORT.RELEVANT HX SPEC: NORMAL
PATH REPORT.TOTAL CANCER: NORMAL

## 2024-03-18 ENCOUNTER — APPOINTMENT (OUTPATIENT)
Dept: UROLOGY | Facility: CLINIC | Age: 74
End: 2024-03-18
Payer: MEDICARE

## 2024-03-18 ENCOUNTER — TELEMEDICINE (OUTPATIENT)
Dept: UROLOGY | Facility: CLINIC | Age: 74
End: 2024-03-18
Payer: MEDICARE

## 2024-03-18 DIAGNOSIS — C68.9 UROTHELIAL CANCER (MULTI): Primary | ICD-10-CM

## 2024-03-18 DIAGNOSIS — C67.9 MALIGNANT NEOPLASM OF URINARY BLADDER, UNSPECIFIED SITE (MULTI): ICD-10-CM

## 2024-03-18 PROCEDURE — 1159F MED LIST DOCD IN RCRD: CPT | Performed by: STUDENT IN AN ORGANIZED HEALTH CARE EDUCATION/TRAINING PROGRAM

## 2024-03-18 PROCEDURE — 99442 PR PHYS/QHP TELEPHONE EVALUATION 11-20 MIN: CPT | Performed by: STUDENT IN AN ORGANIZED HEALTH CARE EDUCATION/TRAINING PROGRAM

## 2024-03-18 PROCEDURE — 4010F ACE/ARB THERAPY RXD/TAKEN: CPT | Performed by: STUDENT IN AN ORGANIZED HEALTH CARE EDUCATION/TRAINING PROGRAM

## 2024-03-18 PROCEDURE — 1160F RVW MEDS BY RX/DR IN RCRD: CPT | Performed by: STUDENT IN AN ORGANIZED HEALTH CARE EDUCATION/TRAINING PROGRAM

## 2024-03-18 PROCEDURE — 3052F HG A1C>EQUAL 8.0%<EQUAL 9.0%: CPT | Performed by: STUDENT IN AN ORGANIZED HEALTH CARE EDUCATION/TRAINING PROGRAM

## 2024-03-18 NOTE — PROGRESS NOTES
Subjective     Nina Oquendo is a 73 y.o. female 1 year s/p R sided proximal urethrectomy, ureteroureterostomy and adrenalectomy for bilateral adrenal tumors and LG upper tract urothelial carcinoma here for 6 month FUV.     She is doing well.     CT urography 12/2023 was negative. Endoscopy done for surveillance of R upper urinary tract was negative for recurrence of disease locally. Cytology was negative.      Her upper tract bx was LG pTa but incompletely removed due to limitations with UTC and size of primary lesion.     Past medical, surgical, family and social history were reviewed and unchanged since last visit besides what is in the HPI.            Review of Systems   All other systems reviewed and are negative.      Objective   Physical Exam  Gen: No acute distress     Psych: Alert and oriented x3     Resp: Nonlabored respirations       Assessment/Plan   Problem List Items Addressed This Visit             ICD-10-CM    Urothelial cancer (CMS/HCC) - Primary C68.9     I reviewed her imaging and blood work in surveillance and we discussed this in detail. CT urography 12/2023 was negative. Cytology was negative.      Per AUA guidelines in setting of kidney-sparing disease, we will continue on surveillance with repeat imaging and cysto in 6 months. The patient understands and agrees with this plan.                     Other Visit Diagnoses         Codes    Malignant neoplasm of urinary bladder, unspecified site (CMS/HCC)     C67.9                 Plan:  6 month FUV with CT urogram and cystoscopy          Scribe Attestation  By signing my name below, I, Katelyn Casalla, [place]   attest that this documentation has been prepared under the direction and in the presence of Bjorn Ling MD MPH.

## 2024-03-19 DIAGNOSIS — C68.9 UROTHELIAL CANCER (MULTI): Primary | ICD-10-CM

## 2024-04-09 ENCOUNTER — TELEPHONE (OUTPATIENT)
Dept: PRIMARY CARE | Facility: CLINIC | Age: 74
End: 2024-04-09
Payer: MEDICARE

## 2024-04-15 ENCOUNTER — LAB (OUTPATIENT)
Dept: LAB | Facility: LAB | Age: 74
End: 2024-04-15
Payer: MEDICARE

## 2024-04-15 DIAGNOSIS — E11.9 TYPE 2 DIABETES MELLITUS WITHOUT COMPLICATION, WITH LONG-TERM CURRENT USE OF INSULIN (MULTI): ICD-10-CM

## 2024-04-15 DIAGNOSIS — Z79.4 TYPE 2 DIABETES MELLITUS WITHOUT COMPLICATION, WITH LONG-TERM CURRENT USE OF INSULIN (MULTI): ICD-10-CM

## 2024-04-15 LAB
ALBUMIN SERPL BCP-MCNC: 4.1 G/DL (ref 3.4–5)
ALP SERPL-CCNC: 98 U/L (ref 33–136)
ALT SERPL W P-5'-P-CCNC: 16 U/L (ref 7–45)
ANION GAP SERPL CALC-SCNC: 10 MMOL/L (ref 10–20)
AST SERPL W P-5'-P-CCNC: 18 U/L (ref 9–39)
BILIRUB SERPL-MCNC: 0.6 MG/DL (ref 0–1.2)
BUN SERPL-MCNC: 15 MG/DL (ref 6–23)
CALCIUM SERPL-MCNC: 9.1 MG/DL (ref 8.6–10.3)
CHLORIDE SERPL-SCNC: 105 MMOL/L (ref 98–107)
CHOLEST SERPL-MCNC: 146 MG/DL (ref 0–199)
CHOLESTEROL/HDL RATIO: 2
CO2 SERPL-SCNC: 31 MMOL/L (ref 21–32)
CREAT SERPL-MCNC: 0.7 MG/DL (ref 0.5–1.05)
EGFRCR SERPLBLD CKD-EPI 2021: >90 ML/MIN/1.73M*2
EST. AVERAGE GLUCOSE BLD GHB EST-MCNC: 192 MG/DL
GLUCOSE SERPL-MCNC: 165 MG/DL (ref 74–99)
HBA1C MFR BLD: 8.3 %
HDLC SERPL-MCNC: 72.5 MG/DL
LDLC SERPL CALC-MCNC: 59 MG/DL
NON HDL CHOLESTEROL: 74 MG/DL (ref 0–149)
POTASSIUM SERPL-SCNC: 5.2 MMOL/L (ref 3.5–5.3)
PROT SERPL-MCNC: 6.5 G/DL (ref 6.4–8.2)
SODIUM SERPL-SCNC: 141 MMOL/L (ref 136–145)
TRIGL SERPL-MCNC: 74 MG/DL (ref 0–149)
VLDL: 15 MG/DL (ref 0–40)

## 2024-04-15 PROCEDURE — 80061 LIPID PANEL: CPT

## 2024-04-15 PROCEDURE — 80053 COMPREHEN METABOLIC PANEL: CPT

## 2024-04-15 PROCEDURE — 83036 HEMOGLOBIN GLYCOSYLATED A1C: CPT

## 2024-04-15 PROCEDURE — 36415 COLL VENOUS BLD VENIPUNCTURE: CPT

## 2024-04-17 DIAGNOSIS — Z12.31 ENCOUNTER FOR SCREENING MAMMOGRAM FOR BREAST CANCER: ICD-10-CM

## 2024-04-23 ENCOUNTER — OFFICE VISIT (OUTPATIENT)
Dept: PRIMARY CARE | Facility: CLINIC | Age: 74
End: 2024-04-23
Payer: MEDICARE

## 2024-04-23 VITALS
TEMPERATURE: 96.5 F | DIASTOLIC BLOOD PRESSURE: 72 MMHG | HEART RATE: 70 BPM | OXYGEN SATURATION: 93 % | SYSTOLIC BLOOD PRESSURE: 122 MMHG | BODY MASS INDEX: 21.97 KG/M2 | WEIGHT: 130 LBS

## 2024-04-23 DIAGNOSIS — Z79.4 TYPE 2 DIABETES MELLITUS WITHOUT COMPLICATION, WITH LONG-TERM CURRENT USE OF INSULIN (MULTI): Primary | ICD-10-CM

## 2024-04-23 DIAGNOSIS — E78.2 MIXED HYPERLIPIDEMIA: ICD-10-CM

## 2024-04-23 DIAGNOSIS — J44.9 CHRONIC OBSTRUCTIVE PULMONARY DISEASE, UNSPECIFIED COPD TYPE (MULTI): ICD-10-CM

## 2024-04-23 DIAGNOSIS — E11.9 TYPE 2 DIABETES MELLITUS WITHOUT COMPLICATION, WITH LONG-TERM CURRENT USE OF INSULIN (MULTI): Primary | ICD-10-CM

## 2024-04-23 DIAGNOSIS — I10 ESSENTIAL HYPERTENSION: ICD-10-CM

## 2024-04-23 DIAGNOSIS — C68.9 UROTHELIAL CANCER (MULTI): ICD-10-CM

## 2024-04-23 DIAGNOSIS — I10 BENIGN ESSENTIAL HYPERTENSION: ICD-10-CM

## 2024-04-23 PROCEDURE — 1159F MED LIST DOCD IN RCRD: CPT | Performed by: FAMILY MEDICINE

## 2024-04-23 PROCEDURE — 1160F RVW MEDS BY RX/DR IN RCRD: CPT | Performed by: FAMILY MEDICINE

## 2024-04-23 PROCEDURE — 3078F DIAST BP <80 MM HG: CPT | Performed by: FAMILY MEDICINE

## 2024-04-23 PROCEDURE — 3052F HG A1C>EQUAL 8.0%<EQUAL 9.0%: CPT | Performed by: FAMILY MEDICINE

## 2024-04-23 PROCEDURE — 4010F ACE/ARB THERAPY RXD/TAKEN: CPT | Performed by: FAMILY MEDICINE

## 2024-04-23 PROCEDURE — 99214 OFFICE O/P EST MOD 30 MIN: CPT | Performed by: FAMILY MEDICINE

## 2024-04-23 PROCEDURE — 3074F SYST BP LT 130 MM HG: CPT | Performed by: FAMILY MEDICINE

## 2024-04-23 PROCEDURE — 3048F LDL-C <100 MG/DL: CPT | Performed by: FAMILY MEDICINE

## 2024-04-23 ASSESSMENT — ENCOUNTER SYMPTOMS: HYPERTENSION: 1

## 2024-04-23 NOTE — PROGRESS NOTES
Subjective   Patient ID: Nina Oquendo is a 73 y.o. female who presents for Diabetes, Hyperlipidemia, and Hypertension (Recheck/Review bw).  Hypertension    Diabetes    Hyperlipidemia      1. DM: a1c was 8.3.  Taking a small amount of insulin.    2. Lipids: denies chest pain. taking medication.     3. Tobacco use: still smoking    4. HTN: well-controlled with medication .does not measure at home.     5. Adrenal carcinoma: now cancer free    6. Anxiety: helped by the duloxetine. Stable    Patient Active Problem List   Diagnosis    Type 2 diabetes mellitus (Multi)    Hyperlipidemia    Chronic obstructive lung disease (Multi)    Benign essential hypertension    Essential hypertension    Depressive disorder    Protein-calorie malnutrition, unspecified severity (Multi)    Anxiety disorder, unspecified    Gastroesophageal reflux disease without esophagitis    Urothelial cancer (Multi)       Social Connections: Not on file       Current Outpatient Medications on File Prior to Visit   Medication Sig Dispense Refill    atorvastatin (Lipitor) 40 mg tablet Take 1 tablet (40 mg) by mouth once daily. 90 tablet 3    blood sugar diagnostic (Prodigy No Coding) strip Prodigy Pressure Use to check 2x a day 200 strip 0    blood-glucose sensor (FreeStyle Mukund 3 Sensor) device Apply under skin [id number] 6 each 3    DULoxetine (Cymbalta) 30 mg DR capsule Take 1 capsule (30 mg) by mouth once daily. Do not crush or chew. 90 capsule 1    FreeStyle lancets 28 gauge 1 each 2 times a day. Prodigy Pressure activated safety lancets 28G To check 2x a day, 3 mo supply 200 each 0    insulin lispro (HumaLOG) 100 unit/mL injection Inject 5 Units under the skin 3 times a day with meals. Take as directed per insulin instructions 13.5 mL 3    losartan (Cozaar) 50 mg tablet Take 1 tablet (50 mg) by mouth once daily. 90 tablet 3    metoprolol succinate XL (Toprol-XL) 50 mg 24 hr tablet Take 1 tablet (50 mg) by mouth once daily. 90 tablet 3    omeprazole  "(PriLOSEC) 20 mg DR capsule Take 1 capsule (20 mg) by mouth early in the morning..      omeprazole OTC (PriLOSEC OTC) 20 mg EC tablet Take 1 tablet (20 mg) by mouth once daily in the morning. Take before meals. (Patient not taking: Reported on 2/27/2024) 90 tablet 3    ondansetron (Zofran) 4 mg tablet Take 1 tablet (4 mg) by mouth every 8 hours if needed for nausea.      pen needle, diabetic 31 gauge x 5/16\" needle 1 each 3 times a day. 300 each 3     No current facility-administered medications on file prior to visit.        Vitals:    04/23/24 1101   BP: 122/72   Pulse: 70   Temp: 35.8 °C (96.5 °F)   SpO2: 93%     Vitals:    04/23/24 1101   Weight: 59 kg (130 lb)       Review of Systems   All other systems reviewed and are negative.      Objective     Physical Exam  Constitutional:       Appearance: Normal appearance. She is well-developed.   HENT:      Head: Atraumatic.   Cardiovascular:      Rate and Rhythm: Normal rate and regular rhythm.      Heart sounds: Normal heart sounds. No murmur heard.  Pulmonary:      Effort: Pulmonary effort is normal.      Breath sounds: Normal breath sounds.   Abdominal:      General: Bowel sounds are normal.      Palpations: Abdomen is soft.   Skin:     General: Skin is warm.   Neurological:      General: No focal deficit present.      Mental Status: She is alert.   Psychiatric:         Mood and Affect: Mood normal.         Lab on 04/15/2024   Component Date Value Ref Range Status    Glucose 04/15/2024 165 (H)  74 - 99 mg/dL Final    Sodium 04/15/2024 141  136 - 145 mmol/L Final    Potassium 04/15/2024 5.2  3.5 - 5.3 mmol/L Final    Chloride 04/15/2024 105  98 - 107 mmol/L Final    Bicarbonate 04/15/2024 31  21 - 32 mmol/L Final    Anion Gap 04/15/2024 10  10 - 20 mmol/L Final    Urea Nitrogen 04/15/2024 15  6 - 23 mg/dL Final    Creatinine 04/15/2024 0.70  0.50 - 1.05 mg/dL Final    eGFR 04/15/2024 >90  >60 mL/min/1.73m*2 Final    Calculations of estimated GFR are performed using " the 2021 CKD-EPI Study Refit equation without the race variable for the IDMS-Traceable creatinine methods.  https://jasn.asnjournals.org/content/early/2021/09/22/ASN.0893490897    Calcium 04/15/2024 9.1  8.6 - 10.3 mg/dL Final    Albumin 04/15/2024 4.1  3.4 - 5.0 g/dL Final    Alkaline Phosphatase 04/15/2024 98  33 - 136 U/L Final    Total Protein 04/15/2024 6.5  6.4 - 8.2 g/dL Final    AST 04/15/2024 18  9 - 39 U/L Final    Bilirubin, Total 04/15/2024 0.6  0.0 - 1.2 mg/dL Final    ALT 04/15/2024 16  7 - 45 U/L Final    Patients treated with Sulfasalazine may generate falsely decreased results for ALT.    Cholesterol 04/15/2024 146  0 - 199 mg/dL Final          Age      Desirable   Borderline High   High     0-19 Y     0 - 169       170 - 199     >/= 200    20-24 Y     0 - 189       190 - 224     >/= 225         >24 Y     0 - 199       200 - 239     >/= 240   **All ranges are based on fasting samples. Specific   therapeutic targets will vary based on patient-specific   cardiac risk.    Pediatric guidelines reference:Pediatrics 2011, 128(S5).Adult guidelines reference: NCEP ATPIII Guidelines,MORIAH 2001, 258:2486-97    Venipuncture immediately after or during the administration of Metamizole may lead to falsely low results. Testing should be performed immediately prior to Metamizole dosing.    HDL-Cholesterol 04/15/2024 72.5  mg/dL Final      Age       Very Low   Low     Normal    High    0-19 Y    < 35      < 40     40-45     ----  20-24 Y    ----     < 40      >45      ----        >24 Y      ----     < 40     40-60      >60      Cholesterol/HDL Ratio 04/15/2024 2.0   Final      Ref Values  Desirable  < 3.4  High Risk  > 5.0    LDL Calculated 04/15/2024 59  <=99 mg/dL Final                                Near   Borderline      AGE      Desirable  Optimal    High     High     Very High     0-19 Y     0 - 109     ---    110-129   >/= 130     ----    20-24 Y     0 - 119     ---    120-159   >/= 160     ----      >24 Y      0 -  99   100-129  130-159   160-189     >/=190      VLDL 04/15/2024 15  0 - 40 mg/dL Final    Triglycerides 04/15/2024 74  0 - 149 mg/dL Final       Age         Desirable   Borderline High   High     Very High   0 D-90 D    19 - 174         ----         ----        ----  91 D- 9 Y     0 -  74        75 -  99     >/= 100      ----    10-19 Y     0 -  89        90 - 129     >/= 130      ----    20-24 Y     0 - 114       115 - 149     >/= 150      ----         >24 Y     0 - 149       150 - 199    200- 499    >/= 500    Venipuncture immediately after or during the administration of Metamizole may lead to falsely low results. Testing should be performed immediately prior to Metamizole dosing.    Non HDL Cholesterol 04/15/2024 74  0 - 149 mg/dL Final          Age       Desirable   Borderline High   High     Very High     0-19 Y     0 - 119       120 - 144     >/= 145    >/= 160    20-24 Y     0 - 149       150 - 189     >/= 190      ----         >24 Y    30 mg/dL above LDL Cholesterol goal      Hemoglobin A1C 04/15/2024 8.3 (H)  see below % Final    Estimated Average Glucose 04/15/2024 192  Not Established mg/dL Final   Admission on 02/27/2024, Discharged on 02/27/2024   Component Date Value Ref Range Status    Case Report 02/27/2024    Final                    Value:Non-gynecologic Cytology                          Case: [id number]                                   Authorizing Provider:  Bjorn Ling MD MPH     Collected:           02/27/2024 1458              Ordering Location:     [facility]       Received:            02/27/2024 [facility]                                     Center OR                                                                    Pathologist:           Elena Alcazar MD                                                          Specimen:    URETER WASH RIGHT, RIGHT URETERAL WASHINGS                                                 Final Cytological Interpretation 02/27/2024    Final                     Value:This result contains rich text formatting which cannot be displayed here.      02/27/2024    Final                    Value:This result contains rich text formatting which cannot be displayed here.    Clinical History 02/27/2024    Final                    Value:This result contains rich text formatting which cannot be displayed here.    Specimen Description 02/27/2024    Final                    Value:This result contains rich text formatting which cannot be displayed here.    Specimen Processing Detail 02/27/2024    Final                    Value:This result contains rich text formatting which cannot be displayed here.       Assessment/Plan   Problem List Items Addressed This Visit             ICD-10-CM    Type 2 diabetes mellitus (Multi) - Primary E11.9    Hyperlipidemia E78.5    Chronic obstructive lung disease (Multi) J44.9    Benign essential hypertension I10    Essential hypertension I10    Urothelial cancer (Multi) C68.9   Patient is doing fair.  Inc mealtime insulin.  Refilled pts meds.  Follow up in 3 mo.  Starting Mukund CGM.   Instructed on mealtime insulin.  Encouraged to quit smoking.

## 2024-05-10 ENCOUNTER — HOSPITAL ENCOUNTER (OUTPATIENT)
Dept: RADIOLOGY | Facility: EXTERNAL LOCATION | Age: 74
Discharge: HOME | End: 2024-05-10

## 2024-05-10 ENCOUNTER — OFFICE VISIT (OUTPATIENT)
Dept: ORTHOPEDIC SURGERY | Facility: CLINIC | Age: 74
End: 2024-05-10
Payer: MEDICARE

## 2024-05-10 VITALS — HEIGHT: 65 IN | BODY MASS INDEX: 21.66 KG/M2 | WEIGHT: 130 LBS

## 2024-05-10 DIAGNOSIS — M16.11 PRIMARY LOCALIZED OSTEOARTHRITIS OF RIGHT HIP: Primary | ICD-10-CM

## 2024-05-10 PROCEDURE — 20611 DRAIN/INJ JOINT/BURSA W/US: CPT | Performed by: EMERGENCY MEDICINE

## 2024-05-10 PROCEDURE — 4010F ACE/ARB THERAPY RXD/TAKEN: CPT | Performed by: EMERGENCY MEDICINE

## 2024-05-10 PROCEDURE — 1160F RVW MEDS BY RX/DR IN RCRD: CPT | Performed by: EMERGENCY MEDICINE

## 2024-05-10 PROCEDURE — 3052F HG A1C>EQUAL 8.0%<EQUAL 9.0%: CPT | Performed by: EMERGENCY MEDICINE

## 2024-05-10 PROCEDURE — 99214 OFFICE O/P EST MOD 30 MIN: CPT | Performed by: EMERGENCY MEDICINE

## 2024-05-10 PROCEDURE — 1159F MED LIST DOCD IN RCRD: CPT | Performed by: EMERGENCY MEDICINE

## 2024-05-10 PROCEDURE — 3048F LDL-C <100 MG/DL: CPT | Performed by: EMERGENCY MEDICINE

## 2024-05-10 RX ORDER — LIDOCAINE HYDROCHLORIDE 10 MG/ML
4 INJECTION INFILTRATION; PERINEURAL
Status: COMPLETED | OUTPATIENT
Start: 2024-05-10 | End: 2024-05-10

## 2024-05-10 RX ORDER — METHYLPREDNISOLONE ACETATE 40 MG/ML
80 INJECTION, SUSPENSION INTRA-ARTICULAR; INTRALESIONAL; INTRAMUSCULAR; SOFT TISSUE
Status: COMPLETED | OUTPATIENT
Start: 2024-05-10 | End: 2024-05-10

## 2024-05-10 RX ADMIN — LIDOCAINE HYDROCHLORIDE 4 ML: 10 INJECTION INFILTRATION; PERINEURAL at 08:40

## 2024-05-10 RX ADMIN — METHYLPREDNISOLONE ACETATE 80 MG: 40 INJECTION, SUSPENSION INTRA-ARTICULAR; INTRALESIONAL; INTRAMUSCULAR; SOFT TISSUE at 08:40

## 2024-05-10 NOTE — PROGRESS NOTES
Subjective    Patient ID: Nina Oquendo is a 73 y.o. female.    Chief Complaint: Pain of the Right Hip (Hip injection 2/9/24)     Last Surgery: No surgery found  Last Surgery Date: No surgery found    Nina is a 73-year-old female referred to me by Dr. Sanchez for acute on chronic right hip pain.  She has had pain that has been very significant for the past 2 years and would like to avoid a hip replacement if at all possible.  She takes Tylenol with minimal relief.  She has fallen in the past but denies any specific known traumatic injuries.  Her pain is all in her groin and is moderate in severity.  It radiates slightly laterally but is mostly anterior.  She has no numbness or weakness.  No lower back pain. We agreed to perform a cortisone injection of her right femoral acetabular joint under ultrasound guidance which she tolerated very well without any complications.  Activity modifications were reviewed.  She will also begin using naproxen twice daily for her pain and can follow-up with me on an as-needed basis if she responds well to this injection.  Eventually she would likely require hip replacement but she would like to postpone that as long as possible.    Update on 2/9/2024.  Nina is coming back in for a follow-up visit.  She says that after her injection on 11/7/2023 she had about 2 months of really good pain relief.  For the past several weeks though her pain has returned and she is here requesting another injection.  She did take a fall couple of weeks ago while going into her house from her patio but did not injure her hip.  She has no other complaints or today. We agreed to perform a repeat cortisone injection of her right femoral acetabular joint under ultrasound guidance which she tolerated very well without any complications.  Activity modifications were reviewed.  She will also continue using naproxen twice daily for her pain and can follow-up with me on an as-needed basis if she responds well to  this injection again.  Eventually she will likely require a hip replacement but she would like to postpone that as long as possible.    Update on 5/10/2024.  Nina is coming back in for a follow-up visit.  She is again having acute on chronic right hip pain.  Her last injection was on 2/9/2024 and it provided her with very good relief until about 1 week prior to arrival.  She is taking a combination of Motrin and Tylenol and is interested in another injection.  Of note she brought me in 2 vinyl records.            Objective   Right Hip Exam     Tenderness   The patient is experiencing no tenderness.     Range of Motion   Abduction:  abnormal   Adduction:  abnormal   Extension:  abnormal   Flexion:  abnormal   External rotation:  abnormal   Internal rotation:  abnormal     Muscle Strength   Abduction: 4/5   Adduction: 4/5   Flexion: 4/5     Tests   ELA: positive    Other   Erythema: absent  Sensation: normal  Pulse: present    Comments:  Examination grossly unchanged      Left Hip Exam   Left hip exam is normal.          Image Results:  No new imaging today    Patient ID: Nina Oquendo is a 73 y.o. female.    L Inj/Asp: R hip joint on 5/10/2024 8:40 AM  Indications: pain  Details: 22 G needle, ultrasound-guided anterior approach  Medications: 80 mg methylPREDNISolone acetate 40 mg/mL; 4 mL lidocaine 10 mg/mL (1 %)  Outcome: tolerated well, no immediate complications  Procedure, treatment alternatives, risks and benefits explained, specific risks discussed. Consent was given by the patient. Immediately prior to procedure a time out was called to verify the correct patient, procedure, equipment, support staff and site/side marked as required. Patient was prepped and draped in the usual sterile fashion.           Assessment/Plan   Encounter Diagnoses:  Primary localized osteoarthritis of right hip    Orders Placed This Encounter    L Inj/Asp    Point of Care Ultrasound     No follow-ups on file.    We discussed her  treatment options and agreed to perform another right hip cortisone injection under ultrasound guidance.  The patient tolerated the procedure well without any complications.  Activity modifications were reviewed.  We had discussed her taking Tylenol 3 times daily at prescription dosing and trying to avoid NSAIDs if possible.  She will follow-up with me as needed moving forward.    ** Please excuse any errors in grammar or translation related to this dictation. Voice recognition software was utilized to prepare this document. **       Kyle Garber MD  [facility]

## 2024-05-13 ENCOUNTER — HOSPITAL ENCOUNTER (OUTPATIENT)
Dept: RADIOLOGY | Facility: HOSPITAL | Age: 74
Discharge: HOME | End: 2024-05-13
Payer: MEDICARE

## 2024-05-13 DIAGNOSIS — Z12.31 ENCOUNTER FOR SCREENING MAMMOGRAM FOR BREAST CANCER: ICD-10-CM

## 2024-05-13 PROCEDURE — 77067 SCR MAMMO BI INCL CAD: CPT | Performed by: RADIOLOGY

## 2024-05-13 PROCEDURE — 77067 SCR MAMMO BI INCL CAD: CPT

## 2024-05-13 PROCEDURE — 77063 BREAST TOMOSYNTHESIS BI: CPT | Performed by: RADIOLOGY

## 2024-05-14 ENCOUNTER — TELEPHONE (OUTPATIENT)
Dept: PRIMARY CARE | Facility: CLINIC | Age: 74
End: 2024-05-14
Payer: MEDICARE

## 2024-05-14 NOTE — TELEPHONE ENCOUNTER
----- Message from Christopher Sam MD sent at 5/14/2024 11:17 AM EDT -----  Please let pt know that her mammogram was normal.

## 2024-05-29 DIAGNOSIS — I10 BENIGN ESSENTIAL HYPERTENSION: ICD-10-CM

## 2024-05-29 DIAGNOSIS — K21.9 GASTROESOPHAGEAL REFLUX DISEASE WITHOUT ESOPHAGITIS: ICD-10-CM

## 2024-05-29 NOTE — TELEPHONE ENCOUNTER
Pt called rx line @ 8:57am requesting RF on Metoprolol, Omeprazole. TYE Tang    Next OV 7/23/24  Pt compliant  Please advise. Thanks. JW

## 2024-06-02 RX ORDER — OMEPRAZOLE 20 MG/1
20 TABLET, DELAYED RELEASE ORAL
Qty: 90 TABLET | Refills: 3 | Status: SHIPPED | OUTPATIENT
Start: 2024-06-02 | End: 2025-06-02

## 2024-06-02 RX ORDER — METOPROLOL SUCCINATE 50 MG/1
50 TABLET, EXTENDED RELEASE ORAL DAILY
Qty: 90 TABLET | Refills: 3 | Status: SHIPPED | OUTPATIENT
Start: 2024-06-02 | End: 2025-06-02

## 2024-06-02 RX ORDER — ONDANSETRON 4 MG/1
4 TABLET, FILM COATED ORAL EVERY 8 HOURS PRN
Qty: 90 TABLET | Refills: 1 | Status: SHIPPED | OUTPATIENT
Start: 2024-06-02

## 2024-07-08 DIAGNOSIS — F32.A DEPRESSIVE DISORDER: ICD-10-CM

## 2024-07-08 RX ORDER — DULOXETIN HYDROCHLORIDE 30 MG/1
30 CAPSULE, DELAYED RELEASE ORAL DAILY
Qty: 90 CAPSULE | Refills: 1 | Status: SHIPPED | OUTPATIENT
Start: 2024-07-08 | End: 2025-01-04

## 2024-07-08 NOTE — TELEPHONE ENCOUNTER
Pt requesting refill, she will be out in the next few days.      DULoxetine - [facility]

## 2024-07-16 ENCOUNTER — LAB (OUTPATIENT)
Dept: LAB | Facility: LAB | Age: 74
End: 2024-07-16
Payer: MEDICARE

## 2024-07-16 DIAGNOSIS — E11.9 TYPE 2 DIABETES MELLITUS WITHOUT COMPLICATION, WITH LONG-TERM CURRENT USE OF INSULIN (MULTI): ICD-10-CM

## 2024-07-16 DIAGNOSIS — Z79.4 TYPE 2 DIABETES MELLITUS WITHOUT COMPLICATION, WITH LONG-TERM CURRENT USE OF INSULIN (MULTI): ICD-10-CM

## 2024-07-16 LAB
ALBUMIN SERPL BCP-MCNC: 4.2 G/DL (ref 3.4–5)
ALP SERPL-CCNC: 98 U/L (ref 33–136)
ALT SERPL W P-5'-P-CCNC: 11 U/L (ref 7–45)
ANION GAP SERPL CALC-SCNC: 10 MMOL/L (ref 10–20)
AST SERPL W P-5'-P-CCNC: 15 U/L (ref 9–39)
BILIRUB SERPL-MCNC: 0.7 MG/DL (ref 0–1.2)
BUN SERPL-MCNC: 18 MG/DL (ref 6–23)
CALCIUM SERPL-MCNC: 9.3 MG/DL (ref 8.6–10.3)
CHLORIDE SERPL-SCNC: 104 MMOL/L (ref 98–107)
CHOLEST SERPL-MCNC: 161 MG/DL (ref 0–199)
CHOLESTEROL/HDL RATIO: 2.5
CO2 SERPL-SCNC: 27 MMOL/L (ref 21–32)
CREAT SERPL-MCNC: 0.78 MG/DL (ref 0.5–1.05)
EGFRCR SERPLBLD CKD-EPI 2021: 81 ML/MIN/1.73M*2
EST. AVERAGE GLUCOSE BLD GHB EST-MCNC: 163 MG/DL
GLUCOSE SERPL-MCNC: 172 MG/DL (ref 74–99)
HBA1C MFR BLD: 7.3 %
HDLC SERPL-MCNC: 63.6 MG/DL
LDLC SERPL CALC-MCNC: 76 MG/DL
NON HDL CHOLESTEROL: 97 MG/DL (ref 0–149)
POTASSIUM SERPL-SCNC: 4.9 MMOL/L (ref 3.5–5.3)
PROT SERPL-MCNC: 6.9 G/DL (ref 6.4–8.2)
SODIUM SERPL-SCNC: 136 MMOL/L (ref 136–145)
TRIGL SERPL-MCNC: 108 MG/DL (ref 0–149)
VLDL: 22 MG/DL (ref 0–40)

## 2024-07-16 PROCEDURE — 80053 COMPREHEN METABOLIC PANEL: CPT

## 2024-07-16 PROCEDURE — 80061 LIPID PANEL: CPT

## 2024-07-16 PROCEDURE — 36415 COLL VENOUS BLD VENIPUNCTURE: CPT

## 2024-07-16 PROCEDURE — 83036 HEMOGLOBIN GLYCOSYLATED A1C: CPT

## 2024-07-23 ENCOUNTER — APPOINTMENT (OUTPATIENT)
Dept: PRIMARY CARE | Facility: CLINIC | Age: 74
End: 2024-07-23
Payer: MEDICARE

## 2024-07-23 VITALS
DIASTOLIC BLOOD PRESSURE: 58 MMHG | OXYGEN SATURATION: 97 % | BODY MASS INDEX: 21.68 KG/M2 | SYSTOLIC BLOOD PRESSURE: 98 MMHG | WEIGHT: 127 LBS | HEART RATE: 69 BPM | HEIGHT: 64 IN | TEMPERATURE: 97.3 F

## 2024-07-23 DIAGNOSIS — Z00.00 MEDICARE ANNUAL WELLNESS VISIT, SUBSEQUENT: Primary | ICD-10-CM

## 2024-07-23 DIAGNOSIS — E11.40 TYPE 2 DIABETES MELLITUS WITH DIABETIC NEUROPATHY, WITH LONG-TERM CURRENT USE OF INSULIN (MULTI): ICD-10-CM

## 2024-07-23 DIAGNOSIS — E78.2 MIXED HYPERLIPIDEMIA: ICD-10-CM

## 2024-07-23 DIAGNOSIS — E27.8 OTHER SPECIFIED DISORDERS OF ADRENAL GLAND (MULTI): ICD-10-CM

## 2024-07-23 DIAGNOSIS — Z79.4 TYPE 2 DIABETES MELLITUS WITHOUT COMPLICATION, WITH LONG-TERM CURRENT USE OF INSULIN (MULTI): ICD-10-CM

## 2024-07-23 DIAGNOSIS — Z79.4 TYPE 2 DIABETES MELLITUS WITH DIABETIC NEUROPATHY, WITH LONG-TERM CURRENT USE OF INSULIN (MULTI): ICD-10-CM

## 2024-07-23 DIAGNOSIS — I10 BENIGN ESSENTIAL HYPERTENSION: ICD-10-CM

## 2024-07-23 DIAGNOSIS — E46 PROTEIN-CALORIE MALNUTRITION, UNSPECIFIED SEVERITY (MULTI): ICD-10-CM

## 2024-07-23 DIAGNOSIS — C68.9 UROTHELIAL CANCER (MULTI): ICD-10-CM

## 2024-07-23 DIAGNOSIS — F03.A0 MILD DEMENTIA WITHOUT BEHAVIORAL DISTURBANCE, PSYCHOTIC DISTURBANCE, MOOD DISTURBANCE, OR ANXIETY, UNSPECIFIED DEMENTIA TYPE (MULTI): ICD-10-CM

## 2024-07-23 DIAGNOSIS — E11.65 CONTROLLED TYPE 2 DIABETES MELLITUS WITH HYPERGLYCEMIA, WITH LONG-TERM CURRENT USE OF INSULIN (MULTI): ICD-10-CM

## 2024-07-23 DIAGNOSIS — I10 ESSENTIAL HYPERTENSION: ICD-10-CM

## 2024-07-23 DIAGNOSIS — E11.9 TYPE 2 DIABETES MELLITUS WITHOUT COMPLICATION, WITH LONG-TERM CURRENT USE OF INSULIN (MULTI): ICD-10-CM

## 2024-07-23 DIAGNOSIS — F32.A DEPRESSIVE DISORDER: ICD-10-CM

## 2024-07-23 DIAGNOSIS — J44.9 CHRONIC OBSTRUCTIVE PULMONARY DISEASE, UNSPECIFIED COPD TYPE (MULTI): ICD-10-CM

## 2024-07-23 DIAGNOSIS — Z79.4 CONTROLLED TYPE 2 DIABETES MELLITUS WITH HYPERGLYCEMIA, WITH LONG-TERM CURRENT USE OF INSULIN (MULTI): ICD-10-CM

## 2024-07-23 DIAGNOSIS — M25.551 HIP PAIN, RIGHT: ICD-10-CM

## 2024-07-23 PROCEDURE — 3074F SYST BP LT 130 MM HG: CPT | Performed by: FAMILY MEDICINE

## 2024-07-23 PROCEDURE — 1158F ADVNC CARE PLAN TLK DOCD: CPT | Performed by: FAMILY MEDICINE

## 2024-07-23 PROCEDURE — G0444 DEPRESSION SCREEN ANNUAL: HCPCS | Performed by: FAMILY MEDICINE

## 2024-07-23 PROCEDURE — 3008F BODY MASS INDEX DOCD: CPT | Performed by: FAMILY MEDICINE

## 2024-07-23 PROCEDURE — 1159F MED LIST DOCD IN RCRD: CPT | Performed by: FAMILY MEDICINE

## 2024-07-23 PROCEDURE — 3048F LDL-C <100 MG/DL: CPT | Performed by: FAMILY MEDICINE

## 2024-07-23 PROCEDURE — 1170F FXNL STATUS ASSESSED: CPT | Performed by: FAMILY MEDICINE

## 2024-07-23 PROCEDURE — 99214 OFFICE O/P EST MOD 30 MIN: CPT | Performed by: FAMILY MEDICINE

## 2024-07-23 PROCEDURE — G0439 PPPS, SUBSEQ VISIT: HCPCS | Performed by: FAMILY MEDICINE

## 2024-07-23 PROCEDURE — 3051F HG A1C>EQUAL 7.0%<8.0%: CPT | Performed by: FAMILY MEDICINE

## 2024-07-23 PROCEDURE — 3078F DIAST BP <80 MM HG: CPT | Performed by: FAMILY MEDICINE

## 2024-07-23 PROCEDURE — 4010F ACE/ARB THERAPY RXD/TAKEN: CPT | Performed by: FAMILY MEDICINE

## 2024-07-23 PROCEDURE — 1123F ACP DISCUSS/DSCN MKR DOCD: CPT | Performed by: FAMILY MEDICINE

## 2024-07-23 ASSESSMENT — ACTIVITIES OF DAILY LIVING (ADL)
MANAGING_FINANCES: INDEPENDENT
BATHING: INDEPENDENT
DOING_HOUSEWORK: INDEPENDENT
DRESSING: INDEPENDENT
GROCERY_SHOPPING: INDEPENDENT
TAKING_MEDICATION: INDEPENDENT

## 2024-07-23 NOTE — PROGRESS NOTES
Subjective   Patient ID: Nian Oquendo is a 74 y.o. female who presents for Diabetes (Review BW) and Medicare Annual Wellness Visit Subsequent.  Diabetes    Preparing meals - independent  Using telephone - independent  Bladder - continent  Bowel - continent    Recent hospital stays - none    ADLs - able to dress, walk and bath independently  Instrumental ADL's - able to shop, maintain finances, managing medications, housekeeping independently    Depression: PHQ-2 (screening 2 mins) - negative    Opioid use -   none  Exercise -  not much  Diet - well balanced  Pain score -    Providers    [place] - 13/22    Education - educated pt on healthy eating, exercise, weight loss    Falls - 2 falls this year      1. DM: a1c was 7.3.  Weight is down 3 lbs.    2. Lipids: denies chest pain. taking medication.     3. Tobacco use: still smoking    4. HTN: well-controlled with medication .does not measure at home.     5. Adrenal carcinoma: now cancer free.            Counseled pt on living will: has living will    CV screening: CA score was low in 2017    Colonoscopy: needs another on 2025    Diabetes screening: a1c was 7.3.    Glaucoma screen: rec'ed seeing optho for her DM2    CT chest tob screen: screening CT ordered last year but didn't do it.    Mammo: ok in 2024    DEXA: Osteopenia in 2024    PAP/pelvis: s/p hysterectomy    Vaccines:  had flu, PNA shots. COVID boosted    Past Medical History:   Diagnosis Date    Anxiety     Arthritis     COPD (chronic obstructive pulmonary disease) (Multi)     Depression     GERD (gastroesophageal reflux disease)     Hyperlipidemia     Hypertension     Type 2 diabetes mellitus without complications (Multi) 12/04/2013    Diabetes mellitus       Social Connections: Not on file       Current Outpatient Medications on File Prior to Visit   Medication Sig Dispense Refill    atorvastatin (Lipitor) 40 mg tablet Take 1 tablet (40 mg) by mouth once daily. 90 tablet 3    blood sugar diagnostic (Prodigy  "No Coding) strip Prodigy Pressure Use to check 2x a day 200 strip 0    blood-glucose sensor (FreeStyle Mukund 3 Sensor) device Apply under skin [id number] 6 each 3    DULoxetine (Cymbalta) 30 mg DR capsule Take 1 capsule (30 mg) by mouth once daily. Do not crush or chew. 90 capsule 1    FreeStyle lancets 28 gauge 1 each 2 times a day. Prodigy Pressure activated safety lancets 28G To check 2x a day, 3 mo supply 200 each 0    insulin lispro (HumaLOG) 100 unit/mL injection Inject 5 Units under the skin 3 times a day with meals. Take as directed per insulin instructions 13.5 mL 3    losartan (Cozaar) 50 mg tablet Take 1 tablet (50 mg) by mouth once daily. 90 tablet 3    metoprolol succinate XL (Toprol-XL) 50 mg 24 hr tablet Take 1 tablet (50 mg) by mouth once daily. 90 tablet 3    omeprazole (PriLOSEC) 20 mg DR capsule Take 1 capsule (20 mg) by mouth early in the morning..      omeprazole OTC (PriLOSEC OTC) 20 mg EC tablet Take 1 tablet (20 mg) by mouth once daily in the morning. Take before meals. 90 tablet 3    ondansetron (Zofran) 4 mg tablet Take 1 tablet (4 mg) by mouth every 8 hours if needed for nausea. 90 tablet 1    pen needle, diabetic 31 gauge x 5/16\" needle 1 each 3 times a day. 300 each 3     No current facility-administered medications on file prior to visit.        Vitals:    07/23/24 0955   BP: 98/58   Pulse: 69   Temp: 36.3 °C (97.3 °F)   SpO2: 97%     Vitals:    07/23/24 0955   Weight: 57.6 kg (127 lb)         Review of Systems   All other systems reviewed and are negative.      Objective     Physical Exam  Vitals reviewed.   Constitutional:       General: She is not in acute distress.     Appearance: Normal appearance. She is well-developed. She is not diaphoretic.   HENT:      Head: Normocephalic and atraumatic.      Right Ear: Tympanic membrane normal.      Left Ear: Tympanic membrane normal.      Nose: Nose normal.      Mouth/Throat:      Mouth: Mucous membranes are moist.   Eyes:      Pupils: Pupils " are equal, round, and reactive to light.   Cardiovascular:      Rate and Rhythm: Normal rate and regular rhythm.      Heart sounds: Normal heart sounds. No murmur heard.     No friction rub. No gallop.   Pulmonary:      Effort: Pulmonary effort is normal.      Breath sounds: Normal breath sounds. No rales.   Abdominal:      General: Bowel sounds are normal.      Palpations: Abdomen is soft.      Tenderness: There is no abdominal tenderness.   Musculoskeletal:      Cervical back: Normal range of motion and neck supple.   Skin:     General: Skin is warm and dry.   Neurological:      Mental Status: She is alert.   Psychiatric:         Mood and Affect: Mood normal.         Lab on 07/16/2024   Component Date Value Ref Range Status    Glucose 07/16/2024 172 (H)  74 - 99 mg/dL Final    Sodium 07/16/2024 136  136 - 145 mmol/L Final    Potassium 07/16/2024 4.9  3.5 - 5.3 mmol/L Final    Chloride 07/16/2024 104  98 - 107 mmol/L Final    Bicarbonate 07/16/2024 27  21 - 32 mmol/L Final    Anion Gap 07/16/2024 10  10 - 20 mmol/L Final    Urea Nitrogen 07/16/2024 18  6 - 23 mg/dL Final    Creatinine 07/16/2024 0.78  0.50 - 1.05 mg/dL Final    eGFR 07/16/2024 81  >60 mL/min/1.73m*2 Final    Calculations of estimated GFR are performed using the 2021 CKD-EPI Study Refit equation without the race variable for the IDMS-Traceable creatinine methods.  https://jasn.asnjournals.org/content/early/2021/09/22/ASN.9253514930    Calcium 07/16/2024 9.3  8.6 - 10.3 mg/dL Final    Albumin 07/16/2024 4.2  3.4 - 5.0 g/dL Final    Alkaline Phosphatase 07/16/2024 98  33 - 136 U/L Final    Total Protein 07/16/2024 6.9  6.4 - 8.2 g/dL Final    AST 07/16/2024 15  9 - 39 U/L Final    Bilirubin, Total 07/16/2024 0.7  0.0 - 1.2 mg/dL Final    ALT 07/16/2024 11  7 - 45 U/L Final    Patients treated with Sulfasalazine may generate falsely decreased results for ALT.    Cholesterol 07/16/2024 161  0 - 199 mg/dL Final          Age      Desirable   Borderline  High   High     0-19 Y     0 - 169       170 - 199     >/= 200    20-24 Y     0 - 189       190 - 224     >/= 225         >24 Y     0 - 199       200 - 239     >/= 240   **All ranges are based on fasting samples. Specific   therapeutic targets will vary based on patient-specific   cardiac risk.    Pediatric guidelines reference:Pediatrics 2011, 128(S5).Adult guidelines reference: NCEP ATPIII Guidelines,MORIAH 2001, 258:6176-58    Venipuncture immediately after or during the administration of Metamizole may lead to falsely low results. Testing should be performed immediately prior to Metamizole dosing.    HDL-Cholesterol 07/16/2024 63.6  mg/dL Final      Age       Very Low   Low     Normal    High    0-19 Y    < 35      < 40     40-45     ----  20-24 Y    ----     < 40      >45      ----        >24 Y      ----     < 40     40-60      >60      Cholesterol/HDL Ratio 07/16/2024 2.5   Final      Ref Values  Desirable  < 3.4  High Risk  > 5.0    LDL Calculated 07/16/2024 76  <=99 mg/dL Final                                Near   Borderline      AGE      Desirable  Optimal    High     High     Very High     0-19 Y     0 - 109     ---    110-129   >/= 130     ----    20-24 Y     0 - 119     ---    120-159   >/= 160     ----      >24 Y     0 -  99   100-129  130-159   160-189     >/=190      VLDL 07/16/2024 22  0 - 40 mg/dL Final    Triglycerides 07/16/2024 108  0 - 149 mg/dL Final       Age         Desirable   Borderline High   High     Very High   0 D-90 D    19 - 174         ----         ----        ----  91 D- 9 Y     0 -  74        75 -  99     >/= 100      ----    10-19 Y     0 -  89        90 - 129     >/= 130      ----    20-24 Y     0 - 114       115 - 149     >/= 150      ----         >24 Y     0 - 149       150 - 199    200- 499    >/= 500    Venipuncture immediately after or during the administration of Metamizole may lead to falsely low results. Testing should be performed immediately prior to Metamizole dosing.     Non HDL Cholesterol 07/16/2024 97  0 - 149 mg/dL Final          Age       Desirable   Borderline High   High     Very High     0-19 Y     0 - 119       120 - 144     >/= 145    >/= 160    20-24 Y     0 - 149       150 - 189     >/= 190      ----         >24 Y    30 mg/dL above LDL Cholesterol goal      Hemoglobin A1C 07/16/2024 7.3 (H)  see below % Final    Estimated Average Glucose 07/16/2024 163  Not Established mg/dL Final       Assessment/Plan   Problem List Items Addressed This Visit       Type 2 diabetes mellitus with diabetic neuropathy, with long-term current use of insulin (Multi)    Hyperlipidemia    Chronic obstructive lung disease (Multi)    Benign essential hypertension    Essential hypertension    Depressive disorder    Protein-calorie malnutrition, unspecified severity (Multi)    Urothelial cancer (Multi)    Other specified disorders of adrenal gland (Multi)     Other Visit Diagnoses       Medicare annual wellness visit, subsequent    -  Primary    Hip pain, right        Controlled type 2 diabetes mellitus with hyperglycemia, with long-term current use of insulin (Multi)        Relevant Orders    Referral to [facility]    Mild dementia without behavioral disturbance, psychotic disturbance, mood disturbance, or anxiety, unspecified dementia type (Multi)                Doing well.  Refilled meds.  Follow up in 4 mo.  Encouraged to cut down on smoking.  Pts ROSY indicates likely some MCI or dementia.  Will monitor for worsening signs.

## 2024-07-29 ENCOUNTER — OFFICE VISIT (OUTPATIENT)
Dept: PRIMARY CARE | Facility: CLINIC | Age: 74
End: 2024-07-29
Payer: MEDICARE

## 2024-07-29 VITALS
OXYGEN SATURATION: 95 % | BODY MASS INDEX: 21.97 KG/M2 | SYSTOLIC BLOOD PRESSURE: 124 MMHG | WEIGHT: 127 LBS | TEMPERATURE: 96.9 F | DIASTOLIC BLOOD PRESSURE: 84 MMHG | HEART RATE: 74 BPM

## 2024-07-29 DIAGNOSIS — N30.01 ACUTE CYSTITIS WITH HEMATURIA: ICD-10-CM

## 2024-07-29 DIAGNOSIS — R32 URINARY INCONTINENCE, UNSPECIFIED TYPE: Primary | ICD-10-CM

## 2024-07-29 LAB
POC APPEARANCE, URINE: ABNORMAL
POC BILIRUBIN, URINE: ABNORMAL
POC BLOOD, URINE: ABNORMAL
POC COLOR, URINE: ABNORMAL
POC GLUCOSE, URINE: NEGATIVE MG/DL
POC KETONES, URINE: ABNORMAL MG/DL
POC LEUKOCYTES, URINE: ABNORMAL
POC NITRITE,URINE: POSITIVE
POC PH, URINE: 7 PH
POC PROTEIN, URINE: ABNORMAL MG/DL
POC SPECIFIC GRAVITY, URINE: 1.02
POC UROBILINOGEN, URINE: 2 EU/DL

## 2024-07-29 PROCEDURE — 3048F LDL-C <100 MG/DL: CPT | Performed by: FAMILY MEDICINE

## 2024-07-29 PROCEDURE — 81003 URINALYSIS AUTO W/O SCOPE: CPT | Performed by: FAMILY MEDICINE

## 2024-07-29 PROCEDURE — 3079F DIAST BP 80-89 MM HG: CPT | Performed by: FAMILY MEDICINE

## 2024-07-29 PROCEDURE — 87186 SC STD MICRODIL/AGAR DIL: CPT

## 2024-07-29 PROCEDURE — 1123F ACP DISCUSS/DSCN MKR DOCD: CPT | Performed by: FAMILY MEDICINE

## 2024-07-29 PROCEDURE — 4010F ACE/ARB THERAPY RXD/TAKEN: CPT | Performed by: FAMILY MEDICINE

## 2024-07-29 PROCEDURE — 87086 URINE CULTURE/COLONY COUNT: CPT

## 2024-07-29 PROCEDURE — 1159F MED LIST DOCD IN RCRD: CPT | Performed by: FAMILY MEDICINE

## 2024-07-29 PROCEDURE — 99213 OFFICE O/P EST LOW 20 MIN: CPT | Performed by: FAMILY MEDICINE

## 2024-07-29 PROCEDURE — 3074F SYST BP LT 130 MM HG: CPT | Performed by: FAMILY MEDICINE

## 2024-07-29 PROCEDURE — 3051F HG A1C>EQUAL 7.0%<8.0%: CPT | Performed by: FAMILY MEDICINE

## 2024-07-29 RX ORDER — SULFAMETHOXAZOLE AND TRIMETHOPRIM 800; 160 MG/1; MG/1
1 TABLET ORAL 2 TIMES DAILY
Qty: 10 TABLET | Refills: 0 | Status: SHIPPED | OUTPATIENT
Start: 2024-07-29 | End: 2024-08-03

## 2024-07-29 NOTE — PROGRESS NOTES
Subjective   Patient ID: Nina Oquendo is a 74 y.o. female who presents for Urinary Incontinence (X 3 days).  HPI  pt having urine incontinence x 3 days.  No dysuria or fever.  Dark colored urine.    Patient Active Problem List   Diagnosis    Type 2 diabetes mellitus with diabetic neuropathy, with long-term current use of insulin (Multi)    Hyperlipidemia    Chronic obstructive lung disease (Multi)    Benign essential hypertension    Essential hypertension    Depressive disorder    Protein-calorie malnutrition, unspecified severity (Multi)    Anxiety disorder, unspecified    Gastroesophageal reflux disease without esophagitis    Urothelial cancer (Multi)    Other specified disorders of adrenal gland (Multi)       Social Connections: Not on file       Current Outpatient Medications on File Prior to Visit   Medication Sig Dispense Refill    atorvastatin (Lipitor) 40 mg tablet Take 1 tablet (40 mg) by mouth once daily. 90 tablet 3    blood sugar diagnostic (Prodigy No Coding) strip Prodigy Pressure Use to check 2x a day 200 strip 0    blood-glucose sensor (FreeStyle Mukund 3 Sensor) device Apply under skin [id number] 6 each 3    DULoxetine (Cymbalta) 30 mg DR capsule Take 1 capsule (30 mg) by mouth once daily. Do not crush or chew. 90 capsule 1    FreeStyle lancets 28 gauge 1 each 2 times a day. Prodigy Pressure activated safety lancets 28G To check 2x a day, 3 mo supply 200 each 0    insulin lispro (HumaLOG) 100 unit/mL injection Inject 5 Units under the skin 3 times a day with meals. Take as directed per insulin instructions 13.5 mL 3    losartan (Cozaar) 50 mg tablet Take 1 tablet (50 mg) by mouth once daily. 90 tablet 3    metoprolol succinate XL (Toprol-XL) 50 mg 24 hr tablet Take 1 tablet (50 mg) by mouth once daily. 90 tablet 3    omeprazole (PriLOSEC) 20 mg DR capsule Take 1 capsule (20 mg) by mouth early in the morning..      omeprazole OTC (PriLOSEC OTC) 20 mg EC tablet Take 1 tablet (20 mg) by mouth once daily  "in the morning. Take before meals. 90 tablet 3    ondansetron (Zofran) 4 mg tablet Take 1 tablet (4 mg) by mouth every 8 hours if needed for nausea. 90 tablet 1    pen needle, diabetic 31 gauge x 5/16\" needle 1 each 3 times a day. 300 each 3     No current facility-administered medications on file prior to visit.        Vitals:    07/29/24 1127   BP: 124/84   Pulse: 74   Temp: 36.1 °C (96.9 °F)   SpO2: 95%     Vitals:    07/29/24 1127   Weight: 57.6 kg (127 lb)       Review of Systems   All other systems reviewed and are negative.      Objective     Physical Exam  Constitutional:       Appearance: Normal appearance. She is well-developed.   HENT:      Head: Atraumatic.   Cardiovascular:      Rate and Rhythm: Normal rate and regular rhythm.      Heart sounds: Normal heart sounds. No murmur heard.  Pulmonary:      Effort: Pulmonary effort is normal.      Breath sounds: Normal breath sounds.   Abdominal:      General: Bowel sounds are normal.      Palpations: Abdomen is soft.   Skin:     General: Skin is warm.   Neurological:      General: No focal deficit present.      Mental Status: She is alert.   Psychiatric:         Mood and Affect: Mood normal.         Lab on 07/16/2024   Component Date Value Ref Range Status    Glucose 07/16/2024 172 (H)  74 - 99 mg/dL Final    Sodium 07/16/2024 136  136 - 145 mmol/L Final    Potassium 07/16/2024 4.9  3.5 - 5.3 mmol/L Final    Chloride 07/16/2024 104  98 - 107 mmol/L Final    Bicarbonate 07/16/2024 27  21 - 32 mmol/L Final    Anion Gap 07/16/2024 10  10 - 20 mmol/L Final    Urea Nitrogen 07/16/2024 18  6 - 23 mg/dL Final    Creatinine 07/16/2024 0.78  0.50 - 1.05 mg/dL Final    eGFR 07/16/2024 81  >60 mL/min/1.73m*2 Final    Calculations of estimated GFR are performed using the 2021 CKD-EPI Study Refit equation without the race variable for the IDMS-Traceable creatinine methods.  https://jasn.asnjournals.org/content/early/2021/09/22/ASN.5876091941    Calcium 07/16/2024 9.3  8.6 " - 10.3 mg/dL Final    Albumin 07/16/2024 4.2  3.4 - 5.0 g/dL Final    Alkaline Phosphatase 07/16/2024 98  33 - 136 U/L Final    Total Protein 07/16/2024 6.9  6.4 - 8.2 g/dL Final    AST 07/16/2024 15  9 - 39 U/L Final    Bilirubin, Total 07/16/2024 0.7  0.0 - 1.2 mg/dL Final    ALT 07/16/2024 11  7 - 45 U/L Final    Patients treated with Sulfasalazine may generate falsely decreased results for ALT.    Cholesterol 07/16/2024 161  0 - 199 mg/dL Final          Age      Desirable   Borderline High   High     0-19 Y     0 - 169       170 - 199     >/= 200    20-24 Y     0 - 189       190 - 224     >/= 225         >24 Y     0 - 199       200 - 239     >/= 240   **All ranges are based on fasting samples. Specific   therapeutic targets will vary based on patient-specific   cardiac risk.    Pediatric guidelines reference:Pediatrics 2011, 128(S5).Adult guidelines reference: NCEP ATPIII Guidelines,MORIAH 2001, 258:2486-97    Venipuncture immediately after or during the administration of Metamizole may lead to falsely low results. Testing should be performed immediately prior to Metamizole dosing.    HDL-Cholesterol 07/16/2024 63.6  mg/dL Final      Age       Very Low   Low     Normal    High    0-19 Y    < 35      < 40     40-45     ----  20-24 Y    ----     < 40      >45      ----        >24 Y      ----     < 40     40-60      >60      Cholesterol/HDL Ratio 07/16/2024 2.5   Final      Ref Values  Desirable  < 3.4  High Risk  > 5.0    LDL Calculated 07/16/2024 76  <=99 mg/dL Final                                Near   Borderline      AGE      Desirable  Optimal    High     High     Very High     0-19 Y     0 - 109     ---    110-129   >/= 130     ----    20-24 Y     0 - 119     ---    120-159   >/= 160     ----      >24 Y     0 -  99   100-129  130-159   160-189     >/=190      VLDL 07/16/2024 22  0 - 40 mg/dL Final    Triglycerides 07/16/2024 108  0 - 149 mg/dL Final       Age         Desirable   Borderline High   High     Very  High   0 D-90 D    19 - 174         ----         ----        ----  91 D- 9 Y     0 -  74        75 -  99     >/= 100      ----    10-19 Y     0 -  89        90 - 129     >/= 130      ----    20-24 Y     0 - 114       115 - 149     >/= 150      ----         >24 Y     0 - 149       150 - 199    200- 499    >/= 500    Venipuncture immediately after or during the administration of Metamizole may lead to falsely low results. Testing should be performed immediately prior to Metamizole dosing.    Non HDL Cholesterol 07/16/2024 97  0 - 149 mg/dL Final          Age       Desirable   Borderline High   High     Very High     0-19 Y     0 - 119       120 - 144     >/= 145    >/= 160    20-24 Y     0 - 149       150 - 189     >/= 190      ----         >24 Y    30 mg/dL above LDL Cholesterol goal      Hemoglobin A1C 07/16/2024 7.3 (H)  see below % Final    Estimated Average Glucose 07/16/2024 163  Not Established mg/dL Final       Assessment/Plan   Problem List Items Addressed This Visit    None  Visit Diagnoses         Codes    Urinary incontinence, unspecified type    -  Primary R32    Relevant Orders    POCT UA Automated manually resulted (Completed)    Urine Culture    Acute cystitis with hematuria     N30.01    Relevant Medications    sulfamethoxazole-trimethoprim (Bactrim DS) 800-160 mg tablet          UA shows UTI. Sending Ucx.  Bactrim x 5 days

## 2024-07-30 ENCOUNTER — TELEPHONE (OUTPATIENT)
Dept: PRIMARY CARE | Facility: CLINIC | Age: 74
End: 2024-07-30
Payer: MEDICARE

## 2024-07-30 NOTE — TELEPHONE ENCOUNTER
----- Message from Christopher Sam sent at 7/30/2024  3:31 PM EDT -----  Please check to see if pt is feeling better with bactrim  
Called and spoke with pt- pt states she just started the Bactrim this morning so she has only had 1 dose. Please advise how to proceed? Did inform pt that if she felt like she was not getting better on the medication to let us know. Thanks, CG  
Finish course and let us know
Never

## 2024-07-31 DIAGNOSIS — N30.00 ACUTE CYSTITIS WITHOUT HEMATURIA: Primary | ICD-10-CM

## 2024-07-31 LAB — BACTERIA UR CULT: ABNORMAL

## 2024-07-31 RX ORDER — AMOXICILLIN AND CLAVULANATE POTASSIUM 875; 125 MG/1; MG/1
875 TABLET, FILM COATED ORAL 2 TIMES DAILY
Qty: 10 TABLET | Refills: 0 | Status: SHIPPED | OUTPATIENT
Start: 2024-07-31 | End: 2024-08-05

## 2024-07-31 NOTE — PROGRESS NOTES
Subjective   Patient ID: Nina Oquendo is a 74 y.o. female who presents for No chief complaint on file..  HPI    Patient Active Problem List   Diagnosis    Type 2 diabetes mellitus with diabetic neuropathy, with long-term current use of insulin (Multi)    Hyperlipidemia    Chronic obstructive lung disease (Multi)    Benign essential hypertension    Essential hypertension    Depressive disorder    Protein-calorie malnutrition, unspecified severity (Multi)    Anxiety disorder, unspecified    Gastroesophageal reflux disease without esophagitis    Urothelial cancer (Multi)    Other specified disorders of adrenal gland (Multi)       Social Connections: Not on file       Current Outpatient Medications on File Prior to Visit   Medication Sig Dispense Refill    atorvastatin (Lipitor) 40 mg tablet Take 1 tablet (40 mg) by mouth once daily. 90 tablet 3    blood sugar diagnostic (Prodigy No Coding) strip Prodigy Pressure Use to check 2x a day 200 strip 0    blood-glucose sensor (FreeStyle Mukund 3 Sensor) device Apply under skin [id number] 6 each 3    DULoxetine (Cymbalta) 30 mg DR capsule Take 1 capsule (30 mg) by mouth once daily. Do not crush or chew. 90 capsule 1    FreeStyle lancets 28 gauge 1 each 2 times a day. Prodigy Pressure activated safety lancets 28G To check 2x a day, 3 mo supply 200 each 0    insulin lispro (HumaLOG) 100 unit/mL injection Inject 5 Units under the skin 3 times a day with meals. Take as directed per insulin instructions 13.5 mL 3    losartan (Cozaar) 50 mg tablet Take 1 tablet (50 mg) by mouth once daily. 90 tablet 3    metoprolol succinate XL (Toprol-XL) 50 mg 24 hr tablet Take 1 tablet (50 mg) by mouth once daily. 90 tablet 3    omeprazole (PriLOSEC) 20 mg DR capsule Take 1 capsule (20 mg) by mouth early in the morning..      omeprazole OTC (PriLOSEC OTC) 20 mg EC tablet Take 1 tablet (20 mg) by mouth once daily in the morning. Take before meals. 90 tablet 3    ondansetron (Zofran) 4 mg tablet  "Take 1 tablet (4 mg) by mouth every 8 hours if needed for nausea. 90 tablet 1    pen needle, diabetic 31 gauge x 5/16\" needle 1 each 3 times a day. 300 each 3    sulfamethoxazole-trimethoprim (Bactrim DS) 800-160 mg tablet Take 1 tablet by mouth 2 times a day for 5 days. 10 tablet 0     No current facility-administered medications on file prior to visit.        There were no vitals filed for this visit.  There were no vitals filed for this visit.    Review of Systems    Objective     Physical Exam    Office Visit on 07/29/2024   Component Date Value Ref Range Status    POC Color, Urine 07/29/2024 Phyllis (A)  Straw, Yellow, Light-Yellow Final    POC Appearance, Urine 07/29/2024 Cloudy (A)  Clear Final    POC Glucose, Urine 07/29/2024 NEGATIVE  NEGATIVE mg/dl Final    POC Bilirubin, Urine 07/29/2024 SMALL (1+) (A)  NEGATIVE Final    POC Ketones, Urine 07/29/2024 TRACE (A)  NEGATIVE mg/dl Final    POC Specific Gravity, Urine 07/29/2024 1.020  1.005 - 1.035 Final    POC Blood, Urine 07/29/2024 MODERATE (2+) (A)  NEGATIVE Final    POC PH, Urine 07/29/2024 7.0  No Reference Range Established PH Final    POC Protein, Urine 07/29/2024 100 (2+) (A)  NEGATIVE, 30 (1+) mg/dl Final    POC Urobilinogen, Urine 07/29/2024 2.0 (A)  0.2, 1.0 EU/DL Final    Poc Nitrite, Urine 07/29/2024 POSITIVE (A)  NEGATIVE Final    POC Leukocytes, Urine 07/29/2024 LARGE (3+) (A)  NEGATIVE Final    Urine Culture 07/29/2024 >100,000 Proteus mirabilis (A)   Final   Lab on 07/16/2024   Component Date Value Ref Range Status    Glucose 07/16/2024 172 (H)  74 - 99 mg/dL Final    Sodium 07/16/2024 136  136 - 145 mmol/L Final    Potassium 07/16/2024 4.9  3.5 - 5.3 mmol/L Final    Chloride 07/16/2024 104  98 - 107 mmol/L Final    Bicarbonate 07/16/2024 27  21 - 32 mmol/L Final    Anion Gap 07/16/2024 10  10 - 20 mmol/L Final    Urea Nitrogen 07/16/2024 18  6 - 23 mg/dL Final    Creatinine 07/16/2024 0.78  0.50 - 1.05 mg/dL Final    eGFR 07/16/2024 81  >60 " mL/min/1.73m*2 Final    Calculations of estimated GFR are performed using the 2021 CKD-EPI Study Refit equation without the race variable for the IDMS-Traceable creatinine methods.  https://jasn.asnjournals.org/content/early/2021/09/22/ASN.8254582717    Calcium 07/16/2024 9.3  8.6 - 10.3 mg/dL Final    Albumin 07/16/2024 4.2  3.4 - 5.0 g/dL Final    Alkaline Phosphatase 07/16/2024 98  33 - 136 U/L Final    Total Protein 07/16/2024 6.9  6.4 - 8.2 g/dL Final    AST 07/16/2024 15  9 - 39 U/L Final    Bilirubin, Total 07/16/2024 0.7  0.0 - 1.2 mg/dL Final    ALT 07/16/2024 11  7 - 45 U/L Final    Patients treated with Sulfasalazine may generate falsely decreased results for ALT.    Cholesterol 07/16/2024 161  0 - 199 mg/dL Final          Age      Desirable   Borderline High   High     0-19 Y     0 - 169       170 - 199     >/= 200    20-24 Y     0 - 189       190 - 224     >/= 225         >24 Y     0 - 199       200 - 239     >/= 240   **All ranges are based on fasting samples. Specific   therapeutic targets will vary based on patient-specific   cardiac risk.    Pediatric guidelines reference:Pediatrics 2011, 128(S5).Adult guidelines reference: NCEP ATPIII Guidelines,MORIAH 2001, 258:2486-97    Venipuncture immediately after or during the administration of Metamizole may lead to falsely low results. Testing should be performed immediately prior to Metamizole dosing.    HDL-Cholesterol 07/16/2024 63.6  mg/dL Final      Age       Very Low   Low     Normal    High    0-19 Y    < 35      < 40     40-45     ----  20-24 Y    ----     < 40      >45      ----        >24 Y      ----     < 40     40-60      >60      Cholesterol/HDL Ratio 07/16/2024 2.5   Final      Ref Values  Desirable  < 3.4  High Risk  > 5.0    LDL Calculated 07/16/2024 76  <=99 mg/dL Final                                Near   Borderline      AGE      Desirable  Optimal    High     High     Very High     0-19 Y     0 - 109     ---    110-129   >/= 130      ----    20-24 Y     0 - 119     ---    120-159   >/= 160     ----      >24 Y     0 -  99   100-129  130-159   160-189     >/=190      VLDL 07/16/2024 22  0 - 40 mg/dL Final    Triglycerides 07/16/2024 108  0 - 149 mg/dL Final       Age         Desirable   Borderline High   High     Very High   0 D-90 D    19 - 174         ----         ----        ----  91 D- 9 Y     0 -  74        75 -  99     >/= 100      ----    10-19 Y     0 -  89        90 - 129     >/= 130      ----    20-24 Y     0 - 114       115 - 149     >/= 150      ----         >24 Y     0 - 149       150 - 199    200- 499    >/= 500    Venipuncture immediately after or during the administration of Metamizole may lead to falsely low results. Testing should be performed immediately prior to Metamizole dosing.    Non HDL Cholesterol 07/16/2024 97  0 - 149 mg/dL Final          Age       Desirable   Borderline High   High     Very High     0-19 Y     0 - 119       120 - 144     >/= 145    >/= 160    20-24 Y     0 - 149       150 - 189     >/= 190      ----         >24 Y    30 mg/dL above LDL Cholesterol goal      Hemoglobin A1C 07/16/2024 7.3 (H)  see below % Final    Estimated Average Glucose 07/16/2024 163  Not Established mg/dL Final       Assessment/Plan

## 2024-08-01 ENCOUNTER — TELEPHONE (OUTPATIENT)
Dept: PRIMARY CARE | Facility: CLINIC | Age: 74
End: 2024-08-01
Payer: MEDICARE

## 2024-08-01 NOTE — TELEPHONE ENCOUNTER
Pt does not want to talk to Dhara lama... pt states [facility] rx stronger abx for UTI. Pt asking was there something else/worse seen in her labs? She's just seeking a little more detail as the phone call with whoever she spoke to this morning was very vague. Pt did start taking abx.

## 2024-08-01 NOTE — TELEPHONE ENCOUNTER
----- Message from Crystal ACOSTA sent at 8/1/2024  8:01 AM EDT -----    ----- Message -----  From: Christopher Sam MD  Sent: 7/31/2024   1:03 PM EDT  To: Crystal Hines MA    Bacteria is resistant to bactrim.   Sent augmentin instead.

## 2024-08-09 ENCOUNTER — APPOINTMENT (OUTPATIENT)
Dept: ORTHOPEDIC SURGERY | Facility: CLINIC | Age: 74
End: 2024-08-09
Payer: MEDICARE

## 2024-08-13 ENCOUNTER — HOSPITAL ENCOUNTER (OUTPATIENT)
Dept: RADIOLOGY | Facility: EXTERNAL LOCATION | Age: 74
Discharge: HOME | End: 2024-08-13

## 2024-08-13 ENCOUNTER — APPOINTMENT (OUTPATIENT)
Dept: ORTHOPEDIC SURGERY | Facility: CLINIC | Age: 74
End: 2024-08-13
Payer: MEDICARE

## 2024-08-13 VITALS — BODY MASS INDEX: 22.5 KG/M2 | HEIGHT: 63 IN | WEIGHT: 127 LBS

## 2024-08-13 DIAGNOSIS — M16.11 PRIMARY LOCALIZED OSTEOARTHRITIS OF RIGHT HIP: Primary | ICD-10-CM

## 2024-08-13 PROCEDURE — 4010F ACE/ARB THERAPY RXD/TAKEN: CPT | Performed by: EMERGENCY MEDICINE

## 2024-08-13 PROCEDURE — 1159F MED LIST DOCD IN RCRD: CPT | Performed by: EMERGENCY MEDICINE

## 2024-08-13 PROCEDURE — 1123F ACP DISCUSS/DSCN MKR DOCD: CPT | Performed by: EMERGENCY MEDICINE

## 2024-08-13 PROCEDURE — 20611 DRAIN/INJ JOINT/BURSA W/US: CPT | Performed by: EMERGENCY MEDICINE

## 2024-08-13 PROCEDURE — 3008F BODY MASS INDEX DOCD: CPT | Performed by: EMERGENCY MEDICINE

## 2024-08-13 PROCEDURE — 99214 OFFICE O/P EST MOD 30 MIN: CPT | Performed by: EMERGENCY MEDICINE

## 2024-08-13 PROCEDURE — 3051F HG A1C>EQUAL 7.0%<8.0%: CPT | Performed by: EMERGENCY MEDICINE

## 2024-08-13 PROCEDURE — 3048F LDL-C <100 MG/DL: CPT | Performed by: EMERGENCY MEDICINE

## 2024-08-13 RX ORDER — LIDOCAINE HYDROCHLORIDE 10 MG/ML
4 INJECTION INFILTRATION; PERINEURAL
Status: COMPLETED | OUTPATIENT
Start: 2024-08-13 | End: 2024-08-13

## 2024-08-13 RX ORDER — METHYLPREDNISOLONE ACETATE 40 MG/ML
80 INJECTION, SUSPENSION INTRA-ARTICULAR; INTRALESIONAL; INTRAMUSCULAR; SOFT TISSUE
Status: COMPLETED | OUTPATIENT
Start: 2024-08-13 | End: 2024-08-13

## 2024-08-13 ASSESSMENT — PAIN - FUNCTIONAL ASSESSMENT: PAIN_FUNCTIONAL_ASSESSMENT: NO/DENIES PAIN

## 2024-08-13 NOTE — PROGRESS NOTES
Subjective    Patient ID: Nina Oquendo is a 74 y.o. female.    Chief Complaint: Pain of the Right Hip (Hip injection 5/9/2024, which lasted about 1.5 months. She would like an injection today and she is a diabetic.)     Last Surgery: No surgery found  Last Surgery Date: No surgery found    Nina is a 73-year-old female referred to me by Dr. Sanchez for acute on chronic right hip pain.  She has had pain that has been very significant for the past 2 years and would like to avoid a hip replacement if at all possible.  She takes Tylenol with minimal relief.  She has fallen in the past but denies any specific known traumatic injuries.  Her pain is all in her groin and is moderate in severity.  It radiates slightly laterally but is mostly anterior.  She has no numbness or weakness.  No lower back pain. We agreed to perform a cortisone injection of her right femoral acetabular joint under ultrasound guidance which she tolerated very well without any complications.  Activity modifications were reviewed.  She will also begin using naproxen twice daily for her pain and can follow-up with me on an as-needed basis if she responds well to this injection.  Eventually she would likely require hip replacement but she would like to postpone that as long as possible.    Update on 2/9/2024.  Nina is coming back in for a follow-up visit.  She says that after her injection on 11/7/2023 she had about 2 months of really good pain relief.  For the past several weeks though her pain has returned and she is here requesting another injection.  She did take a fall couple of weeks ago while going into her house from her patio but did not injure her hip.  She has no other complaints or today. We agreed to perform a repeat cortisone injection of her right femoral acetabular joint under ultrasound guidance which she tolerated very well without any complications.  Activity modifications were reviewed.  She will also continue using naproxen twice  daily for her pain and can follow-up with me on an as-needed basis if she responds well to this injection again.  Eventually she will likely require a hip replacement but she would like to postpone that as long as possible.    Update on 5/10/2024.  Nina is coming back in for a follow-up visit.  She is again having acute on chronic right hip pain.  Her last injection was on 2/9/2024 and it provided her with very good relief until about 1 week prior to arrival.  She is taking a combination of Motrin and Tylenol and is interested in another injection.  Of note she brought me in 2 vinyl records. We agreed to perform another right hip cortisone injection under ultrasound guidance.  The patient tolerated the procedure well without any complications.  Activity modifications were reviewed.  We had discussed her taking Tylenol 3 times daily at prescription dosing and trying to avoid NSAIDs if possible.  She will follow-up with me as needed moving forward.    Update on 8/13/2024.  Nina is coming back in for a follow-up visit again with her acute on chronic right hip pain.  Of note she has successfully lowered her hemoglobin A1c down to 7.2 with a goal of getting it below 7 before she can become a surgical candidate with Dr. Sanchez.  She is again having anterior right hip pain into the groin.  She is here today requesting another hip injection and is without any interval traumatic events or infectious symptoms.  No other complaints or today.            Objective   Right Hip Exam     Tenderness   The patient is experiencing no tenderness.     Range of Motion   Abduction:  abnormal   Adduction:  abnormal   Extension:  abnormal   Flexion:  abnormal   External rotation:  abnormal   Internal rotation:  abnormal     Muscle Strength   Abduction: 4/5   Adduction: 4/5   Flexion: 4/5     Tests   ELA: positive    Other   Erythema: absent  Sensation: normal  Pulse: present    Comments:  Examination grossly unchanged      Left Hip Exam    Left hip exam is normal.          Image Results:  No new imaging today    Patient ID: Nina Oquendo is a 74 y.o. female.    L Inj/Asp: R hip joint on 8/13/2024 1:53 PM  Indications: pain  Details: 20 G needle, ultrasound-guided anterior approach  Medications: 80 mg methylPREDNISolone acetate 40 mg/mL; 4 mL lidocaine 10 mg/mL (1 %)  Outcome: tolerated well, no immediate complications  Procedure, treatment alternatives, risks and benefits explained, specific risks discussed. Consent was given by the patient. Immediately prior to procedure a time out was called to verify the correct patient, procedure, equipment, support staff and site/side marked as required. Patient was prepped and draped in the usual sterile fashion.           Assessment/Plan   Encounter Diagnoses:  Primary localized osteoarthritis of right hip    Orders Placed This Encounter    L Inj/Asp    Point of Care Ultrasound     No follow-ups on file.    We discussed her treatment options and agreed to repeat a right hip cortisone injection under ultrasound guidance.  The patient tolerated the procedure well without any complications and activity modifications were reviewed.  She is going to continue her pain medication regimen and can take 1000 mg of Tylenol safely up to 3 times daily.  She will follow-up with me as needed moving forward, likely this fall if she wants or needs another injection but could potentially also follow-up with Dr. Sanchez instead if she lowers her hemoglobin A1c to less than 7.    ** Please excuse any errors in grammar or translation related to this dictation. Voice recognition software was utilized to prepare this document. **       Kyle Garber MD  [facility]

## 2024-08-14 ENCOUNTER — TELEPHONE (OUTPATIENT)
Dept: PRIMARY CARE | Facility: CLINIC | Age: 74
End: 2024-08-14
Payer: MEDICARE

## 2024-08-14 ENCOUNTER — OFFICE VISIT (OUTPATIENT)
Dept: PRIMARY CARE | Facility: CLINIC | Age: 74
End: 2024-08-14
Payer: MEDICARE

## 2024-08-14 VITALS
TEMPERATURE: 97.6 F | HEART RATE: 74 BPM | OXYGEN SATURATION: 97 % | SYSTOLIC BLOOD PRESSURE: 132 MMHG | DIASTOLIC BLOOD PRESSURE: 72 MMHG

## 2024-08-14 DIAGNOSIS — N30.00 ACUTE CYSTITIS WITHOUT HEMATURIA: ICD-10-CM

## 2024-08-14 DIAGNOSIS — R35.0 FREQUENCY OF URINATION: Primary | ICD-10-CM

## 2024-08-14 DIAGNOSIS — E27.8 OTHER SPECIFIED DISORDERS OF ADRENAL GLAND (MULTI): ICD-10-CM

## 2024-08-14 LAB
POC APPEARANCE, URINE: ABNORMAL
POC BILIRUBIN, URINE: NEGATIVE
POC BLOOD, URINE: ABNORMAL
POC COLOR, URINE: YELLOW
POC GLUCOSE, URINE: ABNORMAL MG/DL
POC KETONES, URINE: ABNORMAL MG/DL
POC LEUKOCYTES, URINE: ABNORMAL
POC NITRITE,URINE: POSITIVE
POC PH, URINE: 7 PH
POC PROTEIN, URINE: NEGATIVE MG/DL
POC SPECIFIC GRAVITY, URINE: 1.02
POC UROBILINOGEN, URINE: 1 EU/DL

## 2024-08-14 PROCEDURE — 3078F DIAST BP <80 MM HG: CPT | Performed by: FAMILY MEDICINE

## 2024-08-14 PROCEDURE — 1123F ACP DISCUSS/DSCN MKR DOCD: CPT | Performed by: FAMILY MEDICINE

## 2024-08-14 PROCEDURE — 99213 OFFICE O/P EST LOW 20 MIN: CPT | Performed by: FAMILY MEDICINE

## 2024-08-14 PROCEDURE — 81003 URINALYSIS AUTO W/O SCOPE: CPT | Performed by: FAMILY MEDICINE

## 2024-08-14 PROCEDURE — 3075F SYST BP GE 130 - 139MM HG: CPT | Performed by: FAMILY MEDICINE

## 2024-08-14 PROCEDURE — 3051F HG A1C>EQUAL 7.0%<8.0%: CPT | Performed by: FAMILY MEDICINE

## 2024-08-14 PROCEDURE — 1159F MED LIST DOCD IN RCRD: CPT | Performed by: FAMILY MEDICINE

## 2024-08-14 PROCEDURE — 87086 URINE CULTURE/COLONY COUNT: CPT

## 2024-08-14 PROCEDURE — 87186 SC STD MICRODIL/AGAR DIL: CPT

## 2024-08-14 PROCEDURE — 3048F LDL-C <100 MG/DL: CPT | Performed by: FAMILY MEDICINE

## 2024-08-14 PROCEDURE — 4010F ACE/ARB THERAPY RXD/TAKEN: CPT | Performed by: FAMILY MEDICINE

## 2024-08-14 RX ORDER — CIPROFLOXACIN 500 MG/1
500 TABLET ORAL 2 TIMES DAILY
Qty: 14 TABLET | Refills: 0 | Status: SHIPPED | OUTPATIENT
Start: 2024-08-14 | End: 2024-08-21

## 2024-08-14 NOTE — TELEPHONE ENCOUNTER
Pt has been taking abx for UTI for the last week and a half, pt asking if she can drop off urine sample and have her urine retested to see if her UTI has gone away. Pt not sure if UTI is gone or not.

## 2024-08-14 NOTE — PROGRESS NOTES
"Subjective   Patient ID: Nina Oquendo is a 74 y.o. female who presents for Urinary Frequency (And urgency x \" a while\").  HPI  Pt presents because she continues to have urinary frequency and episodes of loss of continence.  Finished her course of abx 2 weeks ago.  No fever or chills.    Patient Active Problem List   Diagnosis    Type 2 diabetes mellitus with diabetic neuropathy, with long-term current use of insulin (Multi)    Hyperlipidemia    Chronic obstructive lung disease (Multi)    Benign essential hypertension    Essential hypertension    Depressive disorder    Protein-calorie malnutrition, unspecified severity (Multi)    Anxiety disorder, unspecified    Gastroesophageal reflux disease without esophagitis    Urothelial cancer (Multi)    Other specified disorders of adrenal gland (Multi)       Social Connections: Not on file       Current Outpatient Medications on File Prior to Visit   Medication Sig Dispense Refill    atorvastatin (Lipitor) 40 mg tablet Take 1 tablet (40 mg) by mouth once daily. 90 tablet 3    blood sugar diagnostic (Prodigy No Coding) strip Prodigy Pressure Use to check 2x a day 200 strip 0    blood-glucose sensor (FreeStyle Mukund 3 Sensor) device Apply under skin [id number] 6 each 3    DULoxetine (Cymbalta) 30 mg DR capsule Take 1 capsule (30 mg) by mouth once daily. Do not crush or chew. 90 capsule 1    FreeStyle lancets 28 gauge 1 each 2 times a day. Prodigy Pressure activated safety lancets 28G To check 2x a day, 3 mo supply 200 each 0    insulin lispro (HumaLOG) 100 unit/mL injection Inject 5 Units under the skin 3 times a day with meals. Take as directed per insulin instructions 13.5 mL 3    losartan (Cozaar) 50 mg tablet Take 1 tablet (50 mg) by mouth once daily. 90 tablet 3    metoprolol succinate XL (Toprol-XL) 50 mg 24 hr tablet Take 1 tablet (50 mg) by mouth once daily. 90 tablet 3    omeprazole (PriLOSEC) 20 mg DR capsule Take 1 capsule (20 mg) by mouth early in the morning..   " "   omeprazole OTC (PriLOSEC OTC) 20 mg EC tablet Take 1 tablet (20 mg) by mouth once daily in the morning. Take before meals. 90 tablet 3    ondansetron (Zofran) 4 mg tablet Take 1 tablet (4 mg) by mouth every 8 hours if needed for nausea. 90 tablet 1    pen needle, diabetic 31 gauge x 5/16\" needle 1 each 3 times a day. 300 each 3     No current facility-administered medications on file prior to visit.        Vitals:    08/14/24 1519   BP: 132/72   Pulse: 74   Temp: 36.4 °C (97.6 °F)   SpO2: 97%     There were no vitals filed for this visit.    Review of Systems   All other systems reviewed and are negative.      Objective     Physical Exam  Constitutional:       Appearance: Normal appearance. She is well-developed.   HENT:      Head: Atraumatic.   Cardiovascular:      Rate and Rhythm: Normal rate and regular rhythm.      Heart sounds: Normal heart sounds. No murmur heard.  Pulmonary:      Effort: Pulmonary effort is normal.      Breath sounds: Normal breath sounds.   Abdominal:      General: Bowel sounds are normal.      Palpations: Abdomen is soft.   Skin:     General: Skin is warm.   Neurological:      General: No focal deficit present.      Mental Status: She is alert.   Psychiatric:         Mood and Affect: Mood normal.         Office Visit on 08/14/2024   Component Date Value Ref Range Status    POC Color, Urine 08/14/2024 Yellow  Straw, Yellow, Light-Yellow Final    POC Appearance, Urine 08/14/2024 Cloudy (A)  Clear Final    POC Glucose, Urine 08/14/2024 500 (3+) (A)  NEGATIVE mg/dl Final    POC Bilirubin, Urine 08/14/2024 NEGATIVE  NEGATIVE Final    POC Ketones, Urine 08/14/2024 TRACE (A)  NEGATIVE mg/dl Final    POC Specific Gravity, Urine 08/14/2024 1.020  1.005 - 1.035 Final    POC Blood, Urine 08/14/2024 TRACE-Intact (A)  NEGATIVE Final    POC PH, Urine 08/14/2024 7.0  No Reference Range Established PH Final    POC Protein, Urine 08/14/2024 NEGATIVE  NEGATIVE, 30 (1+) mg/dl Final    POC Urobilinogen, " Urine 08/14/2024 1.0  0.2, 1.0 EU/DL Final    Poc Nitrite, Urine 08/14/2024 POSITIVE (A)  NEGATIVE Final    POC Leukocytes, Urine 08/14/2024 SMALL (1+) (A)  NEGATIVE Final   Office Visit on 07/29/2024   Component Date Value Ref Range Status    POC Color, Urine 07/29/2024 Phyllis (A)  Straw, Yellow, Light-Yellow Final    POC Appearance, Urine 07/29/2024 Cloudy (A)  Clear Final    POC Glucose, Urine 07/29/2024 NEGATIVE  NEGATIVE mg/dl Final    POC Bilirubin, Urine 07/29/2024 SMALL (1+) (A)  NEGATIVE Final    POC Ketones, Urine 07/29/2024 TRACE (A)  NEGATIVE mg/dl Final    POC Specific Gravity, Urine 07/29/2024 1.020  1.005 - 1.035 Final    POC Blood, Urine 07/29/2024 MODERATE (2+) (A)  NEGATIVE Final    POC PH, Urine 07/29/2024 7.0  No Reference Range Established PH Final    POC Protein, Urine 07/29/2024 100 (2+) (A)  NEGATIVE, 30 (1+) mg/dl Final    POC Urobilinogen, Urine 07/29/2024 2.0 (A)  0.2, 1.0 EU/DL Final    Poc Nitrite, Urine 07/29/2024 POSITIVE (A)  NEGATIVE Final    POC Leukocytes, Urine 07/29/2024 LARGE (3+) (A)  NEGATIVE Final    Urine Culture 07/29/2024 >100,000 Proteus mirabilis (A)   Final   Lab on 07/16/2024   Component Date Value Ref Range Status    Glucose 07/16/2024 172 (H)  74 - 99 mg/dL Final    Sodium 07/16/2024 136  136 - 145 mmol/L Final    Potassium 07/16/2024 4.9  3.5 - 5.3 mmol/L Final    Chloride 07/16/2024 104  98 - 107 mmol/L Final    Bicarbonate 07/16/2024 27  21 - 32 mmol/L Final    Anion Gap 07/16/2024 10  10 - 20 mmol/L Final    Urea Nitrogen 07/16/2024 18  6 - 23 mg/dL Final    Creatinine 07/16/2024 0.78  0.50 - 1.05 mg/dL Final    eGFR 07/16/2024 81  >60 mL/min/1.73m*2 Final    Calculations of estimated GFR are performed using the 2021 CKD-EPI Study Refit equation without the race variable for the IDMS-Traceable creatinine methods.  https://jasn.asnjournals.org/content/early/2021/09/22/ASN.7965978192    Calcium 07/16/2024 9.3  8.6 - 10.3 mg/dL Final    Albumin 07/16/2024 4.2  3.4 -  5.0 g/dL Final    Alkaline Phosphatase 07/16/2024 98  33 - 136 U/L Final    Total Protein 07/16/2024 6.9  6.4 - 8.2 g/dL Final    AST 07/16/2024 15  9 - 39 U/L Final    Bilirubin, Total 07/16/2024 0.7  0.0 - 1.2 mg/dL Final    ALT 07/16/2024 11  7 - 45 U/L Final    Patients treated with Sulfasalazine may generate falsely decreased results for ALT.    Cholesterol 07/16/2024 161  0 - 199 mg/dL Final          Age      Desirable   Borderline High   High     0-19 Y     0 - 169       170 - 199     >/= 200    20-24 Y     0 - 189       190 - 224     >/= 225         >24 Y     0 - 199       200 - 239     >/= 240   **All ranges are based on fasting samples. Specific   therapeutic targets will vary based on patient-specific   cardiac risk.    Pediatric guidelines reference:Pediatrics 2011, 128(S5).Adult guidelines reference: NCEP ATPIII Guidelines,MORIAH 2001, 258:2486-97    Venipuncture immediately after or during the administration of Metamizole may lead to falsely low results. Testing should be performed immediately prior to Metamizole dosing.    HDL-Cholesterol 07/16/2024 63.6  mg/dL Final      Age       Very Low   Low     Normal    High    0-19 Y    < 35      < 40     40-45     ----  20-24 Y    ----     < 40      >45      ----        >24 Y      ----     < 40     40-60      >60      Cholesterol/HDL Ratio 07/16/2024 2.5   Final      Ref Values  Desirable  < 3.4  High Risk  > 5.0    LDL Calculated 07/16/2024 76  <=99 mg/dL Final                                Near   Borderline      AGE      Desirable  Optimal    High     High     Very High     0-19 Y     0 - 109     ---    110-129   >/= 130     ----    20-24 Y     0 - 119     ---    120-159   >/= 160     ----      >24 Y     0 -  99   100-129  130-159   160-189     >/=190      VLDL 07/16/2024 22  0 - 40 mg/dL Final    Triglycerides 07/16/2024 108  0 - 149 mg/dL Final       Age         Desirable   Borderline High   High     Very High   0 D-90 D    19 - 174         ----          ----        ----  91 D- 9 Y     0 -  74        75 -  99     >/= 100      ----    10-19 Y     0 -  89        90 - 129     >/= 130      ----    20-24 Y     0 - 114       115 - 149     >/= 150      ----         >24 Y     0 - 149       150 - 199    200- 499    >/= 500    Venipuncture immediately after or during the administration of Metamizole may lead to falsely low results. Testing should be performed immediately prior to Metamizole dosing.    Non HDL Cholesterol 07/16/2024 97  0 - 149 mg/dL Final          Age       Desirable   Borderline High   High     Very High     0-19 Y     0 - 119       120 - 144     >/= 145    >/= 160    20-24 Y     0 - 149       150 - 189     >/= 190      ----         >24 Y    30 mg/dL above LDL Cholesterol goal      Hemoglobin A1C 07/16/2024 7.3 (H)  see below % Final    Estimated Average Glucose 07/16/2024 163  Not Established mg/dL Final       Assessment/Plan   Problem List Items Addressed This Visit             ICD-10-CM    Other specified disorders of adrenal gland (Multi) E27.8     Other Visit Diagnoses         Codes    Frequency of urination    -  Primary R35.0    Relevant Orders    POCT UA Automated manually resulted (Completed)    Urine Culture (Completed)    Acute cystitis without hematuria     N30.00    Relevant Medications    ciprofloxacin (Cipro) 500 mg tablet          Wrote for cipro x 5 days

## 2024-08-17 LAB — BACTERIA UR CULT: ABNORMAL

## 2024-09-04 ENCOUNTER — APPOINTMENT (OUTPATIENT)
Dept: NUTRITION | Facility: HOSPITAL | Age: 74
End: 2024-09-04
Payer: MEDICARE

## 2024-09-09 ENCOUNTER — APPOINTMENT (OUTPATIENT)
Dept: ENDOCRINOLOGY | Facility: CLINIC | Age: 74
End: 2024-09-09
Payer: MEDICARE

## 2024-09-09 VITALS
WEIGHT: 128 LBS | HEART RATE: 85 BPM | BODY MASS INDEX: 21.85 KG/M2 | HEIGHT: 64 IN | SYSTOLIC BLOOD PRESSURE: 122 MMHG | DIASTOLIC BLOOD PRESSURE: 80 MMHG

## 2024-09-09 DIAGNOSIS — D35.02 ADENOMA OF LEFT ADRENAL GLAND: Primary | ICD-10-CM

## 2024-09-09 PROCEDURE — 3079F DIAST BP 80-89 MM HG: CPT | Performed by: INTERNAL MEDICINE

## 2024-09-09 PROCEDURE — 1159F MED LIST DOCD IN RCRD: CPT | Performed by: INTERNAL MEDICINE

## 2024-09-09 PROCEDURE — 3048F LDL-C <100 MG/DL: CPT | Performed by: INTERNAL MEDICINE

## 2024-09-09 PROCEDURE — 1123F ACP DISCUSS/DSCN MKR DOCD: CPT | Performed by: INTERNAL MEDICINE

## 2024-09-09 PROCEDURE — 99215 OFFICE O/P EST HI 40 MIN: CPT | Performed by: INTERNAL MEDICINE

## 2024-09-09 PROCEDURE — 1160F RVW MEDS BY RX/DR IN RCRD: CPT | Performed by: INTERNAL MEDICINE

## 2024-09-09 PROCEDURE — 3051F HG A1C>EQUAL 7.0%<8.0%: CPT | Performed by: INTERNAL MEDICINE

## 2024-09-09 PROCEDURE — 4010F ACE/ARB THERAPY RXD/TAKEN: CPT | Performed by: INTERNAL MEDICINE

## 2024-09-09 PROCEDURE — 3008F BODY MASS INDEX DOCD: CPT | Performed by: INTERNAL MEDICINE

## 2024-09-09 PROCEDURE — 3074F SYST BP LT 130 MM HG: CPT | Performed by: INTERNAL MEDICINE

## 2024-09-09 RX ORDER — DEXAMETHASONE 1 MG/1
TABLET ORAL
Qty: 1 TABLET | Refills: 0 | Status: SHIPPED | OUTPATIENT
Start: 2024-09-09 | End: 2024-09-19

## 2024-09-09 ASSESSMENT — ENCOUNTER SYMPTOMS
WEAKNESS: 1
CONSTIPATION: 1
NAUSEA: 0
LIGHT-HEADEDNESS: 1
DIARRHEA: 0
FATIGUE: 1
DIZZINESS: 1
ABDOMINAL PAIN: 0
BACK PAIN: 1
ARTHRALGIAS: 1
NERVOUS/ANXIOUS: 1
NECK PAIN: 1

## 2024-09-09 NOTE — PATIENT INSTRUCTIONS
Thank you for choosing [facility]  for your health care needs.  If you have any questions, concerns or medical needs, please feel free to contact our office at [phone].    Please ensure you complete your blood work one week before the next scheduled appointment.    For 1mg Dexamethasone suppression test where Dexamethasone is taken at 11pm and labs are obtained at 8am the following morning  Counseled that adrenal adenomas assessed in 3 major ways - clinically, biochemically and radiographically   Counseled that adrenal adenomas will be followed on an annual basis radiographically for three years  Counseled that adrenal adenomas will be followed on an annual basis biochemically for five years   For follow up in 1 year unless abnormal labs are found

## 2024-09-09 NOTE — PROGRESS NOTES
"Subjective   Nina Oquendo is a 74 y.o. female who presents for follow up.    The patient underwent a right adrenalectomy as well as right nephrectomy due to urothelial carcinoma on January 26, 2023. Surgical pathology revealed a clear and compact cell cortical adenoma arising in the context of cortical nodular disease with atrophy of nontumorous cortex.     She had a CT scan of the abdomen on September 19, 2022 which revealed a right 4.1 x 3.1cm right adrenal mass. On the left she had a 2.8 x 1.5cm adrenal mass.     Tobacco use - 1ppd. She also admits to using marijuana nightly.    She is for a hip replacemnet but her A1C must be under 7%. She gets steroid shots, with the last being one month ago.      She fell two months ago.    Symptoms are as listed below:  Energy - fatigued   Weight changes  - gained  Muscle weakness - admits; ambulates with a cane  Easy bruising - admits due to insulin shots    Sleep -Uses marijuana in order to sleep   Recurrent infections - denies  Menstrual history - underwent DAVID BSO in 1995  Hypertension - admits   Diabetes - admits  Tremors - admits   Palpitations -denies   Bowel movements - typically constipated        Review of Systems   Constitutional:  Positive for fatigue.   HENT:  Positive for tinnitus.    Gastrointestinal:  Positive for constipation. Negative for abdominal pain, diarrhea and nausea.   Musculoskeletal:  Positive for arthralgias, back pain and neck pain.   Neurological:  Positive for dizziness, weakness and light-headedness.   Psychiatric/Behavioral:  The patient is nervous/anxious.    All other systems reviewed and are negative.      Objective   Visit Vitals  /80 (BP Location: Left arm, Patient Position: Sitting, BP Cuff Size: Adult)   Pulse 85   Ht 1.626 m (5' 4\")   Wt 58.1 kg (128 lb)   BMI 21.97 kg/m²   OB Status Hysterectomy   Smoking Status Every Day   BSA 1.62 m²       Physical Exam  Vitals and nursing note reviewed.   Constitutional:       General: " She is not in acute distress.     Appearance: Normal appearance. She is normal weight.   HENT:      Head: Normocephalic and atraumatic.      Nose: Nose normal.      Mouth/Throat:      Mouth: Mucous membranes are moist.   Eyes:      Extraocular Movements: Extraocular movements intact.   Cardiovascular:      Rate and Rhythm: Normal rate and regular rhythm.   Pulmonary:      Effort: Pulmonary effort is normal.      Breath sounds: Normal breath sounds.   Musculoskeletal:         General: Normal range of motion.   Skin:     General: Skin is warm.   Neurological:      Mental Status: She is alert and oriented to person, place, and time.   Psychiatric:         Mood and Affect: Mood normal.     Lab Review  Component  Ref Range & Units 12 mo ago  (9/11/23) 1 yr ago  (2/14/23) 1 yr ago  (1/20/23) 1 yr ago  (10/21/22)   Cortisol- AM  5.0 - 20.0 ug/dL 22.8 High  22.8 High  19.7 5.4               Component  Ref Range & Units 12 mo ago  (9/11/23) 1 yr ago  (6/28/23) 1 yr ago  (6/13/23) 1 yr ago  (3/20/23) 1 yr ago  (3/15/23) 1 yr ago  (2/14/23) 1 yr ago  (1/27/23)   Glucose  74 - 99 mg/dL 116 High  152 High  165 High  99 92 82 55 Low Panic  CM   Sodium  136 - 145 mmol/L 142 139 142 140 137 138 137   Potassium  3.5 - 5.3 mmol/L 4.7 4.5 4.4 3.3 Low  3.4 Low  4.2 4.2   Chloride  98 - 107 mmol/L 108 High  106 107 106 103 102 106   Bicarbonate  21 - 32 mmol/L 28 28 29 26 23 23 24   Anion Gap  10 - 20 mmol/L 11 10 10 11 14 17 11   Urea Nitrogen  6 - 23 mg/dL 19 22 17 10 13 12 21   Creatinine  0.50 - 1.05 mg/dL 0.71 0.71 0.71 0.52 0.50 0.52 0.65   GFR Female  >90 mL/min/1.73m2 >90 >90 CM >90 CM >90 CM >90 CM >90 CM >90 C          Component  Ref Range & Units 12 mo ago  (9/11/23) 1 yr ago  (2/14/23) 1 yr ago  (1/20/23)   Adrenocorticotropic Hormone (ACTH)  7.2 - 63.3 pg/mL 9.1 <1.5 Low  CM <1.5 Low  CM           Component  Ref Range & Units 12 mo ago  (9/11/23) 1 yr ago  (1/20/23) 1 yr ago  (10/21/22)   Metanephrine  <0.50 nmol/L <0.20  0.24 CM <0.20 CM        Component  Ref Range & Units 12 mo ago 1 yr ago   DHEA Sulfate  13 - 130 ug/dL 18 45 CM           Component  Ref Range & Units 12 mo ago  (9/11/23) 1 yr ago  (1/20/23) 1 yr ago  (10/21/22)   Renin Activity  ng/mL/hr 0.4 0.5 CM 0.4 CM           Component  Ref Range & Units 12 mo ago  (9/11/23) 1 yr ago  (1/20/23) 1 yr ago  (10/21/22)   Aldosterone  ng/dL 6.4 14.9 CM 9.0 CM     === 12/18/23 ===    CT UROGRAPHY WITH 3D VOLUME RENDERED IMAGING    - Impression -  1.  No residual focal urothelial filling defect or significant  thickening to suggest recurrent/residual neoplastic involvement. No  lymphadenopathy.  2. No CT apparent acute process.  3. Similar appearance of left adrenal gland nodule, likely an adenoma.  4. Additional incidental and chronic findings as above.    Assessment/Plan   74 year old female presents for follow up for a left adrenal adenoma. The patient underwent a right adrenalectomy as well as right nephrectomy due to urothelial carcinoma on January 26, 2023. Surgical pathology revealed a clear and compact cell cortical adenoma arising in the context of cortical nodular disease with atrophy of nontumorous cortex. She has no clinical stigmata to suggest Cushing's syndrome. She has no history to suggest a pheochromocytoma and biochemical testing was negative.     Adenoma of left adrenal gland  For 1mg Dexamethasone suppression test where Dexamethasone is taken at 11pm and labs are obtained at 8am the following morning  Counseled that adrenal adenomas assessed in 3 major ways - clinically, biochemically and radiographically   Counseled that adrenal adenomas will be followed on an annual basis radiographically for three years  Counseled that adrenal adenomas will be followed on an annual basis biochemically for five years   For follow up in 1 year unless abnormal labs are found

## 2024-09-10 NOTE — ASSESSMENT & PLAN NOTE
For 1mg Dexamethasone suppression test where Dexamethasone is taken at 11pm and labs are obtained at 8am the following morning  Counseled that adrenal adenomas assessed in 3 major ways - clinically, biochemically and radiographically   Counseled that adrenal adenomas will be followed on an annual basis radiographically for three years  Counseled that adrenal adenomas will be followed on an annual basis biochemically for five years   For follow up in 1 year unless abnormal labs are found

## 2024-09-11 ENCOUNTER — LAB (OUTPATIENT)
Dept: LAB | Facility: LAB | Age: 74
End: 2024-09-11
Payer: MEDICARE

## 2024-09-11 DIAGNOSIS — D35.02 ADENOMA OF LEFT ADRENAL GLAND: ICD-10-CM

## 2024-09-11 LAB
ANION GAP SERPL CALC-SCNC: 13 MMOL/L (ref 10–20)
BUN SERPL-MCNC: 17 MG/DL (ref 6–23)
CALCIUM SERPL-MCNC: 9.1 MG/DL (ref 8.6–10.3)
CHLORIDE SERPL-SCNC: 106 MMOL/L (ref 98–107)
CO2 SERPL-SCNC: 24 MMOL/L (ref 21–32)
CORTIS SERPL-MCNC: 3.1 UG/DL (ref 2.5–20)
CREAT SERPL-MCNC: 0.57 MG/DL (ref 0.5–1.05)
EGFRCR SERPLBLD CKD-EPI 2021: >90 ML/MIN/1.73M*2
GLUCOSE SERPL-MCNC: 202 MG/DL (ref 74–99)
POTASSIUM SERPL-SCNC: 4.3 MMOL/L (ref 3.5–5.3)
SODIUM SERPL-SCNC: 139 MMOL/L (ref 136–145)

## 2024-09-11 PROCEDURE — 82088 ASSAY OF ALDOSTERONE: CPT

## 2024-09-11 PROCEDURE — 80375 DRUG/SUBSTANCE NOS 1-3: CPT

## 2024-09-11 PROCEDURE — 83835 ASSAY OF METANEPHRINES: CPT

## 2024-09-11 PROCEDURE — 80048 BASIC METABOLIC PNL TOTAL CA: CPT

## 2024-09-11 PROCEDURE — 82533 TOTAL CORTISOL: CPT

## 2024-09-11 PROCEDURE — 84244 ASSAY OF RENIN: CPT

## 2024-09-11 PROCEDURE — 36415 COLL VENOUS BLD VENIPUNCTURE: CPT

## 2024-09-13 LAB — ALDOST SERPL-MCNC: 3.2 NG/DL

## 2024-09-14 LAB — RENIN PLAS-CCNC: <0.1 NG/ML/HR

## 2024-09-15 LAB
ANNOTATION COMMENT IMP: NORMAL
METANEPHS SERPL-SCNC: <0.1 NMOL/L (ref 0–0.49)
NORMETANEPH FREE SERPL-SCNC: 0.45 NMOL/L (ref 0–0.89)

## 2024-09-16 LAB — DEXAMETHASONE SERPL-MCNC: 346.7 NG/DL

## 2024-09-19 ENCOUNTER — TELEPHONE (OUTPATIENT)
Dept: ENDOCRINOLOGY | Facility: CLINIC | Age: 74
End: 2024-09-19

## 2024-09-20 ENCOUNTER — APPOINTMENT (OUTPATIENT)
Dept: UROLOGY | Facility: CLINIC | Age: 74
End: 2024-09-20
Payer: MEDICARE

## 2024-10-15 ENCOUNTER — TELEPHONE (OUTPATIENT)
Dept: PRIMARY CARE | Facility: CLINIC | Age: 74
End: 2024-10-15
Payer: MEDICARE

## 2024-10-15 DIAGNOSIS — E11.9 TYPE 2 DIABETES MELLITUS WITHOUT COMPLICATION, WITH LONG-TERM CURRENT USE OF INSULIN (MULTI): ICD-10-CM

## 2024-10-15 DIAGNOSIS — Z79.4 TYPE 2 DIABETES MELLITUS WITHOUT COMPLICATION, WITH LONG-TERM CURRENT USE OF INSULIN (MULTI): ICD-10-CM

## 2024-10-15 DIAGNOSIS — K21.9 GASTROESOPHAGEAL REFLUX DISEASE WITHOUT ESOPHAGITIS: ICD-10-CM

## 2024-10-15 RX ORDER — ONDANSETRON 4 MG/1
4 TABLET, FILM COATED ORAL EVERY 8 HOURS PRN
Qty: 90 TABLET | Refills: 1 | Status: SHIPPED | OUTPATIENT
Start: 2024-10-15

## 2024-10-15 RX ORDER — BLOOD-GLUCOSE SENSOR
EACH MISCELLANEOUS
Qty: 6 EACH | Refills: 3 | Status: SHIPPED | OUTPATIENT
Start: 2024-10-15 | End: 2024-10-17 | Stop reason: SDUPTHER

## 2024-10-17 ENCOUNTER — TELEPHONE (OUTPATIENT)
Dept: PRIMARY CARE | Facility: CLINIC | Age: 74
End: 2024-10-17
Payer: MEDICARE

## 2024-10-17 DIAGNOSIS — E11.9 TYPE 2 DIABETES MELLITUS WITHOUT COMPLICATION, WITH LONG-TERM CURRENT USE OF INSULIN (MULTI): ICD-10-CM

## 2024-10-17 DIAGNOSIS — Z79.4 TYPE 2 DIABETES MELLITUS WITHOUT COMPLICATION, WITH LONG-TERM CURRENT USE OF INSULIN (MULTI): ICD-10-CM

## 2024-10-17 NOTE — TELEPHONE ENCOUNTER
Spoke to [facility], pharmacist states the rx needs to be Mukund Sensor 3 Plus, but she already changed it and it requires a PA. Without a PA its not covered and costs about $300.     The PA, we did was closed because it was not needed.  I not sure what to do, change the med, then try to PA?   Please advise Thelina

## 2024-10-17 NOTE — TELEPHONE ENCOUNTER
Pt states Rx for Freestyle Mukund 3 sensor was sent to [facility] but they are telling her that there was an upgrade and that this is not the right Rx.  Pt is unsure of what the correct Rx should be and would like us to call  to find out what Rx should be written as.

## 2024-10-18 RX ORDER — BLOOD-GLUCOSE SENSOR
EACH MISCELLANEOUS
Qty: 6 EACH | Refills: 3 | Status: SHIPPED | OUTPATIENT
Start: 2024-10-18

## 2024-10-20 DIAGNOSIS — D35.02 ADENOMA OF LEFT ADRENAL GLAND: Primary | ICD-10-CM

## 2024-10-21 ENCOUNTER — TELEPHONE (OUTPATIENT)
Dept: PRIMARY CARE | Facility: CLINIC | Age: 74
End: 2024-10-21
Payer: MEDICARE

## 2024-10-21 ENCOUNTER — TELEPHONE (OUTPATIENT)
Dept: ENDOCRINOLOGY | Facility: CLINIC | Age: 74
End: 2024-10-21
Payer: MEDICARE

## 2024-10-21 DIAGNOSIS — E11.9 TYPE 2 DIABETES MELLITUS WITHOUT COMPLICATION, WITH LONG-TERM CURRENT USE OF INSULIN (MULTI): ICD-10-CM

## 2024-10-21 DIAGNOSIS — Z79.4 TYPE 2 DIABETES MELLITUS WITHOUT COMPLICATION, WITH LONG-TERM CURRENT USE OF INSULIN (MULTI): ICD-10-CM

## 2024-10-21 RX ORDER — BLOOD-GLUCOSE SENSOR
EACH MISCELLANEOUS
Qty: 6 EACH | Refills: 3 | Status: SHIPPED | OUTPATIENT
Start: 2024-10-21

## 2024-10-21 NOTE — TELEPHONE ENCOUNTER
----- Message from Itz Bella sent at 10/20/2024  3:08 PM EDT -----  Labs have been reviewed.  The cortisol value was not suppressed with the Dexamethasone pill.  All other adrenal values were normal. Please undergo 24 hour urinary collection to assess cortisol value.

## 2024-10-21 NOTE — TELEPHONE ENCOUNTER
Patient states see needs this sent to a different Pharmacy her Ins states [facility]  blood-glucose sensor (FreeStyle Mukund 3 Sensor)- [facility]

## 2024-10-23 ENCOUNTER — LAB (OUTPATIENT)
Dept: LAB | Facility: LAB | Age: 74
End: 2024-10-23
Payer: MEDICARE

## 2024-10-23 DIAGNOSIS — D35.02 ADENOMA OF LEFT ADRENAL GLAND: ICD-10-CM

## 2024-10-23 LAB
COLLECT DURATION TIME SPEC: 24 HRS
CREAT 24H UR-MCNC: 67.9 MG/DL (ref 20–320)
CREAT 24H UR-MRATE: 1.34 G/24 H (ref 0.67–1.59)
SPECIMEN VOL 24H UR: 1975 ML

## 2024-10-23 PROCEDURE — 82530 CORTISOL FREE: CPT

## 2024-10-23 PROCEDURE — 81050 URINALYSIS VOLUME MEASURE: CPT

## 2024-10-23 PROCEDURE — 82570 ASSAY OF URINE CREATININE: CPT

## 2024-10-25 ENCOUNTER — APPOINTMENT (OUTPATIENT)
Dept: UROLOGY | Facility: CLINIC | Age: 74
End: 2024-10-25
Payer: MEDICARE

## 2024-10-26 LAB
ANNOTATION COMMENT IMP: ABNORMAL
COLLECT DURATION TIME SPEC: 24 HR
CORTIS F 24H UR HPLC-MCNC: 21.5 UG/L
CORTIS F 24H UR-MRATE: 42.5 UG/D
CORTIS F/CREAT 24H UR: 30.28 UG/G CRT
CREAT 24H UR-MRATE: 1402 MG/D (ref 500–1400)
CREAT UR-MCNC: 71 MG/DL
SPECIMEN VOL ?TM UR: 1975 ML

## 2024-10-27 NOTE — RESULT ENCOUNTER NOTE
Labs have been reviewed. There is no evidence of urinary excess on 24 hour collection.  For follow up as scheduled.

## 2024-10-28 ENCOUNTER — TELEPHONE (OUTPATIENT)
Dept: ENDOCRINOLOGY | Facility: CLINIC | Age: 74
End: 2024-10-28
Payer: MEDICARE

## 2024-11-01 ENCOUNTER — APPOINTMENT (OUTPATIENT)
Dept: UROLOGY | Facility: CLINIC | Age: 74
End: 2024-11-01
Payer: MEDICARE

## 2024-11-19 ENCOUNTER — LAB (OUTPATIENT)
Dept: LAB | Facility: LAB | Age: 74
End: 2024-11-19
Payer: MEDICARE

## 2024-11-19 DIAGNOSIS — E11.9 TYPE 2 DIABETES MELLITUS WITHOUT COMPLICATION, WITH LONG-TERM CURRENT USE OF INSULIN (MULTI): ICD-10-CM

## 2024-11-19 DIAGNOSIS — Z79.4 TYPE 2 DIABETES MELLITUS WITHOUT COMPLICATION, WITH LONG-TERM CURRENT USE OF INSULIN (MULTI): ICD-10-CM

## 2024-11-19 LAB
ALBUMIN SERPL BCP-MCNC: 4.4 G/DL (ref 3.4–5)
ALP SERPL-CCNC: 91 U/L (ref 33–136)
ALT SERPL W P-5'-P-CCNC: 15 U/L (ref 7–45)
ANION GAP SERPL CALC-SCNC: 12 MMOL/L (ref 10–20)
AST SERPL W P-5'-P-CCNC: 18 U/L (ref 9–39)
BILIRUB SERPL-MCNC: 0.6 MG/DL (ref 0–1.2)
BUN SERPL-MCNC: 24 MG/DL (ref 6–23)
CALCIUM SERPL-MCNC: 9.7 MG/DL (ref 8.6–10.3)
CHLORIDE SERPL-SCNC: 106 MMOL/L (ref 98–107)
CHOLEST SERPL-MCNC: 174 MG/DL (ref 0–199)
CHOLESTEROL/HDL RATIO: 2.8
CO2 SERPL-SCNC: 29 MMOL/L (ref 21–32)
CREAT SERPL-MCNC: 0.73 MG/DL (ref 0.5–1.05)
EGFRCR SERPLBLD CKD-EPI 2021: 88 ML/MIN/1.73M*2
EST. AVERAGE GLUCOSE BLD GHB EST-MCNC: 154 MG/DL
GLUCOSE SERPL-MCNC: 151 MG/DL (ref 74–99)
HBA1C MFR BLD: 7 %
HDLC SERPL-MCNC: 61.4 MG/DL
LDLC SERPL CALC-MCNC: 90 MG/DL
NON HDL CHOLESTEROL: 113 MG/DL (ref 0–149)
POTASSIUM SERPL-SCNC: 5 MMOL/L (ref 3.5–5.3)
PROT SERPL-MCNC: 6.8 G/DL (ref 6.4–8.2)
SODIUM SERPL-SCNC: 142 MMOL/L (ref 136–145)
TRIGL SERPL-MCNC: 115 MG/DL (ref 0–149)
VLDL: 23 MG/DL (ref 0–40)

## 2024-11-26 ENCOUNTER — APPOINTMENT (OUTPATIENT)
Dept: PRIMARY CARE | Facility: CLINIC | Age: 74
End: 2024-11-26
Payer: MEDICARE

## 2024-11-26 VITALS
SYSTOLIC BLOOD PRESSURE: 98 MMHG | OXYGEN SATURATION: 99 % | HEART RATE: 69 BPM | TEMPERATURE: 97.5 F | WEIGHT: 128 LBS | BODY MASS INDEX: 21.97 KG/M2 | DIASTOLIC BLOOD PRESSURE: 64 MMHG

## 2024-11-26 DIAGNOSIS — J44.9 CHRONIC OBSTRUCTIVE PULMONARY DISEASE, UNSPECIFIED COPD TYPE (MULTI): ICD-10-CM

## 2024-11-26 DIAGNOSIS — F32.A DEPRESSIVE DISORDER: ICD-10-CM

## 2024-11-26 DIAGNOSIS — E11.9 TYPE 2 DIABETES MELLITUS WITHOUT COMPLICATION, WITH LONG-TERM CURRENT USE OF INSULIN (MULTI): ICD-10-CM

## 2024-11-26 DIAGNOSIS — I10 BENIGN ESSENTIAL HYPERTENSION: ICD-10-CM

## 2024-11-26 DIAGNOSIS — Z79.4 TYPE 2 DIABETES MELLITUS WITHOUT COMPLICATION, WITH LONG-TERM CURRENT USE OF INSULIN (MULTI): ICD-10-CM

## 2024-11-26 DIAGNOSIS — C68.9 UROTHELIAL CANCER (MULTI): Primary | ICD-10-CM

## 2024-11-26 PROCEDURE — 99214 OFFICE O/P EST MOD 30 MIN: CPT | Performed by: FAMILY MEDICINE

## 2024-11-26 PROCEDURE — 3051F HG A1C>EQUAL 7.0%<8.0%: CPT | Performed by: FAMILY MEDICINE

## 2024-11-26 PROCEDURE — 3074F SYST BP LT 130 MM HG: CPT | Performed by: FAMILY MEDICINE

## 2024-11-26 PROCEDURE — G2211 COMPLEX E/M VISIT ADD ON: HCPCS | Performed by: FAMILY MEDICINE

## 2024-11-26 PROCEDURE — 4010F ACE/ARB THERAPY RXD/TAKEN: CPT | Performed by: FAMILY MEDICINE

## 2024-11-26 PROCEDURE — 3061F NEG MICROALBUMINURIA REV: CPT | Performed by: FAMILY MEDICINE

## 2024-11-26 PROCEDURE — 3078F DIAST BP <80 MM HG: CPT | Performed by: FAMILY MEDICINE

## 2024-11-26 PROCEDURE — 3048F LDL-C <100 MG/DL: CPT | Performed by: FAMILY MEDICINE

## 2024-11-26 PROCEDURE — 1123F ACP DISCUSS/DSCN MKR DOCD: CPT | Performed by: FAMILY MEDICINE

## 2024-11-26 PROCEDURE — 1159F MED LIST DOCD IN RCRD: CPT | Performed by: FAMILY MEDICINE

## 2024-11-26 RX ORDER — DULOXETIN HYDROCHLORIDE 30 MG/1
30 CAPSULE, DELAYED RELEASE ORAL DAILY
Qty: 90 CAPSULE | Refills: 1 | Status: SHIPPED | OUTPATIENT
Start: 2024-11-26 | End: 2025-05-25

## 2024-11-26 RX ORDER — INSULIN LISPRO 100 [IU]/ML
5 INJECTION, SOLUTION INTRAVENOUS; SUBCUTANEOUS
Qty: 13.5 ML | Refills: 3 | Status: SHIPPED | OUTPATIENT
Start: 2024-11-26 | End: 2025-11-21

## 2024-11-26 RX ORDER — PEN NEEDLE, DIABETIC 30 GX3/16"
1 NEEDLE, DISPOSABLE MISCELLANEOUS 3 TIMES DAILY
Qty: 300 EACH | Refills: 3 | Status: SHIPPED | OUTPATIENT
Start: 2024-11-26

## 2024-11-26 RX ORDER — LOSARTAN POTASSIUM 50 MG/1
50 TABLET ORAL DAILY
Qty: 90 TABLET | Refills: 3 | Status: SHIPPED | OUTPATIENT
Start: 2024-11-26 | End: 2025-11-26

## 2024-11-26 ASSESSMENT — ENCOUNTER SYMPTOMS: HYPERTENSION: 1

## 2024-11-26 NOTE — PROGRESS NOTES
Subjective   Patient ID: Nina Oquendo is a 74 y.o. female who presents for Hypertension (Recheck), Hyperlipidemia (Review BW), and GERD.  Hypertension    Hyperlipidemia    GERD          1. DM: a1c was 7.0.  Taking her insulin    2. Lipids: denies chest pain. taking medication.     3. Tobacco use: still smoking    4. HTN: well-controlled with medication .does not measure at home.     5. Adrenal carcinoma: now cancer free.      Patient Active Problem List   Diagnosis    Type 2 diabetes mellitus with diabetic neuropathy, with long-term current use of insulin    Hyperlipidemia    Chronic obstructive lung disease (Multi)    Benign essential hypertension    Essential hypertension    Depressive disorder    Protein-calorie malnutrition, unspecified severity (Multi)    Anxiety disorder, unspecified    Gastroesophageal reflux disease without esophagitis    Urothelial cancer (Multi)    Other specified disorders of adrenal gland    Adenoma of left adrenal gland       Social Connections: Not on file       Current Outpatient Medications on File Prior to Visit   Medication Sig Dispense Refill    atorvastatin (Lipitor) 40 mg tablet Take 1 tablet (40 mg) by mouth once daily. 90 tablet 3    blood sugar diagnostic (Prodigy No Coding) strip Prodigy Pressure Use to check 2x a day 200 strip 0    blood-glucose sensor (FreeStyle Mukund 3 Sensor) device Apply under skin [id number] 6 each 3    DULoxetine (Cymbalta) 30 mg DR capsule Take 1 capsule (30 mg) by mouth once daily. Do not crush or chew. 90 capsule 1    insulin lispro (HumaLOG) 100 unit/mL injection Inject 5 Units under the skin 3 times a day with meals. Take as directed per insulin instructions 13.5 mL 3    losartan (Cozaar) 50 mg tablet Take 1 tablet (50 mg) by mouth once daily. 90 tablet 3    metoprolol succinate XL (Toprol-XL) 50 mg 24 hr tablet Take 1 tablet (50 mg) by mouth once daily. 90 tablet 3    omeprazole (PriLOSEC) 20 mg DR capsule Take 1 capsule (20 mg) by mouth early  "in the morning..      omeprazole OTC (PriLOSEC OTC) 20 mg EC tablet Take 1 tablet (20 mg) by mouth once daily in the morning. Take before meals. 90 tablet 3    ondansetron (Zofran) 4 mg tablet Take 1 tablet (4 mg) by mouth every 8 hours if needed for nausea. 90 tablet 1    pen needle, diabetic 31 gauge x 5/16\" needle 1 each 3 times a day. 300 each 3    dexAMETHasone (Decadron) 1 mg tablet Take 1 tablet at 11pm and obtain labs at 8am the next morning 1 tablet 0     No current facility-administered medications on file prior to visit.        Vitals:    11/26/24 0929   BP: 98/64   Pulse: 69   Temp: 36.4 °C (97.5 °F)   SpO2: 99%     Vitals:    11/26/24 0929   Weight: 58.1 kg (128 lb)       Review of Systems   All other systems reviewed and are negative.      Objective     Physical Exam  Constitutional:       Appearance: Normal appearance. She is well-developed.   HENT:      Head: Atraumatic.   Cardiovascular:      Rate and Rhythm: Normal rate and regular rhythm.      Heart sounds: Normal heart sounds. No murmur heard.  Pulmonary:      Effort: Pulmonary effort is normal.      Breath sounds: Normal breath sounds.   Abdominal:      General: Bowel sounds are normal.      Palpations: Abdomen is soft.   Skin:     General: Skin is warm.   Neurological:      General: No focal deficit present.      Mental Status: She is alert.   Psychiatric:         Mood and Affect: Mood normal.         Lab on 11/19/2024   Component Date Value Ref Range Status    Glucose 11/19/2024 151 (H)  74 - 99 mg/dL Final    Sodium 11/19/2024 142  136 - 145 mmol/L Final    Potassium 11/19/2024 5.0  3.5 - 5.3 mmol/L Final    Chloride 11/19/2024 106  98 - 107 mmol/L Final    Bicarbonate 11/19/2024 29  21 - 32 mmol/L Final    Anion Gap 11/19/2024 12  10 - 20 mmol/L Final    Urea Nitrogen 11/19/2024 24 (H)  6 - 23 mg/dL Final    Creatinine 11/19/2024 0.73  0.50 - 1.05 mg/dL Final    eGFR 11/19/2024 88  >60 mL/min/1.73m*2 Final    Calculations of estimated GFR " are performed using the 2021 CKD-EPI Study Refit equation without the race variable for the IDMS-Traceable creatinine methods.  https://jasn.asnjournals.org/content/early/2021/09/22/ASN.8949965729    Calcium 11/19/2024 9.7  8.6 - 10.3 mg/dL Final    Albumin 11/19/2024 4.4  3.4 - 5.0 g/dL Final    Alkaline Phosphatase 11/19/2024 91  33 - 136 U/L Final    Total Protein 11/19/2024 6.8  6.4 - 8.2 g/dL Final    AST 11/19/2024 18  9 - 39 U/L Final    Bilirubin, Total 11/19/2024 0.6  0.0 - 1.2 mg/dL Final    ALT 11/19/2024 15  7 - 45 U/L Final    Patients treated with Sulfasalazine may generate falsely decreased results for ALT.    Cholesterol 11/19/2024 174  0 - 199 mg/dL Final          Age      Desirable   Borderline High   High     0-19 Y     0 - 169       170 - 199     >/= 200    20-24 Y     0 - 189       190 - 224     >/= 225         >24 Y     0 - 199       200 - 239     >/= 240   **All ranges are based on fasting samples. Specific   therapeutic targets will vary based on patient-specific   cardiac risk.    Pediatric guidelines reference:Pediatrics 2011, 128(S5).Adult guidelines reference: NCEP ATPIII Guidelines,MORIAH 2001, 258:2486-97    Venipuncture immediately after or during the administration of Metamizole may lead to falsely low results. Testing should be performed immediately prior to Metamizole dosing.    HDL-Cholesterol 11/19/2024 61.4  mg/dL Final      Age       Very Low   Low     Normal    High    0-19 Y    < 35      < 40     40-45     ----  20-24 Y    ----     < 40      >45      ----        >24 Y      ----     < 40     40-60      >60      Cholesterol/HDL Ratio 11/19/2024 2.8   Final      Ref Values  Desirable  < 3.4  High Risk  > 5.0    LDL Calculated 11/19/2024 90  <=99 mg/dL Final                                Near   Borderline      AGE      Desirable  Optimal    High     High     Very High     0-19 Y     0 - 109     ---    110-129   >/= 130     ----    20-24 Y     0 - 119     ---    120-159   >/= 160      ----      >24 Y     0 -  99   100-129  130-159   160-189     >/=190      VLDL 11/19/2024 23  0 - 40 mg/dL Final    Triglycerides 11/19/2024 115  0 - 149 mg/dL Final    Age              Desirable        Borderline         High        Very High  SEX:B           mg/dL             mg/dL               mg/dL      mg/dL  <=14D                       ----               ----        ----  15D-365D                    ----               ----        ----  1Y-9Y           0-74               75-99             >=100       ----  10Y-19Y        0-89                            >=130       ----  20Y-24Y        0-114             115-149             >=150      ----  >= 25Y         0-149             150-199             200-499    >=500      Venipuncture immediately after or during the administration of Metamizole may lead to falsely low results. Testing should be performed immediately prior to Metamizole dosing.    Non HDL Cholesterol 11/19/2024 113  0 - 149 mg/dL Final          Age       Desirable   Borderline High   High     Very High     0-19 Y     0 - 119       120 - 144     >/= 145    >/= 160    20-24 Y     0 - 149       150 - 189     >/= 190      ----         >24 Y    30 mg/dL above LDL Cholesterol goal      Hemoglobin A1C 11/19/2024 7.0 (H)  See comment % Final    Estimated Average Glucose 11/19/2024 154  Not Established mg/dL Final   Lab on 10/23/2024   Component Date Value Ref Range Status    Collection period 10/23/2024 24  hrs Final    Urine Volume 10/23/2024 1,975  mL Final    Creatinine, Urine 10/23/2024 67.9  20.0 - 320.0 mg/dL Final    Creatinine, 24 Hour Urine 10/23/2024 1.34  0.67 - 1.59 g/24 h Final    Cortisol/Creatinine Ratio 10/23/2024 30.28  ug/g CRT Final    Reference Interval: Cortisol ug/g crt    Female    Prepubertal: Less than 25 ug/g crt  18 years and older: Less than 24 ug/g crt  Pregnancy: Less than 59 ug/g crt    Male    Prepubertal: Less than 25 ug/g crt  18 years and older: Less than 32  "ug/g crt    Cortisol UR Free Per Volume 10/23/2024 21.50  ug/L Final    Creatinine, 24H Ur 10/23/2024 1402 (H)  500 - 1400 mg/d Final    Collection Interval, Ur 10/23/2024 24  hr Final      Per 24h calculations are provided to aid interpretation for   collections with a duration of 24 hours and an average daily urine   volume. For specimens with notable deviations in collection time   or volume, ratios of analytes to a corresponding urine creatinine   concentration may assist in result interpretation.    Total Volume 10/23/2024 1975  mL Final    Creatinine, Urine - per volume 10/23/2024 71  mg/dL Final    Cortisol UR Free Per 24H 10/23/2024 42.5  <=45.0 ug/d Final    Cortisol, Urine Intepretation 10/23/2024 See Note   Final    INTERPRETIVE INFORMATION: Cortisol Urine Free by LC-MS/MS    Access complete set of age- and/or gender-specific reference   intervals for this test in the [facility] Test Directory   ([employer]).    This test was developed and its performance characteristics   determined by [facility]. It has not been cleared or   approved by the [place] Food and Drug Administration. This test was   performed in a CLIA certified laboratory and is intended for   clinical purposes.  Performed By: [facility]  [address] [place] [id number]  : Mateo Oliveros MD, PhD  IA Number: [id number]       Assessment/Plan   Problem List Items Addressed This Visit             ICD-10-CM    Benign essential hypertension I10    Relevant Medications    losartan (Cozaar) 50 mg tablet    Depressive disorder F32.A    Relevant Medications    DULoxetine (Cymbalta) 30 mg DR capsule     Other Visit Diagnoses         Codes    Type 2 diabetes mellitus without complication, with long-term current use of insulin (Multi)     E11.9, Z79.4    Relevant Medications    insulin lispro (HumaLOG) 100 unit/mL injection    pen needle, diabetic 31 gauge x 5/16\" needle          Doing well.  Refilled " meds.  Follow up in 4 mo.

## 2024-12-03 ENCOUNTER — HOSPITAL ENCOUNTER (OUTPATIENT)
Dept: RADIOLOGY | Facility: CLINIC | Age: 74
Discharge: HOME | End: 2024-12-03
Payer: MEDICARE

## 2024-12-03 ENCOUNTER — PREP FOR PROCEDURE (OUTPATIENT)
Dept: ORTHOPEDIC SURGERY | Facility: CLINIC | Age: 74
End: 2024-12-03

## 2024-12-03 ENCOUNTER — APPOINTMENT (OUTPATIENT)
Dept: ORTHOPEDIC SURGERY | Facility: CLINIC | Age: 74
End: 2024-12-03
Payer: MEDICARE

## 2024-12-03 VITALS — HEIGHT: 64 IN | WEIGHT: 127.6 LBS | BODY MASS INDEX: 21.78 KG/M2

## 2024-12-03 DIAGNOSIS — M16.11 PRIMARY OSTEOARTHRITIS OF RIGHT HIP: ICD-10-CM

## 2024-12-03 PROCEDURE — 3008F BODY MASS INDEX DOCD: CPT | Performed by: ORTHOPAEDIC SURGERY

## 2024-12-03 PROCEDURE — 1125F AMNT PAIN NOTED PAIN PRSNT: CPT | Performed by: ORTHOPAEDIC SURGERY

## 2024-12-03 PROCEDURE — 3061F NEG MICROALBUMINURIA REV: CPT | Performed by: ORTHOPAEDIC SURGERY

## 2024-12-03 PROCEDURE — 1159F MED LIST DOCD IN RCRD: CPT | Performed by: ORTHOPAEDIC SURGERY

## 2024-12-03 PROCEDURE — 1123F ACP DISCUSS/DSCN MKR DOCD: CPT | Performed by: ORTHOPAEDIC SURGERY

## 2024-12-03 PROCEDURE — 3051F HG A1C>EQUAL 7.0%<8.0%: CPT | Performed by: ORTHOPAEDIC SURGERY

## 2024-12-03 PROCEDURE — 3048F LDL-C <100 MG/DL: CPT | Performed by: ORTHOPAEDIC SURGERY

## 2024-12-03 PROCEDURE — 73502 X-RAY EXAM HIP UNI 2-3 VIEWS: CPT | Mod: RIGHT SIDE | Performed by: RADIOLOGY

## 2024-12-03 PROCEDURE — 99214 OFFICE O/P EST MOD 30 MIN: CPT | Performed by: ORTHOPAEDIC SURGERY

## 2024-12-03 PROCEDURE — 73502 X-RAY EXAM HIP UNI 2-3 VIEWS: CPT | Mod: RT

## 2024-12-03 PROCEDURE — 4010F ACE/ARB THERAPY RXD/TAKEN: CPT | Performed by: ORTHOPAEDIC SURGERY

## 2024-12-03 ASSESSMENT — PAIN - FUNCTIONAL ASSESSMENT: PAIN_FUNCTIONAL_ASSESSMENT: 0-10

## 2024-12-03 ASSESSMENT — PAIN SCALES - GENERAL: PAINLEVEL_OUTOF10: 7

## 2024-12-03 NOTE — PROGRESS NOTES
ORTHOPEDIC FOLLOW UP      ============================  IMPRESSION/PLAN:  ============================   1.  Primary osteoarthritis, right hip      PLAN:  Discussed with patient findings above.  Currently she is severe arthritic changes noted on x-rays which was reviewed with her.  Her diabetic control is much better now and would like to proceed with surgery. Nina Oquendo has radiographic and physical exam evidence of degenerative joint disease and wishes to pursue surgery. The patient appears to have sufficient symptoms to warrant surgical intervention and is an appropriate candidate for  right Total Hip Arthroplasty as evidenced by six months of unsuccessful non-operative treatment as outlined in the HPI, progressive symptoms including pain impacting sleep or causing fatigue, pain impacting work, pain worsened with weight bearing, and pain limiting ability to stay fit and healthy.     We had a lengthy discussion regarding the risk and benefit of surgery, the alternatives, limitations, and personnel involved. The include but are not limited to infection, persistent pain, instability, nerve injury, blood clots, and medical complications. We also discussed the pre-operative course, surgery itself, and rehabilitation. Perioperative blood management and transfusion issues were discussed, and options clearly outlined. The patient consented to the use of allogenic blood if medically necessary.     The patient has elected to schedule surgery at this time or intents to call the office with a surgical date. Shared decision making occurred while obtaining informed consent. The patient with be scheduled for a pre-operative education class. The patient will be ordered appropriate preoperative labs to be completed for preadmission testing.     Robotic Assisted Surgery: No    - Preoperative Consults: PAT Clearance   - Disposition: Extended recovery  - Anesthesia Plan: Spinal, local  - Implants: Loraine  - Physical Therapy  Plan: Home  - [id number]: Yes  - Surgical Approach: Direct superior  - DVT PPx: ASA    Risk Assessment for Post-Op Antibiotics   - Diabetes Mellitus: yes  - Current tobacco use      I hereby indicate that these comorbidities may have a detrimental effect on this arthroplasty.   - Smoking [Z72.0 Tobacco use]      Nina Oquendo presents today for follow up of the above condition. Her last A1C was 7.0 as of 11/19/2024. She would like to discuss Sx. XR done today.    FUNCTIONAL STATUS: occasionally limited.  AMBULATORY STATUS: Independent community ambulation with canes/crutches  PREVIOUS TREATMENTS: NSAID's: Motrin with mild improvment, still taking  Just Tylenol as needed   HISTORY OF SURGERY ON AFFECTED HIP(S): No   BACK PAIN REPORTED: Yes     Review of Systems:   Constitutional: See HPI for pain assessment, No significant weight loss, recent trauma. Denies fevers/chills  Cardiovascular: No chest pain, shortness of breath  Respiratory: No difficulty breathing, cough  Gastrointestinal: No nausea, vomiting, diarrhea, constipation  Musculoskeletal: Noted in HPI, no arthralgias   Integumentary: No rashes, easy bruising, redness   Neurological: no numbness or tingling in extremities, no gait disturbances     Patient Active Problem List   Diagnosis    Type 2 diabetes mellitus with diabetic neuropathy, with long-term current use of insulin    Hyperlipidemia    Chronic obstructive lung disease (Multi)    Benign essential hypertension    Essential hypertension    Depressive disorder    Protein-calorie malnutrition, unspecified severity (Multi)    Anxiety disorder, unspecified    Gastroesophageal reflux disease without esophagitis    Urothelial cancer (Multi)    Other specified disorders of adrenal gland    Adenoma of left adrenal gland    Primary osteoarthritis of right hip       Past Medical History:   Diagnosis Date    Anxiety     Arthritis     COPD (chronic obstructive pulmonary disease) (Multi)     Depression     GERD  (gastroesophageal reflux disease)     Hyperlipidemia     Hypertension     Type 2 diabetes mellitus without complications (Multi) 12/04/2013    Diabetes mellitus        Allergies   Allergen Reactions    Adhesive Rash    Codeine Nausea/vomiting    Metformin Unknown        Past Surgical History:   Procedure Laterality Date    ADRENAL GLAND SURGERY Right     removed for cancer 2023    CATARACT EXTRACTION      OTHER SURGICAL HISTORY  08/31/2020    Hysterectomy total    OTHER SURGICAL HISTORY  08/31/2020    Cleft palate repair        Family History   Problem Relation Name Age of Onset    Liver cancer Mother      Heart attack Father      Diabetes type II Brother      Diabetes type II Brother          Social History     Socioeconomic History    Marital status: Single     Spouse name: Not on file    Number of children: Not on file    Years of education: Not on file    Highest education level: Not on file   Occupational History    Not on file   Tobacco Use    Smoking status: Every Day     Current packs/day: 1.00     Average packs/day: 1 pack/day for 30.0 years (30.0 ttl pk-yrs)     Types: Cigarettes    Smokeless tobacco: Never   Vaping Use    Vaping status: Never Used   Substance and Sexual Activity    Alcohol use: Not Currently    Drug use: Not Currently    Sexual activity: Not on file   Other Topics Concern    Not on file   Social History Narrative    Not on file     Social Drivers of Health     Financial Resource Strain: Not on file   Food Insecurity: Not on file   Transportation Needs: Not on file   Physical Activity: Not on file   Stress: Not on file   Social Connections: Not on file   Intimate Partner Violence: Not on file   Housing Stability: Not on file        CURRENT MEDICATIONS:   Current Outpatient Medications   Medication Sig Dispense Refill    atorvastatin (Lipitor) 40 mg tablet Take 1 tablet (40 mg) by mouth once daily. 90 tablet 3    blood sugar diagnostic (Prodigy No Coding) strip Prodigy Pressure Use to check  "2x a day 200 strip 0    blood-glucose sensor (FreeStyle Mukund 3 Sensor) device Apply under skin [id number] 6 each 3    dexAMETHasone (Decadron) 1 mg tablet Take 1 tablet at 11pm and obtain labs at 8am the next morning 1 tablet 0    DULoxetine (Cymbalta) 30 mg DR capsule Take 1 capsule (30 mg) by mouth once daily. Do not crush or chew. 90 capsule 1    insulin lispro (HumaLOG) 100 unit/mL injection Inject 5 Units under the skin 3 times daily (morning, midday, late afternoon). Take as directed per insulin instructions 13.5 mL 3    losartan (Cozaar) 50 mg tablet Take 1 tablet (50 mg) by mouth once daily. 90 tablet 3    metoprolol succinate XL (Toprol-XL) 50 mg 24 hr tablet Take 1 tablet (50 mg) by mouth once daily. 90 tablet 3    omeprazole (PriLOSEC) 20 mg DR capsule Take 1 capsule (20 mg) by mouth early in the morning..      omeprazole OTC (PriLOSEC OTC) 20 mg EC tablet Take 1 tablet (20 mg) by mouth once daily in the morning. Take before meals. 90 tablet 3    ondansetron (Zofran) 4 mg tablet Take 1 tablet (4 mg) by mouth every 8 hours if needed for nausea. 90 tablet 1    pen needle, diabetic 31 gauge x 5/16\" needle 1 each 3 times a day. 300 each 3     No current facility-administered medications for this visit.        =================================  EXAM  =================================  GENERAL: A/Ox3, NAD. Appears healthy, well nourished  PSYCHIATRIC: Mood stable, appropriate memory recall  EYES: EOM intact, no scleral icterus  CARDIAC: regular rate  LUNGS: Breathing non-labored  SKIN: no erythema, rashes, or ecchymoses     MUSCULOSKELETAL:  Laterality: right Hip Exam  - ROM, Extension: full, no flexion contracture  - Strength: Abduction 5/5 against resitance, Flexion 5/5  - Palpation: mild TTP along greater trochanter  - Log roll/IR exam: painful and limited motion. Pain with hip flexion past 90 degrees and internal rotation  - Straight leg raise: negative  - EHL/PF/DF motor intact  - Gait: antalgic to arthritic " side, negative Trendelenburg gait   - Special Tests: none performed    NEUROVASCULAR:  - Neurovascular Status: sensation intact to light touch distally  - Capillary refill brisk at extremities, Bilateral dorsalis pedis pulse 2+     Body mass index is 21.9 kg/m².      IMAGING: Previous x-rays of right hip and pelvis reviewed which demonstrate severe arthritis of the right hip with complete joint space narrowing, subchondral sclerosis, subchondral cystic change and marginal osteophytic. X-rays were personally reviewed by me.  Radiology reports were reviewed by me as well, if available at the time.        Harshal Sanchez DO  Attending Surgeon  Joint Replacement and Adult Reconstructive Surgery  [place], [place]

## 2024-12-11 ENCOUNTER — TELEPHONE (OUTPATIENT)
Dept: PRIMARY CARE | Facility: CLINIC | Age: 74
End: 2024-12-11
Payer: MEDICARE

## 2024-12-11 NOTE — TELEPHONE ENCOUNTER
Pt states [facility] recommended Dr. Sanchez for hip surgery. Pt saw Dr. Sanchez and she is scheduled for hip surgery on 2/6. Just an FYI. Thanks, CG

## 2024-12-20 ENCOUNTER — APPOINTMENT (OUTPATIENT)
Dept: UROLOGY | Facility: CLINIC | Age: 74
End: 2024-12-20
Payer: MEDICARE

## 2024-12-20 VITALS
HEIGHT: 65 IN | SYSTOLIC BLOOD PRESSURE: 119 MMHG | BODY MASS INDEX: 21.16 KG/M2 | DIASTOLIC BLOOD PRESSURE: 74 MMHG | HEART RATE: 64 BPM | WEIGHT: 127 LBS

## 2024-12-20 DIAGNOSIS — C67.9 MALIGNANT NEOPLASM OF URINARY BLADDER, UNSPECIFIED SITE (MULTI): Primary | ICD-10-CM

## 2024-12-20 LAB
POC APPEARANCE, URINE: CLEAR
POC BILIRUBIN, URINE: NEGATIVE
POC BLOOD, URINE: NEGATIVE
POC COLOR, URINE: YELLOW
POC GLUCOSE, URINE: NEGATIVE MG/DL
POC KETONES, URINE: NEGATIVE MG/DL
POC LEUKOCYTES, URINE: ABNORMAL
POC NITRITE,URINE: POSITIVE
POC PH, URINE: 6.5 PH
POC PROTEIN, URINE: ABNORMAL MG/DL
POC SPECIFIC GRAVITY, URINE: 1.02
POC UROBILINOGEN, URINE: 0.2 EU/DL

## 2024-12-20 PROCEDURE — 88112 CYTOPATH CELL ENHANCE TECH: CPT

## 2024-12-20 PROCEDURE — 88112 CYTOPATH CELL ENHANCE TECH: CPT | Performed by: PATHOLOGY

## 2024-12-20 NOTE — ASSESSMENT & PLAN NOTE
Per AUA guidelines in setting of kidney-sparing disease, we will continue on surveillance with repeat imaging and cysto in one year. The patient understands and agrees with this plan.     PLAN:  Cystoscopy in one year   Cytology today   Referral to urogyn NP for urinary incontinence   Advised to do CT urogram ordered last visit now

## 2024-12-20 NOTE — PROGRESS NOTES
Subjective    Nina Oquendo is a 74 y.o. female  2 years s/p R sided proximal urethrectomy, ureteroureterostomy and adrenalectomy for bilateral adrenal tumors and LG upper tract urothelial carcinoma here cystoscopy.     She complains of night time leakages. She notes she cannot feel it at times. She wears one pad during the day and changes it once. She sometimes leaks at day when she does not know it.      CT urography 12/2023 was negative. Endoscopy done for surveillance of R upper urinary tract was negative for recurrence of disease locally. Cytology was negative.      Her upper tract bx was LG pTa but incompletely removed due to limitations with UTC and size of primary lesion.    She had not done recent CT urogram.       Review of Systems    All systems were reviewed. Anything negative was noted in the HPI.    Objective   Physical Exam  Gen: No acute distress      Psych: Alert and oriented x3      Neuro:  Normal ROM     Resp: Nonlabored respirations      CV: Regular rate and rhythm      Abd: S, NT, ND.     : Deferred     Skin: Warm, dry and intact without rashes      Lymphatics: No peripheral edema           Past Medical History:   Diagnosis Date    Anxiety     Arthritis     COPD (chronic obstructive pulmonary disease) (Multi)     Depression     GERD (gastroesophageal reflux disease)     Hyperlipidemia     Hypertension     Type 2 diabetes mellitus without complications (Multi) 12/04/2013    Diabetes mellitus         Past Surgical History:   Procedure Laterality Date    ADRENAL GLAND SURGERY Right     removed for cancer 2023    CATARACT EXTRACTION      OTHER SURGICAL HISTORY  08/31/2020    Hysterectomy total    OTHER SURGICAL HISTORY  08/31/2020    Cleft palate repair     Patient ID: Nina Oquendo is a 74 y.o. female.    Cystoscopy    Date/Time: 12/20/2024 2:51 PM    Performed by: Bjorn Ling MD MPH  Authorized by: Bjorn Ling MD MPH    Procedure - Bladder Cystoscopy:     Procedure details:  cystoscopy    Post-procedure:     Patient tolerance: Patient tolerated the procedure well with no immediate complications      Comments:      No evidence of reoccurrence.         Assessment & Plan  Urothelial cancer (Multi)           Malignant neoplasm of urinary bladder, unspecified site (Multi)  Per AUA guidelines in setting of kidney-sparing disease, we will continue on surveillance with repeat imaging and cysto in one year. The patient understands and agrees with this plan. She needs to get her imaging this year.  We again spoke about smoking cessation.    PLAN:  Cystoscopy in one year   Cytology today   Referral to urogyn NP for urinary incontinence   Advised to do CT urogram ordered last visit now     Orders:    POCT UA Automated manually resulted    Referral to Urology; Future    Cytology Consultation (Non-Gynecologic); Future    Cytology Consultation (Non-Gynecologic)                                 Scribe Attestation  By signing my name below, ILadonna Scribe   attest that this documentation has been prepared under the direction and in the presence of Bjorn Ling MD MPH

## 2024-12-20 NOTE — ASSESSMENT & PLAN NOTE
Per AUA guidelines in setting of kidney-sparing disease, we will continue on surveillance with repeat imaging and cysto in one year. The patient understands and agrees with this plan. She needs to get her imaging this year.  We again spoke about smoking cessation.    PLAN:  Cystoscopy in one year   Cytology today   Referral to urogyn NP for urinary incontinence   Advised to do CT urogram ordered last visit now     Orders:    POCT UA Automated manually resulted    Referral to Urology; Future    Cytology Consultation (Non-Gynecologic); Future    Cytology Consultation (Non-Gynecologic)

## 2025-01-06 ENCOUNTER — TELEPHONE (OUTPATIENT)
Dept: ORTHOPEDIC SURGERY | Facility: CLINIC | Age: 75
End: 2025-01-06
Payer: MEDICARE

## 2025-01-06 NOTE — TELEPHONE ENCOUNTER
Patient is scheduled for R MARCELINO on 2/5/25. She is not scheduled for her joint replacement class yet. Are we scheduling those for people? Please advise

## 2025-01-06 NOTE — TELEPHONE ENCOUNTER
She is scheduled for her total joint class on 2/3/25. She was given this information while in Preadmission testing.

## 2025-01-08 DIAGNOSIS — F32.A DEPRESSIVE DISORDER: ICD-10-CM

## 2025-01-08 NOTE — TELEPHONE ENCOUNTER
Pt called asking for rx refill on:    Duloxetine     Send to:    [id number] - [place], [place] - [address]     Last OV: 11/26/24  Next OV: 03/26/25

## 2025-01-09 RX ORDER — DULOXETIN HYDROCHLORIDE 30 MG/1
30 CAPSULE, DELAYED RELEASE ORAL DAILY
Qty: 90 CAPSULE | Refills: 1 | Status: SHIPPED | OUTPATIENT
Start: 2025-01-09 | End: 2025-07-08

## 2025-01-15 RX ORDER — CHLORHEXIDINE GLUCONATE ORAL RINSE 1.2 MG/ML
SOLUTION DENTAL
Qty: 120 ML | Refills: 0 | Status: SHIPPED | OUTPATIENT
Start: 2025-01-15

## 2025-01-17 ENCOUNTER — PRE-ADMISSION TESTING (OUTPATIENT)
Dept: PREADMISSION TESTING | Facility: HOSPITAL | Age: 75
End: 2025-01-17
Payer: MEDICARE

## 2025-01-17 ENCOUNTER — HOSPITAL ENCOUNTER (OUTPATIENT)
Dept: RADIOLOGY | Facility: HOSPITAL | Age: 75
Discharge: HOME | End: 2025-01-17
Payer: MEDICARE

## 2025-01-17 VITALS
TEMPERATURE: 98.6 F | DIASTOLIC BLOOD PRESSURE: 89 MMHG | WEIGHT: 127.8 LBS | BODY MASS INDEX: 21.29 KG/M2 | SYSTOLIC BLOOD PRESSURE: 132 MMHG | HEART RATE: 86 BPM | HEIGHT: 65 IN | RESPIRATION RATE: 16 BRPM | OXYGEN SATURATION: 98 %

## 2025-01-17 DIAGNOSIS — Z01.818 PRE-OP EVALUATION: ICD-10-CM

## 2025-01-17 DIAGNOSIS — Z01.818 PRE-OP EVALUATION: Primary | ICD-10-CM

## 2025-01-17 DIAGNOSIS — E11.40 TYPE 2 DIABETES MELLITUS WITH DIABETIC NEUROPATHY, WITH LONG-TERM CURRENT USE OF INSULIN: ICD-10-CM

## 2025-01-17 DIAGNOSIS — J44.9 CHRONIC OBSTRUCTIVE PULMONARY DISEASE, UNSPECIFIED COPD TYPE (MULTI): ICD-10-CM

## 2025-01-17 DIAGNOSIS — C68.9 UROTHELIAL CANCER (MULTI): ICD-10-CM

## 2025-01-17 DIAGNOSIS — Z79.4 TYPE 2 DIABETES MELLITUS WITH DIABETIC NEUROPATHY, WITH LONG-TERM CURRENT USE OF INSULIN: ICD-10-CM

## 2025-01-17 DIAGNOSIS — M16.11 PRIMARY OSTEOARTHRITIS OF RIGHT HIP: ICD-10-CM

## 2025-01-17 DIAGNOSIS — I10 ESSENTIAL HYPERTENSION: ICD-10-CM

## 2025-01-17 LAB
ABO GROUP (TYPE) IN BLOOD: NORMAL
ANION GAP SERPL CALC-SCNC: 13 MMOL/L (ref 10–20)
ANTIBODY SCREEN: NORMAL
BUN SERPL-MCNC: 19 MG/DL (ref 6–23)
CALCIUM SERPL-MCNC: 9 MG/DL (ref 8.6–10.3)
CHLORIDE SERPL-SCNC: 102 MMOL/L (ref 98–107)
CO2 SERPL-SCNC: 25 MMOL/L (ref 21–32)
CREAT SERPL-MCNC: 0.56 MG/DL (ref 0.5–1.05)
EGFRCR SERPLBLD CKD-EPI 2021: >90 ML/MIN/1.73M*2
ERYTHROCYTE [DISTWIDTH] IN BLOOD BY AUTOMATED COUNT: 12.4 % (ref 11.5–14.5)
GLUCOSE SERPL-MCNC: 117 MG/DL (ref 74–99)
HCT VFR BLD AUTO: 42.8 % (ref 36–46)
HGB BLD-MCNC: 14 G/DL (ref 12–16)
MCH RBC QN AUTO: 31.9 PG (ref 26–34)
MCHC RBC AUTO-ENTMCNC: 32.7 G/DL (ref 32–36)
MCV RBC AUTO: 98 FL (ref 80–100)
NRBC BLD-RTO: 0 /100 WBCS (ref 0–0)
PLATELET # BLD AUTO: 268 X10*3/UL (ref 150–450)
POTASSIUM SERPL-SCNC: 4.6 MMOL/L (ref 3.5–5.3)
RBC # BLD AUTO: 4.39 X10*6/UL (ref 4–5.2)
RH FACTOR (ANTIGEN D): NORMAL
SODIUM SERPL-SCNC: 135 MMOL/L (ref 136–145)
WBC # BLD AUTO: 9.9 X10*3/UL (ref 4.4–11.3)

## 2025-01-17 PROCEDURE — 2550000001 HC RX 255 CONTRASTS: Performed by: STUDENT IN AN ORGANIZED HEALTH CARE EDUCATION/TRAINING PROGRAM

## 2025-01-17 PROCEDURE — 87081 CULTURE SCREEN ONLY: CPT | Mod: PORLAB

## 2025-01-17 PROCEDURE — 36415 COLL VENOUS BLD VENIPUNCTURE: CPT

## 2025-01-17 PROCEDURE — 86901 BLOOD TYPING SEROLOGIC RH(D): CPT

## 2025-01-17 PROCEDURE — 85027 COMPLETE CBC AUTOMATED: CPT

## 2025-01-17 PROCEDURE — 80048 BASIC METABOLIC PNL TOTAL CA: CPT

## 2025-01-17 PROCEDURE — 76377 3D RENDER W/INTRP POSTPROCES: CPT

## 2025-01-17 PROCEDURE — 71046 X-RAY EXAM CHEST 2 VIEWS: CPT

## 2025-01-17 RX ADMIN — IOHEXOL 75 ML: 350 INJECTION, SOLUTION INTRAVENOUS at 10:10

## 2025-01-17 ASSESSMENT — ENCOUNTER SYMPTOMS
RESPIRATORY NEGATIVE: 1
PSYCHIATRIC NEGATIVE: 1
CONSTITUTIONAL NEGATIVE: 1
EYES NEGATIVE: 1
MYALGIAS: 1
GASTROINTESTINAL NEGATIVE: 1
ALLERGIC/IMMUNOLOGIC NEGATIVE: 1
ARTHRALGIAS: 1
HEMATOLOGIC/LYMPHATIC NEGATIVE: 1
CARDIOVASCULAR NEGATIVE: 1
ENDOCRINE NEGATIVE: 1

## 2025-01-17 ASSESSMENT — PAIN - FUNCTIONAL ASSESSMENT: PAIN_FUNCTIONAL_ASSESSMENT: 0-10

## 2025-01-17 ASSESSMENT — PAIN SCALES - GENERAL: PAINLEVEL_OUTOF10: 8

## 2025-01-17 ASSESSMENT — LIFESTYLE VARIABLES: SMOKING_STATUS: SMOKER

## 2025-01-17 NOTE — H&P (VIEW-ONLY)
History Of Present Illness  Nina Oquendo is a 74 y.o. female presenting with right hip pain, OA, painful gait. Scheduled for right total hip arthroplasty under general/spinal anesthesia per Dr. Sanchez on 2/5/25.      Past Medical History  Past Medical History:   Diagnosis Date    Anxiety     Arthritis     COPD (chronic obstructive pulmonary disease) (Multi)     Depression     GERD (gastroesophageal reflux disease)     Hyperlipidemia     Hypertension     Type 2 diabetes mellitus without complications (Multi) 12/04/2013    Diabetes mellitus       Surgical History  Past Surgical History:   Procedure Laterality Date    ADRENAL GLAND SURGERY Right     removed for cancer 2023    CATARACT EXTRACTION      OTHER SURGICAL HISTORY  08/31/2020    Hysterectomy total    OTHER SURGICAL HISTORY  08/31/2020    Cleft palate repair        Social History  She reports that she has been smoking cigarettes. She has a 30 pack-year smoking history. She has never used smokeless tobacco. She reports current drug use. Drug: Marijuana. She reports that she does not drink alcohol.    Family History  Family History   Problem Relation Name Age of Onset    Liver cancer Mother      Heart attack Father      Diabetes type II Brother      Diabetes type II Brother          Allergies  Adhesive, Codeine, and Metformin    Review of Systems   Constitutional: Negative.         States recent Fall d/t dizziness. No LOC   HENT: Negative.     Eyes: Negative.    Respiratory: Negative.     Cardiovascular: Negative.    Gastrointestinal: Negative.    Endocrine: Negative.    Genitourinary: Negative.    Musculoskeletal:  Positive for arthralgias, gait problem and myalgias.        Back and right hip pain 8/10.   Painful gait.    Skin: Negative.    Allergic/Immunologic: Negative.    Hematological: Negative.    Psychiatric/Behavioral: Negative.     All other systems reviewed and are negative.       Physical Exam  Vitals and nursing note reviewed.   Constitutional:       " Appearance: Normal appearance.   HENT:      Head: Normocephalic.      Nose: Nose normal.      Mouth/Throat:      Comments:   Mallampati: 3  TMD: >3  Finger breadth: 3  Dentition:  poor  Neck ROM: limited. Forward leaning.   Eyes:      Pupils: Pupils are equal, round, and reactive to light.   Cardiovascular:      Rate and Rhythm: Normal rate and regular rhythm.      Heart sounds: Normal heart sounds, S1 normal and S2 normal.   Pulmonary:      Effort: Pulmonary effort is normal.      Breath sounds: Decreased air movement present. Decreased breath sounds present.      Comments: Overall diminished breath sounds throughout. Decreased excursion.   Chest:      Comments: kyphotic  Abdominal:      General: Bowel sounds are normal.      Palpations: Abdomen is soft.      Comments: Bowel sounds active x4 quads    Musculoskeletal:         General: Normal range of motion.      Cervical back: Normal range of motion.      Right lower leg: No edema.      Left lower leg: No edema.      Comments: Right hip pain. Using cane.   Very slow, forward leaning gait.    Skin:     General: Skin is warm and dry.      Comments: Right eye bruising. Hx of fall on 12/24/25.    Neurological:      General: No focal deficit present.      Mental Status: She is alert and oriented to person, place, and time.   Psychiatric:         Mood and Affect: Mood normal.         Behavior: Behavior normal.         Thought Content: Thought content normal.         Judgment: Judgment normal.          Last Recorded Vitals  Blood pressure 132/89, pulse 86, temperature 37 °C (98.6 °F), temperature source Temporal, resp. rate 16, height 1.638 m (5' 4.5\"), weight 58 kg (127 lb 12.8 oz), SpO2 98%.    Visit Vitals  /89   Pulse 86   Temp 37 °C (98.6 °F) (Temporal)   Resp 16   Ht 1.638 m (5' 4.5\")   Wt 58 kg (127 lb 12.8 oz)   SpO2 98%   BMI 21.60 kg/m²   OB Status Hysterectomy   Smoking Status Every Day   BSA 1.62 m²       DASI Risk Score      Flowsheet Row Questionnaire " Series Submission from 12/22/2024 in [facility] with Generic Provider Martin   Can you take care of yourself (eat, dress, bathe, or use toilet)?  2.75  filed at 12/22/2024 1826   Can you walk indoors, such as around your house? 1.75  filed at 12/22/2024 1826   Can you climb a flight of stairs or walk up a hill? 0  filed at 12/22/2024 1826   Can you run a short distance? 0  filed at 12/22/2024 1826   Can you do light work around the house like dusting or washing dishes? 2.7  filed at 12/22/2024 1826   Can you do moderate work around the house like vacuuming, sweeping floors or carrying groceries? 3.5  filed at 12/22/2024 1826   Can you do heavy work around the house like scrubbing floors or lifting and moving heavy furniture?  0  filed at 12/22/2024 1826   Can you do yard work like raking leaves, weeding or pushing a mower? 0  filed at 12/22/2024 1826   Can you participate in moderate recreational activities like golf, bowling, dancing, doubles tennis or throwing a baseball or football? 0  filed at 12/22/2024 1826   Can you participate in strenous sports like swimming, singles tennis, football, basketball, or skiing? 0  filed at 12/22/2024 1826          Caprini DVT Assessment      Flowsheet Row Pre-Admission Testing from 1/17/2025 in  [facility]   DVT Score (IF A SCORE IS NOT CALCULATING, MUST SELECT A BMI TO COMPLETE) 11 filed at 01/17/2025 0815   Medical Factors COPD filed at 01/17/2025 0815   Surgical Factors Elective major lower extremity arthroplasty, Major surgery planned, including arthroscopic and laproscopic (1-2 hours) filed at 01/17/2025 0815   BMI (BMI MUST BE CHOSEN) 30 or less filed at 01/17/2025 0815          Modified Frailty Index    No data to display       CHADS2 Stroke Risk  Current as of 58 minutes ago        N/A 3 to 100%: High Risk   2 to < 3%: Medium Risk   0 to < 2%: Low Risk     Last Change: N/A          This score determines the patient's risk of having a stroke if the  patient has atrial fibrillation.        This score is not applicable to this patient. Components are not calculated.          Revised Cardiac Risk Index      Flowsheet Row Pre-Admission Testing from 1/17/2025 in  [facility]   High-Risk Surgery (Intraperitoneal, Intrathoracic,Suprainguinal vascular) 1 filed at 01/17/2025 0816   History of ischemic heart disease (History of MI, History of positive exercuse test, Current chest paint considered due to myocardial ischemia, Use of nitrate therapy, ECG with pathological Q Waves) 0 filed at 01/17/2025 0816   History of congestive heart failure (pulmonary edemia, bilateral rales or S3 gallop, Paroxysmal nocturnal dyspnea, CXR showing pulmonary vascular redistribution) 0 filed at 01/17/2025 0816   History of cerebrovascular disease (Prior TIA or stroke) 0 filed at 01/17/2025 0816   Pre-operative insulin treatment 0 filed at 01/17/2025 0816   Pre-operative creatinine>2 mg/dl 0 filed at 01/17/2025 0816   Revised Cardiac Risk Calculator 1 filed at 01/17/2025 0816          Apfel Simplified Score      Flowsheet Row Pre-Admission Testing from 1/17/2025 in  [facility]   Smoking status 0 filed at 01/17/2025 0817   History of motion sickness or PONV  0 filed at 01/17/2025 0817   Use of postoperative opioids 1 filed at 01/17/2025 0817   Gender - Female 1=Yes filed at 01/17/2025 0817   Apfel Simplified Score Calculator 2 filed at 01/17/2025 0817          Risk Analysis Index Results This Encounter    No data found in the last 10 encounters.       Stop Bang Score      Flowsheet Row Pre-Admission Testing from 1/17/2025 in  [facility] Questionnaire Series Submission from 12/22/2024 in [facility] with Generic Provider Martin   Do you snore loudly? -- 0  filed at 12/22/2024 1826   Do you often feel tired or fatigued after your sleep? -- 0  filed at 12/22/2024 1826   Has anyone ever observed you stop breathing in your sleep? -- 0  filed at  12/22/2024 1826   Do you have or are you being treated for high blood pressure? -- 0  filed at 12/22/2024 1826   Recent BMI (Calculated) -- 21.5  filed at 12/22/2024 1826   Is BMI greater than 35 kg/m2? -- 0=No  filed at 12/22/2024 1826   Age older than 50 years old? -- 1=Yes  filed at 12/22/2024 1826   Is your neck circumference greater than 17 inches (Male) or 16 inches (Female)? 0 filed at 01/17/2025 0807 --   Gender - Male -- 0=No  filed at 12/22/2024 1826          Prodigy: High Risk  Total Score: 12              Prodigy Age Score           ARISCAT Score for Postoperative Pulmonary Complications      Flowsheet Row Pre-Admission Testing from 1/17/2025 in  [facility]   Age Calculated Score 3 filed at 01/17/2025 0919   Preoperative SpO2 0 filed at 01/17/2025 0919   Respiratory infection in the last month Either upper or lower (i.e., URI, bronchitis, pneumonia), with fever and antibiotic treatment 0 filed at 01/17/2025 0919   Preoperative anemia (Hgb less than 10 g/dl) 0 filed at 01/17/2025 0919   Surgical incision  0 filed at 01/17/2025 0919   Duration of surgery  0 filed at 01/17/2025 0919   Emergency Procedure  0 filed at 01/17/2025 0919   ARISCAT Total Score  3 filed at 01/17/2025 0919          Deloris Perioperative Risk for Myocardial Infarction or Cardiac Arrest (GARRETT)      Flowsheet Row Pre-Admission Testing from 1/17/2025 in  [facility]   Calculated Age Score 1.44 filed at 01/17/2025 0919   Functional Status  0.65 filed at 01/17/2025 0919   ASA Class  -3.29 filed at 01/17/2025 0919   Creatinine 0 filed at 01/17/2025 0919   Type of Procedure  0.80 filed at 01/17/2025 0919   GARRETT Total Score  -5.65 filed at 01/17/2025 0919   GARRETT % 0.35 filed at 01/17/2025 0919          Relevant Results  Results for orders placed or performed in visit on 01/17/25 (from the past 24 hours)   Basic Metabolic Panel   Result Value Ref Range    Glucose 117 (H) 74 - 99 mg/dL    Sodium 135 (L) 136 - 145  mmol/L    Potassium 4.6 3.5 - 5.3 mmol/L    Chloride 102 98 - 107 mmol/L    Bicarbonate 25 21 - 32 mmol/L    Anion Gap 13 10 - 20 mmol/L    Urea Nitrogen 19 6 - 23 mg/dL    Creatinine 0.56 0.50 - 1.05 mg/dL    eGFR >90 >60 mL/min/1.73m*2    Calcium 9.0 8.6 - 10.3 mg/dL   CBC   Result Value Ref Range    WBC 9.9 4.4 - 11.3 x10*3/uL    nRBC 0.0 0.0 - 0.0 /100 WBCs    RBC 4.39 4.00 - 5.20 x10*6/uL    Hemoglobin 14.0 12.0 - 16.0 g/dL    Hematocrit 42.8 36.0 - 46.0 %    MCV 98 80 - 100 fL    MCH 31.9 26.0 - 34.0 pg    MCHC 32.7 32.0 - 36.0 g/dL    RDW 12.4 11.5 - 14.5 %    Platelets 268 150 - 450 x10*3/uL   Type And Screen   Result Value Ref Range    ABO TYPE O     Rh TYPE POS     ANTIBODY SCREEN NEG              Assessment/Plan   Problem List Items Addressed This Visit       Type 2 diabetes mellitus with diabetic neuropathy, with long-term current use of insulin    Relevant Orders    Basic Metabolic Panel (Completed)    CBC (Completed)    Chronic obstructive lung disease (Multi)    Relevant Orders    Basic Metabolic Panel (Completed)    CBC (Completed)    XR chest 2 views    Essential hypertension    Relevant Orders    Basic Metabolic Panel (Completed)    CBC (Completed)    Primary osteoarthritis of right hip    Relevant Medications    chlorhexidine (Peridex) 0.12 % solution    Other Relevant Orders    Staphylococcus aureus/MRSA colonization, Culture    Type And Screen     Other Visit Diagnoses       Pre-op evaluation    -  Primary    Relevant Medications    chlorhexidine (Peridex) 0.12 % solution    Other Relevant Orders    Basic Metabolic Panel (Completed)    CBC (Completed)    Staphylococcus aureus/MRSA colonization, Culture    XR chest 2 views    Type And Screen            Scheduled for right total hip arthroplasty under general/spinal anesthesia per Dr. Sanchez on 2/5/25.   CBC, BMP, T&C ordered. Reviewed and these are WNL and acceptable for upcoming surgery.   EKG today shows SR, RBBB, rate of 77 bpm.  MRSA ordered.  Result is still pending.   CXR ordered. Result is still pending.   H&P and airway assessment completed today.  Patient has total joint class scheduled for 2/3/25.   Provided with Hibiclens today.   Ordered Peridex mouth rinse to [facility] in [place]. Patient will pick this up.   All surgery instructions reviewed with patient by RN. Verbalized understanding.   Your bp was good today. 132/89. Ok to take your metoprolol and losartan the morning of surgery with a small sip of water.   No insulin morning of surgery.   Last dose of ibuprofen 7 days prior to surgery.   Encouraged early ambulation, DVT/ PE prevention, constipation prevention, and C&DB post operatively. Patient verbalized understanding.   Refrain from smoking tobacco/THC morning of surgery.  Fall risk D/t Recent fall in December.          Marj Rudd, TAMMI-CNS

## 2025-01-17 NOTE — PREPROCEDURE INSTRUCTIONS
"   Medication List            Accurate as of January 17, 2025  8:09 AM. Always use your most recent med list.                atorvastatin 40 mg tablet  Commonly known as: Lipitor  Take 1 tablet (40 mg) by mouth once daily.     chlorhexidine 0.12 % solution  Commonly known as: Peridex  Swish and Spit 15 ml the night before and the morning of surgery. (2 Doses)     dexAMETHasone 1 mg tablet  Commonly known as: Decadron  Take 1 tablet at 11pm and obtain labs at 8am the next morning  Medication Adjustments for Surgery: Do Not take on the morning of surgery     DULoxetine 30 mg DR capsule  Commonly known as: Cymbalta  Take 1 capsule (30 mg) by mouth once daily. Do not crush or chew.  Medication Adjustments for Surgery: Do Not take on the morning of surgery     FreeStyle Mukund 3 Sensor device  Generic drug: blood-glucose sensor  Apply under skin [id number]     insulin lispro 100 unit/mL injection  Commonly known as: HumaLOG  Inject 5 Units under the skin 3 times daily (morning, midday, late afternoon). Take as directed per insulin instructions  Medication Adjustments for Surgery: Do Not take on the morning of surgery     losartan 50 mg tablet  Commonly known as: Cozaar  Take 1 tablet (50 mg) by mouth once daily.     metoprolol succinate XL 50 mg 24 hr tablet  Commonly known as: Toprol-XL  Take 1 tablet (50 mg) by mouth once daily.  Medication Adjustments for Surgery: Take on the morning of surgery     omeprazole 20 mg DR capsule  Commonly known as: PriLOSEC     omeprazole OTC 20 mg EC tablet  Commonly known as: PriLOSEC OTC  Take 1 tablet (20 mg) by mouth once daily in the morning. Take before meals.  Medication Adjustments for Surgery: Do Not take on the morning of surgery     ondansetron 4 mg tablet  Commonly known as: Zofran  Take 1 tablet (4 mg) by mouth every 8 hours if needed for nausea.  Medication Adjustments for Surgery: Do Not take on the morning of surgery     pen needle, diabetic 31 gauge x 5/16\" needle  1 each 3 " times a day.     Prodigy No Coding strip  Generic drug: blood sugar diagnostic  Prodigy Pressure Use to check 2x a day                      Take Metoprolol with 10 oz. water 2 hours before arrival time    NPO Instructions:     The night before your surgery not eat any food after midnight the night before your surgery/procedure.  Candy, gum, mints and smoking of cigarettes, marijuana or vaping is not permitted after midnight prior to your surgery   Do not drink Alcohol 24 hours prior to surgery      Increase fluid intake day before surgery    Additional Instructions:      Review your medication instructions, take indicated medications    Wear  comfortable loose fitting clothing  Do not use moisturizers, creams, lotions or perfume  All jewelry and valuables should be left at home. May bring glasses and partials.    Stop blood thinning medications as instructed by ordering physician or surgeon    Shower or bathe the night before and day of surgery.  Use Surgical Soap given to you in the Total joint class.    Brush teeth and avoid perfumes, colognes, powders, makeup, aftershave and hair spray    Go to Registration, in the main lobby, upon arrival on the day of surgery and have 's license and medical insurance card available.    Call [phone] the day before your surgery/procedure to find out what time you are to arrive the next day. Call Feb. 4 after 12:30 P.M.     Please have a responsible adult to drive you home and be available to help you as needed after surgery.        Post Surgery exercises to do at home:    Deep Breathing Exercises after surgery:   Breathe deeply using your lungs as fully as possible to move   secretions and clear lungs more easily.  Do a cycle of five deep breaths every hour.      Start by placing your hands on your ribs and take a deep breath in through your nose, expanding your lower chest.  You should feel your ribs push against your hands.  Breathe out slowly through your mouth  until all the air is gone.    For every other breath, hold your breath for three seconds.  This will help keep the lungs fully open.     Coughing : Coughing is necessary to clear secretions that may accumulate in your lungs.  This should be done after breathing exercises.    If lying down, bend your knees and support you incision with a pillow or your hands to make it more comfortable.    If sitting, support your incision, lean forward and keep your feet on the floor.  After your five deep breaths, breathe in and cough out sharply.  Repeat this cycle twice or for as long as you have secretions to clear.  ( You will not put your incision at risk by coughing.)    Leg Exercises:  Leg exercises are important to maintain good blood circulation in your legs, maintain muscle strength and prevent joint stiffness.  Do each exercise for 5 repetitions every hour while awake for the first few days or until you are up and walking around.         A. Pump your feet up and down at the ankles        B. With your legs straight, make circles with your feet.        C. Bend and straighten your knees by sliding your heels up and down the bed.         D. With your legs straight tighten the muscle above your knee and push the back of your knee down             Into the bed.  Hold for five seconds and then relax.  Alternate legs.

## 2025-01-17 NOTE — H&P
History Of Present Illness  Nina Oquendo is a 74 y.o. female presenting with right hip pain, OA, painful gait. Scheduled for right total hip arthroplasty under general/spinal anesthesia per Dr. Sanchez on 2/5/25.      Past Medical History  Past Medical History:   Diagnosis Date    Anxiety     Arthritis     COPD (chronic obstructive pulmonary disease) (Multi)     Depression     GERD (gastroesophageal reflux disease)     Hyperlipidemia     Hypertension     Type 2 diabetes mellitus without complications (Multi) 12/04/2013    Diabetes mellitus       Surgical History  Past Surgical History:   Procedure Laterality Date    ADRENAL GLAND SURGERY Right     removed for cancer 2023    CATARACT EXTRACTION      OTHER SURGICAL HISTORY  08/31/2020    Hysterectomy total    OTHER SURGICAL HISTORY  08/31/2020    Cleft palate repair        Social History  She reports that she has been smoking cigarettes. She has a 30 pack-year smoking history. She has never used smokeless tobacco. She reports current drug use. Drug: Marijuana. She reports that she does not drink alcohol.    Family History  Family History   Problem Relation Name Age of Onset    Liver cancer Mother      Heart attack Father      Diabetes type II Brother      Diabetes type II Brother          Allergies  Adhesive, Codeine, and Metformin    Review of Systems   Constitutional: Negative.         States recent Fall d/t dizziness. No LOC   HENT: Negative.     Eyes: Negative.    Respiratory: Negative.     Cardiovascular: Negative.    Gastrointestinal: Negative.    Endocrine: Negative.    Genitourinary: Negative.    Musculoskeletal:  Positive for arthralgias, gait problem and myalgias.        Back and right hip pain 8/10.   Painful gait.    Skin: Negative.    Allergic/Immunologic: Negative.    Hematological: Negative.    Psychiatric/Behavioral: Negative.     All other systems reviewed and are negative.       Physical Exam  Vitals and nursing note reviewed.   Constitutional:       " Appearance: Normal appearance.   HENT:      Head: Normocephalic.      Nose: Nose normal.      Mouth/Throat:      Comments:   Mallampati: 3  TMD: >3  Finger breadth: 3  Dentition:  poor  Neck ROM: limited. Forward leaning.   Eyes:      Pupils: Pupils are equal, round, and reactive to light.   Cardiovascular:      Rate and Rhythm: Normal rate and regular rhythm.      Heart sounds: Normal heart sounds, S1 normal and S2 normal.   Pulmonary:      Effort: Pulmonary effort is normal.      Breath sounds: Decreased air movement present. Decreased breath sounds present.      Comments: Overall diminished breath sounds throughout. Decreased excursion.   Chest:      Comments: kyphotic  Abdominal:      General: Bowel sounds are normal.      Palpations: Abdomen is soft.      Comments: Bowel sounds active x4 quads    Musculoskeletal:         General: Normal range of motion.      Cervical back: Normal range of motion.      Right lower leg: No edema.      Left lower leg: No edema.      Comments: Right hip pain. Using cane.   Very slow, forward leaning gait.    Skin:     General: Skin is warm and dry.      Comments: Right eye bruising. Hx of fall on 12/24/25.    Neurological:      General: No focal deficit present.      Mental Status: She is alert and oriented to person, place, and time.   Psychiatric:         Mood and Affect: Mood normal.         Behavior: Behavior normal.         Thought Content: Thought content normal.         Judgment: Judgment normal.          Last Recorded Vitals  Blood pressure 132/89, pulse 86, temperature 37 °C (98.6 °F), temperature source Temporal, resp. rate 16, height 1.638 m (5' 4.5\"), weight 58 kg (127 lb 12.8 oz), SpO2 98%.    Visit Vitals  /89   Pulse 86   Temp 37 °C (98.6 °F) (Temporal)   Resp 16   Ht 1.638 m (5' 4.5\")   Wt 58 kg (127 lb 12.8 oz)   SpO2 98%   BMI 21.60 kg/m²   OB Status Hysterectomy   Smoking Status Every Day   BSA 1.62 m²       DASI Risk Score      Flowsheet Row Questionnaire " Series Submission from 12/22/2024 in [facility] with Generic Provider Martin   Can you take care of yourself (eat, dress, bathe, or use toilet)?  2.75  filed at 12/22/2024 1826   Can you walk indoors, such as around your house? 1.75  filed at 12/22/2024 1826   Can you climb a flight of stairs or walk up a hill? 0  filed at 12/22/2024 1826   Can you run a short distance? 0  filed at 12/22/2024 1826   Can you do light work around the house like dusting or washing dishes? 2.7  filed at 12/22/2024 1826   Can you do moderate work around the house like vacuuming, sweeping floors or carrying groceries? 3.5  filed at 12/22/2024 1826   Can you do heavy work around the house like scrubbing floors or lifting and moving heavy furniture?  0  filed at 12/22/2024 1826   Can you do yard work like raking leaves, weeding or pushing a mower? 0  filed at 12/22/2024 1826   Can you participate in moderate recreational activities like golf, bowling, dancing, doubles tennis or throwing a baseball or football? 0  filed at 12/22/2024 1826   Can you participate in strenous sports like swimming, singles tennis, football, basketball, or skiing? 0  filed at 12/22/2024 1826          Caprini DVT Assessment      Flowsheet Row Pre-Admission Testing from 1/17/2025 in  [facility]   DVT Score (IF A SCORE IS NOT CALCULATING, MUST SELECT A BMI TO COMPLETE) 11 filed at 01/17/2025 0815   Medical Factors COPD filed at 01/17/2025 0815   Surgical Factors Elective major lower extremity arthroplasty, Major surgery planned, including arthroscopic and laproscopic (1-2 hours) filed at 01/17/2025 0815   BMI (BMI MUST BE CHOSEN) 30 or less filed at 01/17/2025 0815          Modified Frailty Index    No data to display       CHADS2 Stroke Risk  Current as of 58 minutes ago        N/A 3 to 100%: High Risk   2 to < 3%: Medium Risk   0 to < 2%: Low Risk     Last Change: N/A          This score determines the patient's risk of having a stroke if the  patient has atrial fibrillation.        This score is not applicable to this patient. Components are not calculated.          Revised Cardiac Risk Index      Flowsheet Row Pre-Admission Testing from 1/17/2025 in  [facility]   High-Risk Surgery (Intraperitoneal, Intrathoracic,Suprainguinal vascular) 1 filed at 01/17/2025 0816   History of ischemic heart disease (History of MI, History of positive exercuse test, Current chest paint considered due to myocardial ischemia, Use of nitrate therapy, ECG with pathological Q Waves) 0 filed at 01/17/2025 0816   History of congestive heart failure (pulmonary edemia, bilateral rales or S3 gallop, Paroxysmal nocturnal dyspnea, CXR showing pulmonary vascular redistribution) 0 filed at 01/17/2025 0816   History of cerebrovascular disease (Prior TIA or stroke) 0 filed at 01/17/2025 0816   Pre-operative insulin treatment 0 filed at 01/17/2025 0816   Pre-operative creatinine>2 mg/dl 0 filed at 01/17/2025 0816   Revised Cardiac Risk Calculator 1 filed at 01/17/2025 0816          Apfel Simplified Score      Flowsheet Row Pre-Admission Testing from 1/17/2025 in  [facility]   Smoking status 0 filed at 01/17/2025 0817   History of motion sickness or PONV  0 filed at 01/17/2025 0817   Use of postoperative opioids 1 filed at 01/17/2025 0817   Gender - Female 1=Yes filed at 01/17/2025 0817   Apfel Simplified Score Calculator 2 filed at 01/17/2025 0817          Risk Analysis Index Results This Encounter    No data found in the last 10 encounters.       Stop Bang Score      Flowsheet Row Pre-Admission Testing from 1/17/2025 in  [facility] Questionnaire Series Submission from 12/22/2024 in [facility] with Generic Provider Martin   Do you snore loudly? -- 0  filed at 12/22/2024 1826   Do you often feel tired or fatigued after your sleep? -- 0  filed at 12/22/2024 1826   Has anyone ever observed you stop breathing in your sleep? -- 0  filed at  12/22/2024 1826   Do you have or are you being treated for high blood pressure? -- 0  filed at 12/22/2024 1826   Recent BMI (Calculated) -- 21.5  filed at 12/22/2024 1826   Is BMI greater than 35 kg/m2? -- 0=No  filed at 12/22/2024 1826   Age older than 50 years old? -- 1=Yes  filed at 12/22/2024 1826   Is your neck circumference greater than 17 inches (Male) or 16 inches (Female)? 0 filed at 01/17/2025 0807 --   Gender - Male -- 0=No  filed at 12/22/2024 1826          Prodigy: High Risk  Total Score: 12              Prodigy Age Score           ARISCAT Score for Postoperative Pulmonary Complications      Flowsheet Row Pre-Admission Testing from 1/17/2025 in  [facility]   Age Calculated Score 3 filed at 01/17/2025 0919   Preoperative SpO2 0 filed at 01/17/2025 0919   Respiratory infection in the last month Either upper or lower (i.e., URI, bronchitis, pneumonia), with fever and antibiotic treatment 0 filed at 01/17/2025 0919   Preoperative anemia (Hgb less than 10 g/dl) 0 filed at 01/17/2025 0919   Surgical incision  0 filed at 01/17/2025 0919   Duration of surgery  0 filed at 01/17/2025 0919   Emergency Procedure  0 filed at 01/17/2025 0919   ARISCAT Total Score  3 filed at 01/17/2025 0919          Deloris Perioperative Risk for Myocardial Infarction or Cardiac Arrest (GARRETT)      Flowsheet Row Pre-Admission Testing from 1/17/2025 in  [facility]   Calculated Age Score 1.44 filed at 01/17/2025 0919   Functional Status  0.65 filed at 01/17/2025 0919   ASA Class  -3.29 filed at 01/17/2025 0919   Creatinine 0 filed at 01/17/2025 0919   Type of Procedure  0.80 filed at 01/17/2025 0919   GARRETT Total Score  -5.65 filed at 01/17/2025 0919   GARRETT % 0.35 filed at 01/17/2025 0919          Relevant Results  Results for orders placed or performed in visit on 01/17/25 (from the past 24 hours)   Basic Metabolic Panel   Result Value Ref Range    Glucose 117 (H) 74 - 99 mg/dL    Sodium 135 (L) 136 - 145  mmol/L    Potassium 4.6 3.5 - 5.3 mmol/L    Chloride 102 98 - 107 mmol/L    Bicarbonate 25 21 - 32 mmol/L    Anion Gap 13 10 - 20 mmol/L    Urea Nitrogen 19 6 - 23 mg/dL    Creatinine 0.56 0.50 - 1.05 mg/dL    eGFR >90 >60 mL/min/1.73m*2    Calcium 9.0 8.6 - 10.3 mg/dL   CBC   Result Value Ref Range    WBC 9.9 4.4 - 11.3 x10*3/uL    nRBC 0.0 0.0 - 0.0 /100 WBCs    RBC 4.39 4.00 - 5.20 x10*6/uL    Hemoglobin 14.0 12.0 - 16.0 g/dL    Hematocrit 42.8 36.0 - 46.0 %    MCV 98 80 - 100 fL    MCH 31.9 26.0 - 34.0 pg    MCHC 32.7 32.0 - 36.0 g/dL    RDW 12.4 11.5 - 14.5 %    Platelets 268 150 - 450 x10*3/uL   Type And Screen   Result Value Ref Range    ABO TYPE O     Rh TYPE POS     ANTIBODY SCREEN NEG              Assessment/Plan   Problem List Items Addressed This Visit       Type 2 diabetes mellitus with diabetic neuropathy, with long-term current use of insulin    Relevant Orders    Basic Metabolic Panel (Completed)    CBC (Completed)    Chronic obstructive lung disease (Multi)    Relevant Orders    Basic Metabolic Panel (Completed)    CBC (Completed)    XR chest 2 views    Essential hypertension    Relevant Orders    Basic Metabolic Panel (Completed)    CBC (Completed)    Primary osteoarthritis of right hip    Relevant Medications    chlorhexidine (Peridex) 0.12 % solution    Other Relevant Orders    Staphylococcus aureus/MRSA colonization, Culture    Type And Screen     Other Visit Diagnoses       Pre-op evaluation    -  Primary    Relevant Medications    chlorhexidine (Peridex) 0.12 % solution    Other Relevant Orders    Basic Metabolic Panel (Completed)    CBC (Completed)    Staphylococcus aureus/MRSA colonization, Culture    XR chest 2 views    Type And Screen            Scheduled for right total hip arthroplasty under general/spinal anesthesia per Dr. Sanchez on 2/5/25.   CBC, BMP, T&C ordered. Reviewed and these are WNL and acceptable for upcoming surgery.   EKG today shows SR, RBBB, rate of 77 bpm.  MRSA ordered.  Result is still pending.   CXR ordered. Result is still pending.   H&P and airway assessment completed today.  Patient has total joint class scheduled for 2/3/25.   Provided with Hibiclens today.   Ordered Peridex mouth rinse to [facility] in [place]. Patient will pick this up.   All surgery instructions reviewed with patient by RN. Verbalized understanding.   Your bp was good today. 132/89. Ok to take your metoprolol and losartan the morning of surgery with a small sip of water.   No insulin morning of surgery.   Last dose of ibuprofen 7 days prior to surgery.   Encouraged early ambulation, DVT/ PE prevention, constipation prevention, and C&DB post operatively. Patient verbalized understanding.   Refrain from smoking tobacco/THC morning of surgery.  Fall risk D/t Recent fall in December.          Marj Rudd, TAMMI-CNS

## 2025-01-19 LAB — STAPHYLOCOCCUS SPEC CULT: NORMAL

## 2025-02-03 ENCOUNTER — ANESTHESIA EVENT (OUTPATIENT)
Dept: OPERATING ROOM | Facility: HOSPITAL | Age: 75
End: 2025-02-03
Payer: MEDICARE

## 2025-02-03 ENCOUNTER — APPOINTMENT (OUTPATIENT)
Dept: ORTHOPEDIC SURGERY | Facility: CLINIC | Age: 75
End: 2025-02-03
Payer: MEDICARE

## 2025-02-03 ENCOUNTER — TELEPHONE (OUTPATIENT)
Dept: PREOP | Facility: HOSPITAL | Age: 75
End: 2025-02-03

## 2025-02-03 NOTE — TELEPHONE ENCOUNTER
Thank you for attending our Joint Replacement class today in preparation for your upcoming surgery.  Topics discussed include:     MyChart Enrollment  Communication with Care Team  My Chart is the best form of communication to reach all of your caregivers  You can send messages to specific care givers, or a care team  Continued Education  You will be enrolled in a Total Joint Replacement care plan to receive additional education before and after surgery  You can review a short recording of the class content  Access to Medical Records  You can access test results, office notes, appointments, etc.  You can connect to other healthcare systems who use [employer] ([facility], [facility], [facility], etc.)  [employer]  Program Information  Consent to Enroll     Background/Understanding of Joint Replacement Surgery  Potential for same day discharge  Any questions or concerns to be directed to the surgeon's office     How to Prepare for Surgery  Use of Nicotine Products/Smoking  Stop several weeks before surgery  Such products slow down the healing process and increase risk of post-op infection and complications  Clearance for Surgery  Medical Clearance by Specialists  Dental Clearance  Cracked/Broken/Loose teeth left untreated may postpone surgery  The importance of post-op antibiotics for dental visits per surgeon protocol  Preadmission Testing  **Potential for postponed surgery if appropriate clearance is not obtained  Medication Instruction  Follow instructions provided by the doctor who prescribes your medication (typically, but not limited to cardiologist)  Preadmission testing will provide additional instructions during your appointment on what to stop and what to take as you get closer to surgery  For clarification of these instructions, please call preadmission testing directly - [phone]  Tips for Preparing the home for discharge from the hospital  Care Partner  Requirement for surgery, the patient must have a plan to have  help at home  Potential for postponed surgery if plan for home support cannot be established  How the care partner can help after surgery  CHG Body Wash/Mouth Wash  Follow the instructions given at preadmission testing  Body wash is to be used on the body and hair for 5 washes  Mouthwash is to be used the night before and morning of surgery  **This is a system-wide protocol developed by infectious disease professionals, we will not alter our recommendations for those with sensitive skin or those who have special hair needs.  Please follow the instructions as they are written as this will provide the best infection prevention measures for surgery.  Should you have an allergy to one of the products, please discuss with your preadmission team**     What to Expect in the Hospital/At Home  Morning of Surgery NPO Guidelines  Nothing to eat after midnight  Surgical and Post-Surgical Care Team  Surgical Team  Anesthesia Team  Nursing  Physical Therapy  Care Coordinating  Pharmacy  Hospital Arrival Instructions  Arrive at the time provided to you  Consider traffic patterns (rush-hour) based on arrival time  Have arrangements made for a ride home  If discharging same day, care partner should remain at the hospital  Recovering after Surgery  Recovery Room - Visitors are not brought back  Transition to hospital room - 2nd Floor, Visitors will be directed to your room  The presence of and strategies for controlling surgical pain and swelling  The importance of early mobility  Side effects after surgery  What to expect if staying overnight     Discharge Planning  The intended plan for discharge will be for patients to discharge home  All patients require a care partner (family, friend, neighbor, etc.) to stay with the patient for the first few nights after surgery  The inability to secure help at home will postpone surgery  Home Care Services set up per surgeon order  Physical Therapy  Occupational Therapy  **If desired, private  duty care can be arranged by the patient ahead of time**  Outpatient Physical Therapy per surgeon order     Recovering at Home  Wound Care  Follow wound care instructions found in your discharge paperwork  Bandage is water-resistant and you may shower with the bandage  Do not scrub directly over the bandage  Do not submerge in water until cleared (bathtub, hot tub, pool, etc.)     Post-Op Risk Prevention  Infection Prevention  Promptly seek treatment for any infections post-operatively  Routine dental visits must be postponed for 3 months after surgery  Your surgeon may require antibiotics prior to future dental visits  Any concerns for infection not related directly to the knee or the hip should be managed by your primary care provider  Blood Clots  Be sure to complete the course of blood thinning medication as prescribed by your surgeon  Movement every 1-2 hours during the day is encouraged to prevent blood clots  Monitor for signs of blood clots  Wear compression stockings as prescribed by your surgeon  Constipation  Constipation is common following surgery  Drink plenty of fluids  Take stool softener/laxative as prescribed by your surgeon  Move around frequently  Eat foods high in fiber  Fall Prevention  Prepare home ahead of time to clear space to move with walker  Remove throw rugs and electrical cords from walkways  Install railings near any stairways with more than 2 steps  Use night lights for increased visibility at night  Continue to use your assistive device until cleared by surgeon or physical therapy  Dislocation Prevention - Not all procedures will have dislocation precautions  Follow dislocation precautions provided by your surgeon  It is OK to resume sexual activity about 6 weeks following surgery  Be sure to follow any dislocation precautions assigned     Durable Medical Equipment  Cold Therapy  Breg Cold Therapy Machines  Ice/Gel Packs  Assistive Devices  Folding Walker with Wheels (in the front  only)  No Rollators  Crutches if approved by Physical Therapy and Surgeon after surgery  Hip Kits  Raised Toilet Seats  Additional Compression Stockings     Joint Preservation  Healthy Activities when Cleared  Walking  Swimming  Bike Riding  Activities to Avoid  Refrain from repetitive motions which have a high impact on the joint  Gradual Progression  Progress activity slowly, listen to your body  Common Findings - NORMAL after surgery  Clicking/Grinding  Numbness near incision     Physical Therapy  Prehabilitation exercises  START TODAY ON BOTH LEGS  Surgery Specific Precautions  Follow surgery specific precautions found in your discharge paperwork     Follow-Up Visit  All patients will see their surgeon for a follow up visit after surgery  The visit may range from 2-6 weeks after surgery and is surgeon specific        Please don't hesitate to reach out if you have any additional questions or concerns.    Danuta Castellano [phone]

## 2025-02-05 ENCOUNTER — APPOINTMENT (OUTPATIENT)
Dept: CARDIOLOGY | Facility: HOSPITAL | Age: 75
End: 2025-02-05
Payer: MEDICARE

## 2025-02-05 ENCOUNTER — APPOINTMENT (OUTPATIENT)
Dept: RADIOLOGY | Facility: HOSPITAL | Age: 75
End: 2025-02-05
Payer: MEDICARE

## 2025-02-05 ENCOUNTER — ANESTHESIA (OUTPATIENT)
Dept: OPERATING ROOM | Facility: HOSPITAL | Age: 75
End: 2025-02-05
Payer: MEDICARE

## 2025-02-05 ENCOUNTER — HOSPITAL ENCOUNTER (OUTPATIENT)
Facility: HOSPITAL | Age: 75
Discharge: HOME | End: 2025-02-06
Attending: ORTHOPAEDIC SURGERY | Admitting: ORTHOPAEDIC SURGERY
Payer: MEDICARE

## 2025-02-05 ENCOUNTER — HOME HEALTH ADMISSION (OUTPATIENT)
Dept: HOME HEALTH SERVICES | Facility: HOME HEALTH | Age: 75
End: 2025-02-05
Payer: MEDICARE

## 2025-02-05 DIAGNOSIS — M16.11 PRIMARY OSTEOARTHRITIS OF RIGHT HIP: Primary | ICD-10-CM

## 2025-02-05 LAB
ABO GROUP (TYPE) IN BLOOD: NORMAL
ANTIBODY SCREEN: NORMAL
GLUCOSE BLD MANUAL STRIP-MCNC: 157 MG/DL (ref 74–99)
GLUCOSE BLD MANUAL STRIP-MCNC: 161 MG/DL (ref 74–99)
GLUCOSE BLD MANUAL STRIP-MCNC: 166 MG/DL (ref 74–99)
GLUCOSE BLD MANUAL STRIP-MCNC: 169 MG/DL (ref 74–99)
RH FACTOR (ANTIGEN D): NORMAL

## 2025-02-05 PROCEDURE — C1776 JOINT DEVICE (IMPLANTABLE): HCPCS | Performed by: ORTHOPAEDIC SURGERY

## 2025-02-05 PROCEDURE — 72170 X-RAY EXAM OF PELVIS: CPT

## 2025-02-05 PROCEDURE — 3600000017 HC OR TIME - EACH INCREMENTAL 1 MINUTE - PROCEDURE LEVEL SIX: Performed by: ORTHOPAEDIC SURGERY

## 2025-02-05 PROCEDURE — 97116 GAIT TRAINING THERAPY: CPT | Mod: GP | Performed by: PHYSICAL THERAPIST

## 2025-02-05 PROCEDURE — RXMED WILLOW AMBULATORY MEDICATION CHARGE

## 2025-02-05 PROCEDURE — 36415 COLL VENOUS BLD VENIPUNCTURE: CPT | Performed by: ORTHOPAEDIC SURGERY

## 2025-02-05 PROCEDURE — 2500000002 HC RX 250 W HCPCS SELF ADMINISTERED DRUGS (ALT 637 FOR MEDICARE OP, ALT 636 FOR OP/ED): Performed by: ANESTHESIOLOGY

## 2025-02-05 PROCEDURE — 72170 X-RAY EXAM OF PELVIS: CPT | Performed by: RADIOLOGY

## 2025-02-05 PROCEDURE — A6213 FOAM DRG >16<=48 SQ IN W/BDR: HCPCS | Performed by: ORTHOPAEDIC SURGERY

## 2025-02-05 PROCEDURE — 86901 BLOOD TYPING SEROLOGIC RH(D): CPT | Performed by: ORTHOPAEDIC SURGERY

## 2025-02-05 PROCEDURE — 2500000004 HC RX 250 GENERAL PHARMACY W/ HCPCS (ALT 636 FOR OP/ED): Performed by: ORTHOPAEDIC SURGERY

## 2025-02-05 PROCEDURE — 97161 PT EVAL LOW COMPLEX 20 MIN: CPT | Mod: GP | Performed by: PHYSICAL THERAPIST

## 2025-02-05 PROCEDURE — 96372 THER/PROPH/DIAG INJ SC/IM: CPT | Performed by: NURSE ANESTHETIST, CERTIFIED REGISTERED

## 2025-02-05 PROCEDURE — 99222 1ST HOSP IP/OBS MODERATE 55: CPT | Performed by: NURSE PRACTITIONER

## 2025-02-05 PROCEDURE — 27130 TOTAL HIP ARTHROPLASTY: CPT

## 2025-02-05 PROCEDURE — 2500000005 HC RX 250 GENERAL PHARMACY W/O HCPCS: Performed by: ORTHOPAEDIC SURGERY

## 2025-02-05 PROCEDURE — 3700000002 HC GENERAL ANESTHESIA TIME - EACH INCREMENTAL 1 MINUTE: Performed by: ORTHOPAEDIC SURGERY

## 2025-02-05 PROCEDURE — 3600000018 HC OR TIME - INITIAL BASE CHARGE - PROCEDURE LEVEL SIX: Performed by: ORTHOPAEDIC SURGERY

## 2025-02-05 PROCEDURE — 2500000001 HC RX 250 WO HCPCS SELF ADMINISTERED DRUGS (ALT 637 FOR MEDICARE OP): Performed by: ANESTHESIOLOGY

## 2025-02-05 PROCEDURE — 2500000001 HC RX 250 WO HCPCS SELF ADMINISTERED DRUGS (ALT 637 FOR MEDICARE OP): Performed by: NURSE PRACTITIONER

## 2025-02-05 PROCEDURE — 2500000005 HC RX 250 GENERAL PHARMACY W/O HCPCS: Performed by: NURSE ANESTHETIST, CERTIFIED REGISTERED

## 2025-02-05 PROCEDURE — 2500000005 HC RX 250 GENERAL PHARMACY W/O HCPCS: Performed by: ANESTHESIOLOGY

## 2025-02-05 PROCEDURE — 2500000004 HC RX 250 GENERAL PHARMACY W/ HCPCS (ALT 636 FOR OP/ED): Performed by: ANESTHESIOLOGY

## 2025-02-05 PROCEDURE — 93005 ELECTROCARDIOGRAM TRACING: CPT

## 2025-02-05 PROCEDURE — 3700000001 HC GENERAL ANESTHESIA TIME - INITIAL BASE CHARGE: Performed by: ORTHOPAEDIC SURGERY

## 2025-02-05 PROCEDURE — 2720000007 HC OR 272 NO HCPCS: Performed by: ORTHOPAEDIC SURGERY

## 2025-02-05 PROCEDURE — C1713 ANCHOR/SCREW BN/BN,TIS/BN: HCPCS | Performed by: ORTHOPAEDIC SURGERY

## 2025-02-05 PROCEDURE — 27130 TOTAL HIP ARTHROPLASTY: CPT | Performed by: ORTHOPAEDIC SURGERY

## 2025-02-05 PROCEDURE — 7100000001 HC RECOVERY ROOM TIME - INITIAL BASE CHARGE: Performed by: ORTHOPAEDIC SURGERY

## 2025-02-05 PROCEDURE — 93010 ELECTROCARDIOGRAM REPORT: CPT | Performed by: INTERNAL MEDICINE

## 2025-02-05 PROCEDURE — 7100000011 HC EXTENDED STAY RECOVERY HOURLY - NURSING UNIT

## 2025-02-05 PROCEDURE — 97110 THERAPEUTIC EXERCISES: CPT | Mod: GP | Performed by: PHYSICAL THERAPIST

## 2025-02-05 PROCEDURE — 2500000004 HC RX 250 GENERAL PHARMACY W/ HCPCS (ALT 636 FOR OP/ED): Performed by: NURSE ANESTHETIST, CERTIFIED REGISTERED

## 2025-02-05 PROCEDURE — 7100000002 HC RECOVERY ROOM TIME - EACH INCREMENTAL 1 MINUTE: Performed by: ORTHOPAEDIC SURGERY

## 2025-02-05 PROCEDURE — 2500000002 HC RX 250 W HCPCS SELF ADMINISTERED DRUGS (ALT 637 FOR MEDICARE OP, ALT 636 FOR OP/ED): Performed by: NURSE PRACTITIONER

## 2025-02-05 PROCEDURE — 2780000003 HC OR 278 NO HCPCS: Performed by: ORTHOPAEDIC SURGERY

## 2025-02-05 PROCEDURE — 2500000001 HC RX 250 WO HCPCS SELF ADMINISTERED DRUGS (ALT 637 FOR MEDICARE OP): Performed by: ORTHOPAEDIC SURGERY

## 2025-02-05 PROCEDURE — 82947 ASSAY GLUCOSE BLOOD QUANT: CPT

## 2025-02-05 DEVICE — CERAMIC V40 FEMORAL HEAD
Type: IMPLANTABLE DEVICE | Site: HIP | Status: FUNCTIONAL
Brand: BIOLOX

## 2025-02-05 DEVICE — TRIDENT II TRITANIUM CLUSTER 50D
Type: IMPLANTABLE DEVICE | Site: HIP | Status: FUNCTIONAL
Brand: TRIDENT II

## 2025-02-05 DEVICE — TRIDENT X3 0 DEGREE POLYETHYLENE INSERT
Type: IMPLANTABLE DEVICE | Site: HIP | Status: FUNCTIONAL
Brand: TRIDENT X3 INSERT

## 2025-02-05 DEVICE — 6.5MM LOW PROFILE HEX SCREW 25MM
Type: IMPLANTABLE DEVICE | Site: HIP | Status: FUNCTIONAL
Brand: TRIDENT II

## 2025-02-05 DEVICE — 127 DEGREE NECK ANGLE HIP STEM
Type: IMPLANTABLE DEVICE | Site: HIP | Status: FUNCTIONAL
Brand: ACCOLADE

## 2025-02-05 RX ORDER — BUPIVACAINE HYDROCHLORIDE 7.5 MG/ML
INJECTION, SOLUTION EPIDURAL; RETROBULBAR AS NEEDED
Status: DISCONTINUED | OUTPATIENT
Start: 2025-02-05 | End: 2025-02-05

## 2025-02-05 RX ORDER — IPRATROPIUM BROMIDE AND ALBUTEROL SULFATE 2.5; .5 MG/3ML; MG/3ML
3 SOLUTION RESPIRATORY (INHALATION) ONCE
Status: COMPLETED | OUTPATIENT
Start: 2025-02-05 | End: 2025-02-05

## 2025-02-05 RX ORDER — ONDANSETRON 4 MG/1
4 TABLET, ORALLY DISINTEGRATING ORAL EVERY 8 HOURS PRN
Status: DISCONTINUED | OUTPATIENT
Start: 2025-02-05 | End: 2025-02-06 | Stop reason: HOSPADM

## 2025-02-05 RX ORDER — SENNOSIDES 8.6 MG/1
1 TABLET ORAL 2 TIMES DAILY
Qty: 60 TABLET | Refills: 0 | Status: SHIPPED | OUTPATIENT
Start: 2025-02-05 | End: 2025-03-08

## 2025-02-05 RX ORDER — ONDANSETRON HYDROCHLORIDE 2 MG/ML
4 INJECTION, SOLUTION INTRAVENOUS ONCE
Status: COMPLETED | OUTPATIENT
Start: 2025-02-05 | End: 2025-02-05

## 2025-02-05 RX ORDER — SODIUM CHLORIDE, SODIUM LACTATE, POTASSIUM CHLORIDE, CALCIUM CHLORIDE 600; 310; 30; 20 MG/100ML; MG/100ML; MG/100ML; MG/100ML
100 INJECTION, SOLUTION INTRAVENOUS CONTINUOUS
Status: DISCONTINUED | OUTPATIENT
Start: 2025-02-05 | End: 2025-02-06 | Stop reason: HOSPADM

## 2025-02-05 RX ORDER — METOCLOPRAMIDE HYDROCHLORIDE 5 MG/ML
10 INJECTION INTRAMUSCULAR; INTRAVENOUS EVERY 6 HOURS PRN
Status: DISCONTINUED | OUTPATIENT
Start: 2025-02-05 | End: 2025-02-06 | Stop reason: HOSPADM

## 2025-02-05 RX ORDER — TRANEXAMIC ACID 100 MG/ML
INJECTION, SOLUTION INTRAVENOUS AS NEEDED
Status: DISCONTINUED | OUTPATIENT
Start: 2025-02-05 | End: 2025-02-05

## 2025-02-05 RX ORDER — ACETAMINOPHEN 325 MG/1
650 TABLET ORAL EVERY 6 HOURS SCHEDULED
Status: DISCONTINUED | OUTPATIENT
Start: 2025-02-05 | End: 2025-02-06 | Stop reason: HOSPADM

## 2025-02-05 RX ORDER — NAPROXEN SODIUM 220 MG/1
81 TABLET, FILM COATED ORAL 2 TIMES DAILY
Qty: 60 TABLET | Refills: 0 | Status: SHIPPED | OUTPATIENT
Start: 2025-02-05 | End: 2025-03-08

## 2025-02-05 RX ORDER — KETOROLAC TROMETHAMINE 30 MG/ML
INJECTION, SOLUTION INTRAMUSCULAR; INTRAVENOUS AS NEEDED
Status: DISCONTINUED | OUTPATIENT
Start: 2025-02-05 | End: 2025-02-05

## 2025-02-05 RX ORDER — CELECOXIB 200 MG/1
400 CAPSULE ORAL ONCE
Status: COMPLETED | OUTPATIENT
Start: 2025-02-05 | End: 2025-02-05

## 2025-02-05 RX ORDER — PHENYLEPHRINE 10 MG/250 ML(40 MCG/ML)IN 0.9 % SOD.CHLORIDE INTRAVENOUS
CONTINUOUS PRN
Status: DISCONTINUED | OUTPATIENT
Start: 2025-02-05 | End: 2025-02-05

## 2025-02-05 RX ORDER — OXYCODONE HYDROCHLORIDE 5 MG/1
5 TABLET ORAL EVERY 6 HOURS PRN
Qty: 28 TABLET | Refills: 0 | Status: SHIPPED | OUTPATIENT
Start: 2025-02-05 | End: 2025-02-13

## 2025-02-05 RX ORDER — CEFAZOLIN SODIUM 2 G/100ML
2 INJECTION, SOLUTION INTRAVENOUS ONCE
Status: COMPLETED | OUTPATIENT
Start: 2025-02-05 | End: 2025-02-05

## 2025-02-05 RX ORDER — PHENYLEPHRINE HYDROCHLORIDE 10 MG/ML
INJECTION INTRAVENOUS AS NEEDED
Status: DISCONTINUED | OUTPATIENT
Start: 2025-02-05 | End: 2025-02-05

## 2025-02-05 RX ORDER — SCOPOLAMINE 1 MG/3D
1 PATCH, EXTENDED RELEASE TRANSDERMAL
Status: DISCONTINUED | OUTPATIENT
Start: 2025-02-05 | End: 2025-02-06 | Stop reason: HOSPADM

## 2025-02-05 RX ORDER — SENNOSIDES 8.6 MG/1
2 TABLET ORAL 2 TIMES DAILY
Status: DISCONTINUED | OUTPATIENT
Start: 2025-02-05 | End: 2025-02-06 | Stop reason: HOSPADM

## 2025-02-05 RX ORDER — CYCLOBENZAPRINE HCL 5 MG
5 TABLET ORAL 3 TIMES DAILY PRN
Qty: 30 TABLET | Refills: 0 | Status: SHIPPED | OUTPATIENT
Start: 2025-02-05 | End: 2025-02-16

## 2025-02-05 RX ORDER — PREGABALIN 75 MG/1
75 CAPSULE ORAL ONCE
Status: COMPLETED | OUTPATIENT
Start: 2025-02-05 | End: 2025-02-05

## 2025-02-05 RX ORDER — DEXTROSE 50 % IN WATER (D50W) INTRAVENOUS SYRINGE
25
Status: DISCONTINUED | OUTPATIENT
Start: 2025-02-05 | End: 2025-02-06 | Stop reason: HOSPADM

## 2025-02-05 RX ORDER — ONDANSETRON HYDROCHLORIDE 2 MG/ML
4 INJECTION, SOLUTION INTRAVENOUS ONCE AS NEEDED
Status: DISCONTINUED | OUTPATIENT
Start: 2025-02-05 | End: 2025-02-05 | Stop reason: HOSPADM

## 2025-02-05 RX ORDER — PROPOFOL 10 MG/ML
INJECTION, EMULSION INTRAVENOUS AS NEEDED
Status: DISCONTINUED | OUTPATIENT
Start: 2025-02-05 | End: 2025-02-05

## 2025-02-05 RX ORDER — FAMOTIDINE 10 MG/ML
20 INJECTION INTRAVENOUS ONCE
Status: COMPLETED | OUTPATIENT
Start: 2025-02-05 | End: 2025-02-05

## 2025-02-05 RX ORDER — ATORVASTATIN CALCIUM 40 MG/1
40 TABLET, FILM COATED ORAL NIGHTLY
Status: DISCONTINUED | OUTPATIENT
Start: 2025-02-05 | End: 2025-02-06 | Stop reason: HOSPADM

## 2025-02-05 RX ORDER — OXYCODONE HCL 10 MG/1
10 TABLET, FILM COATED, EXTENDED RELEASE ORAL ONCE
Status: COMPLETED | OUTPATIENT
Start: 2025-02-05 | End: 2025-02-05

## 2025-02-05 RX ORDER — PANTOPRAZOLE SODIUM 40 MG/1
40 TABLET, DELAYED RELEASE ORAL DAILY PRN
Qty: 30 TABLET | Refills: 0 | Status: SHIPPED | OUTPATIENT
Start: 2025-02-05 | End: 2025-03-08

## 2025-02-05 RX ORDER — LOSARTAN POTASSIUM 50 MG/1
50 TABLET ORAL DAILY
Status: DISCONTINUED | OUTPATIENT
Start: 2025-02-05 | End: 2025-02-06 | Stop reason: HOSPADM

## 2025-02-05 RX ORDER — OXYCODONE HYDROCHLORIDE 10 MG/1
10 TABLET ORAL EVERY 4 HOURS PRN
Status: DISCONTINUED | OUTPATIENT
Start: 2025-02-05 | End: 2025-02-06 | Stop reason: HOSPADM

## 2025-02-05 RX ORDER — KETOROLAC TROMETHAMINE 30 MG/ML
15 INJECTION, SOLUTION INTRAMUSCULAR; INTRAVENOUS EVERY 6 HOURS
Status: COMPLETED | OUTPATIENT
Start: 2025-02-05 | End: 2025-02-06

## 2025-02-05 RX ORDER — ACETAMINOPHEN 325 MG/1
1000 TABLET ORAL EVERY 8 HOURS PRN
Qty: 90 TABLET | Refills: 1 | Status: SHIPPED | OUTPATIENT
Start: 2025-02-05

## 2025-02-05 RX ORDER — METOPROLOL SUCCINATE 50 MG/1
50 TABLET, EXTENDED RELEASE ORAL DAILY
Status: DISCONTINUED | OUTPATIENT
Start: 2025-02-06 | End: 2025-02-06 | Stop reason: HOSPADM

## 2025-02-05 RX ORDER — METOCLOPRAMIDE HYDROCHLORIDE 5 MG/ML
10 INJECTION INTRAMUSCULAR; INTRAVENOUS ONCE
Status: COMPLETED | OUTPATIENT
Start: 2025-02-05 | End: 2025-02-05

## 2025-02-05 RX ORDER — VANCOMYCIN HYDROCHLORIDE 1 G/20ML
INJECTION, POWDER, LYOPHILIZED, FOR SOLUTION INTRAVENOUS AS NEEDED
Status: DISCONTINUED | OUTPATIENT
Start: 2025-02-05 | End: 2025-02-05 | Stop reason: HOSPADM

## 2025-02-05 RX ORDER — MIDAZOLAM HYDROCHLORIDE 1 MG/ML
INJECTION, SOLUTION INTRAMUSCULAR; INTRAVENOUS AS NEEDED
Status: DISCONTINUED | OUTPATIENT
Start: 2025-02-05 | End: 2025-02-05

## 2025-02-05 RX ORDER — ONDANSETRON HYDROCHLORIDE 2 MG/ML
INJECTION, SOLUTION INTRAVENOUS AS NEEDED
Status: DISCONTINUED | OUTPATIENT
Start: 2025-02-05 | End: 2025-02-05

## 2025-02-05 RX ORDER — SODIUM CITRATE AND CITRIC ACID MONOHYDRATE 334; 500 MG/5ML; MG/5ML
30 SOLUTION ORAL ONCE
Status: COMPLETED | OUTPATIENT
Start: 2025-02-05 | End: 2025-02-05

## 2025-02-05 RX ORDER — MORPHINE SULFATE 2 MG/ML
2 INJECTION, SOLUTION INTRAMUSCULAR; INTRAVENOUS EVERY 5 MIN PRN
Status: DISCONTINUED | OUTPATIENT
Start: 2025-02-05 | End: 2025-02-05 | Stop reason: HOSPADM

## 2025-02-05 RX ORDER — NALOXONE HYDROCHLORIDE 1 MG/ML
0.2 INJECTION INTRAMUSCULAR; INTRAVENOUS; SUBCUTANEOUS EVERY 5 MIN PRN
Status: DISCONTINUED | OUTPATIENT
Start: 2025-02-05 | End: 2025-02-06 | Stop reason: HOSPADM

## 2025-02-05 RX ORDER — TRAMADOL HYDROCHLORIDE 50 MG/1
50 TABLET ORAL EVERY 6 HOURS PRN
Qty: 28 TABLET | Refills: 0 | Status: SHIPPED | OUTPATIENT
Start: 2025-02-05 | End: 2025-02-13

## 2025-02-05 RX ORDER — OXYCODONE HYDROCHLORIDE 5 MG/1
5 TABLET ORAL EVERY 6 HOURS PRN
Status: DISCONTINUED | OUTPATIENT
Start: 2025-02-05 | End: 2025-02-06 | Stop reason: HOSPADM

## 2025-02-05 RX ORDER — FENTANYL CITRATE 50 UG/ML
INJECTION, SOLUTION INTRAMUSCULAR; INTRAVENOUS AS NEEDED
Status: DISCONTINUED | OUTPATIENT
Start: 2025-02-05 | End: 2025-02-05

## 2025-02-05 RX ORDER — CELECOXIB 200 MG/1
200 CAPSULE ORAL 2 TIMES DAILY
Qty: 60 CAPSULE | Refills: 0 | Status: SHIPPED | OUTPATIENT
Start: 2025-02-05 | End: 2025-03-08

## 2025-02-05 RX ORDER — PANTOPRAZOLE SODIUM 40 MG/1
40 TABLET, DELAYED RELEASE ORAL
Status: DISCONTINUED | OUTPATIENT
Start: 2025-02-06 | End: 2025-02-05

## 2025-02-05 RX ORDER — DULOXETIN HYDROCHLORIDE 30 MG/1
30 CAPSULE, DELAYED RELEASE ORAL DAILY
Status: DISCONTINUED | OUTPATIENT
Start: 2025-02-05 | End: 2025-02-06 | Stop reason: HOSPADM

## 2025-02-05 RX ORDER — DOXYCYCLINE 100 MG/1
100 CAPSULE ORAL 2 TIMES DAILY
Qty: 14 CAPSULE | Refills: 0 | Status: SHIPPED | OUTPATIENT
Start: 2025-02-05 | End: 2025-02-13

## 2025-02-05 RX ORDER — ROPIVACAINE/EPI/CLONIDINE/KET 2.46-0.005
SYRINGE (ML) INJECTION AS NEEDED
Status: DISCONTINUED | OUTPATIENT
Start: 2025-02-05 | End: 2025-02-05 | Stop reason: HOSPADM

## 2025-02-05 RX ORDER — CYCLOBENZAPRINE HCL 10 MG
5 TABLET ORAL 3 TIMES DAILY PRN
Status: DISCONTINUED | OUTPATIENT
Start: 2025-02-05 | End: 2025-02-06 | Stop reason: HOSPADM

## 2025-02-05 RX ORDER — METOCLOPRAMIDE 10 MG/1
10 TABLET ORAL EVERY 6 HOURS PRN
Status: DISCONTINUED | OUTPATIENT
Start: 2025-02-05 | End: 2025-02-06 | Stop reason: HOSPADM

## 2025-02-05 RX ORDER — ASPIRIN 81 MG/1
81 TABLET ORAL 2 TIMES DAILY
Status: DISCONTINUED | OUTPATIENT
Start: 2025-02-05 | End: 2025-02-06 | Stop reason: HOSPADM

## 2025-02-05 RX ORDER — DEXTROSE 50 % IN WATER (D50W) INTRAVENOUS SYRINGE
12.5
Status: DISCONTINUED | OUTPATIENT
Start: 2025-02-05 | End: 2025-02-06 | Stop reason: HOSPADM

## 2025-02-05 RX ORDER — CEFAZOLIN SODIUM 2 G/100ML
2 INJECTION, SOLUTION INTRAVENOUS EVERY 8 HOURS
Status: COMPLETED | OUTPATIENT
Start: 2025-02-05 | End: 2025-02-06

## 2025-02-05 RX ORDER — ACETAMINOPHEN 325 MG/1
975 TABLET ORAL ONCE
Status: COMPLETED | OUTPATIENT
Start: 2025-02-05 | End: 2025-02-05

## 2025-02-05 RX ORDER — TRANEXAMIC ACID 100 MG/ML
1000 INJECTION, SOLUTION INTRAVENOUS 2 TIMES DAILY
Status: CANCELLED | OUTPATIENT
Start: 2025-02-05 | End: 2025-02-06

## 2025-02-05 RX ORDER — SODIUM CHLORIDE 9 MG/ML
20 INJECTION, SOLUTION INTRAVENOUS CONTINUOUS
Status: ACTIVE | OUTPATIENT
Start: 2025-02-05 | End: 2025-02-06

## 2025-02-05 RX ORDER — PANTOPRAZOLE SODIUM 40 MG/1
40 TABLET, DELAYED RELEASE ORAL
Status: DISCONTINUED | OUTPATIENT
Start: 2025-02-06 | End: 2025-02-06 | Stop reason: HOSPADM

## 2025-02-05 RX ORDER — ONDANSETRON HYDROCHLORIDE 2 MG/ML
4 INJECTION, SOLUTION INTRAVENOUS EVERY 8 HOURS PRN
Status: DISCONTINUED | OUTPATIENT
Start: 2025-02-05 | End: 2025-02-06 | Stop reason: HOSPADM

## 2025-02-05 RX ORDER — BISACODYL 5 MG
10 TABLET, DELAYED RELEASE (ENTERIC COATED) ORAL DAILY PRN
Status: DISCONTINUED | OUTPATIENT
Start: 2025-02-05 | End: 2025-02-06 | Stop reason: HOSPADM

## 2025-02-05 RX ORDER — MORPHINE SULFATE 4 MG/ML
4 INJECTION INTRAVENOUS EVERY 5 MIN PRN
Status: DISCONTINUED | OUTPATIENT
Start: 2025-02-05 | End: 2025-02-05 | Stop reason: HOSPADM

## 2025-02-05 RX ORDER — INSULIN LISPRO 100 [IU]/ML
0-10 INJECTION, SOLUTION INTRAVENOUS; SUBCUTANEOUS
Status: DISCONTINUED | OUTPATIENT
Start: 2025-02-05 | End: 2025-02-06 | Stop reason: HOSPADM

## 2025-02-05 RX ORDER — VANCOMYCIN HYDROCHLORIDE 1 G/20ML
1 INJECTION, POWDER, LYOPHILIZED, FOR SOLUTION INTRAVENOUS ONCE
Status: DISCONTINUED | OUTPATIENT
Start: 2025-02-05 | End: 2025-02-05 | Stop reason: HOSPADM

## 2025-02-05 RX ADMIN — TRANEXAMIC ACID 1000 MG: 1 INJECTION, SOLUTION INTRAVENOUS at 10:38

## 2025-02-05 RX ADMIN — Medication 21 PERCENT: at 17:43

## 2025-02-05 RX ADMIN — MIDAZOLAM 2 MG: 1 INJECTION INTRAMUSCULAR; INTRAVENOUS at 10:19

## 2025-02-05 RX ADMIN — LOSARTAN POTASSIUM 50 MG: 50 TABLET, FILM COATED ORAL at 16:06

## 2025-02-05 RX ADMIN — SODIUM CHLORIDE, POTASSIUM CHLORIDE, SODIUM LACTATE AND CALCIUM CHLORIDE 100 ML/HR: 600; 310; 30; 20 INJECTION, SOLUTION INTRAVENOUS at 16:10

## 2025-02-05 RX ADMIN — DULOXETINE HYDROCHLORIDE 30 MG: 30 CAPSULE, DELAYED RELEASE ORAL at 16:09

## 2025-02-05 RX ADMIN — CEFAZOLIN SODIUM 2 G: 2 INJECTION, SOLUTION INTRAVENOUS at 10:19

## 2025-02-05 RX ADMIN — KETOROLAC TROMETHAMINE 15 MG: 30 INJECTION, SOLUTION INTRAMUSCULAR at 18:06

## 2025-02-05 RX ADMIN — PROPOFOL 50 MG: 10 INJECTION, EMULSION INTRAVENOUS at 10:34

## 2025-02-05 RX ADMIN — INSULIN LISPRO 2 UNITS: 100 INJECTION, SOLUTION INTRAVENOUS; SUBCUTANEOUS at 16:09

## 2025-02-05 RX ADMIN — KETOROLAC TROMETHAMINE 15 MG: 30 INJECTION, SOLUTION INTRAMUSCULAR at 11:48

## 2025-02-05 RX ADMIN — SODIUM CHLORIDE, POTASSIUM CHLORIDE, SODIUM LACTATE AND CALCIUM CHLORIDE: 600; 310; 30; 20 INJECTION, SOLUTION INTRAVENOUS at 10:19

## 2025-02-05 RX ADMIN — POVIDONE-IODINE 1 APPLICATION: 5 SOLUTION TOPICAL at 10:03

## 2025-02-05 RX ADMIN — ASPIRIN 81 MG: 81 TABLET, COATED ORAL at 21:15

## 2025-02-05 RX ADMIN — FAMOTIDINE 20 MG: 10 INJECTION, SOLUTION INTRAVENOUS at 09:59

## 2025-02-05 RX ADMIN — PHENYLEPHRINE HYDROCHLORIDE 150 MCG: 10 INJECTION INTRAVENOUS at 10:55

## 2025-02-05 RX ADMIN — NITROGLYCERIN 0.5 INCH: 20 OINTMENT TOPICAL at 10:00

## 2025-02-05 RX ADMIN — PHENYLEPHRINE HYDROCHLORIDE 100 MCG: 10 INJECTION INTRAVENOUS at 10:42

## 2025-02-05 RX ADMIN — PHENYLEPHRINE-NACL IV SOLUTION 10 MG/250ML-0.9% 0.4 MCG/KG/MIN: 10-0.9/25 SOLUTION at 11:08

## 2025-02-05 RX ADMIN — ONDANSETRON 4 MG: 2 INJECTION INTRAMUSCULAR; INTRAVENOUS at 11:47

## 2025-02-05 RX ADMIN — ONDANSETRON 4 MG: 2 INJECTION INTRAMUSCULAR; INTRAVENOUS at 09:59

## 2025-02-05 RX ADMIN — PROPOFOL 85 MCG/KG/MIN: 10 INJECTION, EMULSION INTRAVENOUS at 10:35

## 2025-02-05 RX ADMIN — FENTANYL CITRATE 50 MCG: 50 INJECTION INTRAMUSCULAR; INTRAVENOUS at 10:19

## 2025-02-05 RX ADMIN — IPRATROPIUM BROMIDE AND ALBUTEROL SULFATE 3 ML: 2.5; .5 SOLUTION RESPIRATORY (INHALATION) at 10:01

## 2025-02-05 RX ADMIN — SODIUM CITRATE AND CITRIC ACID MONOHYDRATE 30 ML: 500; 334 SOLUTION ORAL at 09:59

## 2025-02-05 RX ADMIN — PREGABALIN 75 MG: 75 CAPSULE ORAL at 10:07

## 2025-02-05 RX ADMIN — ACETAMINOPHEN 975 MG: 325 TABLET ORAL at 10:07

## 2025-02-05 RX ADMIN — OXYCODONE HYDROCHLORIDE 10 MG: 10 TABLET, FILM COATED, EXTENDED RELEASE ORAL at 10:06

## 2025-02-05 RX ADMIN — PHENYLEPHRINE HYDROCHLORIDE 100 MCG: 10 INJECTION INTRAVENOUS at 10:57

## 2025-02-05 RX ADMIN — PHENYLEPHRINE HYDROCHLORIDE 100 MCG: 10 INJECTION INTRAVENOUS at 10:44

## 2025-02-05 RX ADMIN — SCOPOLAMINE 1 PATCH: 1.5 PATCH, EXTENDED RELEASE TRANSDERMAL at 10:07

## 2025-02-05 RX ADMIN — ATORVASTATIN CALCIUM 40 MG: 40 TABLET, FILM COATED ORAL at 21:15

## 2025-02-05 RX ADMIN — TRANEXAMIC ACID 1000 MG: 1 INJECTION, SOLUTION INTRAVENOUS at 11:50

## 2025-02-05 RX ADMIN — CEFAZOLIN SODIUM 2 G: 2 INJECTION, SOLUTION INTRAVENOUS at 18:06

## 2025-02-05 RX ADMIN — ACETAMINOPHEN 650 MG: 325 TABLET ORAL at 18:06

## 2025-02-05 RX ADMIN — CELECOXIB 400 MG: 200 CAPSULE ORAL at 10:07

## 2025-02-05 RX ADMIN — PHENYLEPHRINE HYDROCHLORIDE 200 MCG: 10 INJECTION INTRAVENOUS at 10:39

## 2025-02-05 RX ADMIN — BUPIVACAINE HYDROCHLORIDE 0.8 ML: 7.5 INJECTION, SOLUTION EPIDURAL; RETROBULBAR at 10:33

## 2025-02-05 RX ADMIN — METOCLOPRAMIDE 10 MG: 5 INJECTION, SOLUTION INTRAMUSCULAR; INTRAVENOUS at 09:59

## 2025-02-05 RX ADMIN — PHENYLEPHRINE HYDROCHLORIDE 50 MCG: 10 INJECTION INTRAVENOUS at 10:52

## 2025-02-05 RX ADMIN — PHENYLEPHRINE HYDROCHLORIDE 100 MCG: 10 INJECTION INTRAVENOUS at 10:45

## 2025-02-05 SDOH — SOCIAL STABILITY: SOCIAL INSECURITY: DOES ANYONE TRY TO KEEP YOU FROM HAVING/CONTACTING OTHER FRIENDS OR DOING THINGS OUTSIDE YOUR HOME?: NO

## 2025-02-05 SDOH — ECONOMIC STABILITY: TRANSPORTATION INSECURITY: IN THE PAST 12 MONTHS, HAS LACK OF TRANSPORTATION KEPT YOU FROM MEDICAL APPOINTMENTS OR FROM GETTING MEDICATIONS?: NO

## 2025-02-05 SDOH — SOCIAL STABILITY: SOCIAL INSECURITY
WITHIN THE LAST YEAR, HAVE YOU BEEN KICKED, HIT, SLAPPED, OR OTHERWISE PHYSICALLY HURT BY YOUR PARTNER OR EX-PARTNER?: NO

## 2025-02-05 SDOH — ECONOMIC STABILITY: FOOD INSECURITY: WITHIN THE PAST 12 MONTHS, THE FOOD YOU BOUGHT JUST DIDN'T LAST AND YOU DIDN'T HAVE MONEY TO GET MORE.: NEVER TRUE

## 2025-02-05 SDOH — SOCIAL STABILITY: SOCIAL INSECURITY: HAS ANYONE EVER THREATENED TO HURT YOUR FAMILY OR YOUR PETS?: NO

## 2025-02-05 SDOH — ECONOMIC STABILITY: HOUSING INSECURITY: IN THE LAST 12 MONTHS, WAS THERE A TIME WHEN YOU WERE NOT ABLE TO PAY THE MORTGAGE OR RENT ON TIME?: NO

## 2025-02-05 SDOH — ECONOMIC STABILITY: HOUSING INSECURITY: AT ANY TIME IN THE PAST 12 MONTHS, WERE YOU HOMELESS OR LIVING IN A SHELTER (INCLUDING NOW)?: NO

## 2025-02-05 SDOH — SOCIAL STABILITY: SOCIAL INSECURITY: ARE THERE ANY APPARENT SIGNS OF INJURIES/BEHAVIORS THAT COULD BE RELATED TO ABUSE/NEGLECT?: NO

## 2025-02-05 SDOH — ECONOMIC STABILITY: FOOD INSECURITY: HOW HARD IS IT FOR YOU TO PAY FOR THE VERY BASICS LIKE FOOD, HOUSING, MEDICAL CARE, AND HEATING?: SOMEWHAT HARD

## 2025-02-05 SDOH — ECONOMIC STABILITY: HOUSING INSECURITY: IN THE PAST 12 MONTHS, HOW MANY TIMES HAVE YOU MOVED WHERE YOU WERE LIVING?: 0

## 2025-02-05 SDOH — SOCIAL STABILITY: SOCIAL INSECURITY: ARE YOU OR HAVE YOU BEEN THREATENED OR ABUSED PHYSICALLY, EMOTIONALLY, OR SEXUALLY BY ANYONE?: NO

## 2025-02-05 SDOH — ECONOMIC STABILITY: HOUSING INSECURITY: DO YOU FEEL UNSAFE GOING BACK TO THE PLACE WHERE YOU LIVE?: NO

## 2025-02-05 SDOH — SOCIAL STABILITY: SOCIAL INSECURITY: DO YOU FEEL ANYONE HAS EXPLOITED OR TAKEN ADVANTAGE OF YOU FINANCIALLY OR OF YOUR PERSONAL PROPERTY?: NO

## 2025-02-05 SDOH — SOCIAL STABILITY: SOCIAL INSECURITY: WITHIN THE LAST YEAR, HAVE YOU BEEN AFRAID OF YOUR PARTNER OR EX-PARTNER?: NO

## 2025-02-05 SDOH — SOCIAL STABILITY: SOCIAL INSECURITY: WITHIN THE LAST YEAR, HAVE YOU BEEN HUMILIATED OR EMOTIONALLY ABUSED IN OTHER WAYS BY YOUR PARTNER OR EX-PARTNER?: NO

## 2025-02-05 SDOH — SOCIAL STABILITY: SOCIAL INSECURITY: HAVE YOU HAD ANY THOUGHTS OF HARMING ANYONE ELSE?: NO

## 2025-02-05 SDOH — ECONOMIC STABILITY: INCOME INSECURITY: IN THE PAST 12 MONTHS HAS THE ELECTRIC, GAS, OIL, OR WATER COMPANY THREATENED TO SHUT OFF SERVICES IN YOUR HOME?: NO

## 2025-02-05 SDOH — SOCIAL STABILITY: SOCIAL INSECURITY
WITHIN THE LAST YEAR, HAVE YOU BEEN RAPED OR FORCED TO HAVE ANY KIND OF SEXUAL ACTIVITY BY YOUR PARTNER OR EX-PARTNER?: NO

## 2025-02-05 SDOH — HEALTH STABILITY: MENTAL HEALTH: CURRENT SMOKER: 1

## 2025-02-05 SDOH — SOCIAL STABILITY: SOCIAL INSECURITY: HAVE YOU HAD THOUGHTS OF HARMING ANYONE ELSE?: NO

## 2025-02-05 SDOH — ECONOMIC STABILITY: FOOD INSECURITY: WITHIN THE PAST 12 MONTHS, YOU WORRIED THAT YOUR FOOD WOULD RUN OUT BEFORE YOU GOT THE MONEY TO BUY MORE.: NEVER TRUE

## 2025-02-05 SDOH — SOCIAL STABILITY: SOCIAL INSECURITY: DO YOU FEEL UNSAFE GOING BACK TO THE PLACE WHERE YOU ARE LIVING?: NO

## 2025-02-05 SDOH — SOCIAL STABILITY: SOCIAL INSECURITY
ASK PARENT OR GUARDIAN: ARE THERE TIMES WHEN YOU, YOUR CHILD(REN), OR ANY MEMBER OF YOUR HOUSEHOLD FEEL UNSAFE, HARMED, OR THREATENED AROUND PERSONS WITH WHOM YOU KNOW OR LIVE?: NO

## 2025-02-05 SDOH — SOCIAL STABILITY: SOCIAL INSECURITY: WERE YOU ABLE TO COMPLETE ALL THE BEHAVIORAL HEALTH SCREENINGS?: YES

## 2025-02-05 SDOH — SOCIAL STABILITY: SOCIAL INSECURITY: ABUSE: ADULT

## 2025-02-05 ASSESSMENT — PAIN SCALES - GENERAL
PAINLEVEL_OUTOF10: 0 - NO PAIN
PAIN_LEVEL: 0
PAINLEVEL_OUTOF10: 0 - NO PAIN

## 2025-02-05 ASSESSMENT — PAIN - FUNCTIONAL ASSESSMENT
PAIN_FUNCTIONAL_ASSESSMENT: 0-10

## 2025-02-05 ASSESSMENT — ACTIVITIES OF DAILY LIVING (ADL)
JUDGMENT_ADEQUATE_SAFELY_COMPLETE_DAILY_ACTIVITIES: YES
DRESSING YOURSELF: INDEPENDENT
HEARING - RIGHT EAR: FUNCTIONAL
HEARING - LEFT EAR: FUNCTIONAL
LACK_OF_TRANSPORTATION: NO
GROOMING: INDEPENDENT
TOILETING: INDEPENDENT
WALKS IN HOME: NEEDS ASSISTANCE
FEEDING YOURSELF: INDEPENDENT
PATIENT'S MEMORY ADEQUATE TO SAFELY COMPLETE DAILY ACTIVITIES?: YES
BATHING: INDEPENDENT
LACK_OF_TRANSPORTATION: NO
ADEQUATE_TO_COMPLETE_ADL: YES

## 2025-02-05 ASSESSMENT — COGNITIVE AND FUNCTIONAL STATUS - GENERAL
MOVING TO AND FROM BED TO CHAIR: A LOT
STANDING UP FROM CHAIR USING ARMS: A LITTLE
DRESSING REGULAR LOWER BODY CLOTHING: TOTAL
MOVING FROM LYING ON BACK TO SITTING ON SIDE OF FLAT BED WITH BEDRAILS: A LITTLE
STANDING UP FROM CHAIR USING ARMS: A LOT
TURNING FROM BACK TO SIDE WHILE IN FLAT BAD: A LITTLE
MOBILITY SCORE: 16
MOBILITY SCORE: 14
HELP NEEDED FOR BATHING: A LOT
MOBILITY SCORE: 15
HELP NEEDED FOR BATHING: A LOT
CLIMB 3 TO 5 STEPS WITH RAILING: A LOT
WALKING IN HOSPITAL ROOM: A LITTLE
WALKING IN HOSPITAL ROOM: A LOT
DRESSING REGULAR UPPER BODY CLOTHING: A LITTLE
WALKING IN HOSPITAL ROOM: A LOT
TOILETING: A LOT
DRESSING REGULAR LOWER BODY CLOTHING: A LOT
MOVING FROM LYING ON BACK TO SITTING ON SIDE OF FLAT BED WITH BEDRAILS: A LITTLE
MOVING TO AND FROM BED TO CHAIR: A LITTLE
TOILETING: A LOT
MOVING FROM LYING ON BACK TO SITTING ON SIDE OF FLAT BED WITH BEDRAILS: A LITTLE
DAILY ACTIVITIY SCORE: 17
DRESSING REGULAR UPPER BODY CLOTHING: A LITTLE
TURNING FROM BACK TO SIDE WHILE IN FLAT BAD: A LITTLE
MOVING TO AND FROM BED TO CHAIR: A LOT
PATIENT BASELINE BEDBOUND: NO
CLIMB 3 TO 5 STEPS WITH RAILING: A LOT
STANDING UP FROM CHAIR USING ARMS: A LITTLE
CLIMB 3 TO 5 STEPS WITH RAILING: A LOT
TURNING FROM BACK TO SIDE WHILE IN FLAT BAD: A LOT
DAILY ACTIVITIY SCORE: 16

## 2025-02-05 ASSESSMENT — LIFESTYLE VARIABLES
AUDIT-C TOTAL SCORE: 0
HOW MANY STANDARD DRINKS CONTAINING ALCOHOL DO YOU HAVE ON A TYPICAL DAY: PATIENT DOES NOT DRINK
HOW OFTEN DO YOU HAVE 6 OR MORE DRINKS ON ONE OCCASION: NEVER
SKIP TO QUESTIONS 9-10: 1
AUDIT-C TOTAL SCORE: 0
HOW OFTEN DO YOU HAVE A DRINK CONTAINING ALCOHOL: NEVER

## 2025-02-05 ASSESSMENT — PATIENT HEALTH QUESTIONNAIRE - PHQ9
2. FEELING DOWN, DEPRESSED OR HOPELESS: NOT AT ALL
1. LITTLE INTEREST OR PLEASURE IN DOING THINGS: NOT AT ALL
SUM OF ALL RESPONSES TO PHQ9 QUESTIONS 1 & 2: 0

## 2025-02-05 ASSESSMENT — COLUMBIA-SUICIDE SEVERITY RATING SCALE - C-SSRS
6. HAVE YOU EVER DONE ANYTHING, STARTED TO DO ANYTHING, OR PREPARED TO DO ANYTHING TO END YOUR LIFE?: NO
2. HAVE YOU ACTUALLY HAD ANY THOUGHTS OF KILLING YOURSELF?: NO
1. IN THE PAST MONTH, HAVE YOU WISHED YOU WERE DEAD OR WISHED YOU COULD GO TO SLEEP AND NOT WAKE UP?: NO

## 2025-02-05 NOTE — ANESTHESIA PROCEDURE NOTES
Spinal Block    Patient location during procedure: OR  Start time: 2/5/2025 10:20 AM  End time: 2/5/2025 10:34 AM  Reason for block: primary anesthetic and at surgeon's request  Staffing  Performed: CRNA and SRNA   Authorized by: PRAFUL Saunders    Performed by: PRAFUL Saunders    Preanesthetic Checklist  Completed: patient identified, IV checked, site marked, risks and benefits discussed, surgical consent, monitors and equipment checked, pre-op evaluation, timeout performed and sterile techniques followed  Block Timeout  RN/Licensed healthcare professional reads aloud to the Anesthesia provider and entire team: Patient identity, procedure with side and site, patient position, and as applicable the availability of implants/special equipment/special requirements.  Patient on coagulant treatment: yes  Timeout performed at: 2/5/2025 10:20 AM  Spinal Block  Patient position: sitting  Prep: Betadine  Sterility prep: cap, drape, gloves, gown, hand hygiene and mask  Sedation level: light sedation  Patient monitoring: blood pressure, continuous pulse oximetry, EKG, heart rate and ETCO2  Approach: midline  Vertebral space: L5-S1  Injection technique: single-shot  Needle  Needle type: short-bevel   Needle gauge: 24 G  Needle length: 4 in  Free flowing CSF: yes    Assessment  Sensory level: T6 bilateral  Block outcome: patient comfortable  Procedure assessment: patient tolerated procedure well with no immediate complications  Additional Notes  Prep and drape using sterile technique. Localized. Introducer inserted followed by spinal needle. + free flowing CSF, -Heme, -paresthesia. Bupivacaine inserted slowly with +swirl noted at beginning, midway, and end of injection. Pt positioned on affected side for 3 minutes then positioned for surgery via surgeon. Pt tolerated procedure well. Failed attempt at L4-5 due to periosteum. Spinal per SRNA

## 2025-02-05 NOTE — PROGRESS NOTES
Physical Therapy    Physical Therapy Evaluation & Treatment    Patient Name: Nina Oquendo  MRN: [id number]  Today's Date: 2/5/2025   Time Calculation  Start Time: 1445  Stop Time: 1534  Time Calculation (min): 49 min  3315/3315-A    Assessment/Plan   PT Assessment  PT Assessment Results: Decreased strength, Decreased range of motion, Decreased mobility, Orthopedic restrictions, Decreased safety awareness, Impaired judgement  Rehab Prognosis: Good  Barriers to Discharge Home: Caregiver assistance, Cognition needs, Physical needs  Caregiver Assistance: Caregiver assistance needed per identified barriers - however, level of patient's required assistance exceeds assistance available at home (niece lives 45 min away, can only stay for 3 days postop)  Cognition Needs: Recollection or understanding of precautions/restrictions limited, Insight of patient limited regarding functional ability/needs  Physical Needs: Stair navigation into home limited by function/safety, 24hr mobility assistance needed, 24hr ADL assistance needed, High falls risk due to function or environment  Evaluation/Treatment Tolerance: Patient tolerated treatment well  Medical Staff Made Aware: Yes  End of Session Communication: Bedside nurse, PCT/NA/CTA  Assessment Comment: anticipate pt will be prepared for home with family/caregiver support, f/u LOW intensity Rehab  End of Session Patient Position: Bed, 3 rail up, Alarm on  IP OR SWING BED PT PLAN  Inpatient or Swing Bed: Inpatient  PT Plan  Treatment/Interventions: Bed mobility, Transfer training, Gait training, Stair training, Home exercise program  PT Plan: Ongoing PT, OT consult  PT Frequency: BID  PT Discharge Recommendations: Low intensity level of continued care  Equipment Recommended upon Discharge: Bedside commode, Other (comment) (shower bench)  PT Recommended Transfer Status: Assist x1  PT - OK to Discharge: Yes        Subjective     General Visit Information:  General  Reason for  Referral: elective MARCELINO surgery  Referred By: CLARA ALONSO. PT FOR RECENT SURG: MARCELINO, Posterior Hip Prec, WBAT  Past Medical History Relevant to Rehab: anxiety, COPD, T2DM, adrenal gland removed for CA 2023, cleft palate repair  Family/Caregiver Present: Yes  Caregiver Feedback: niece who will be postop caregiver  Prior to Session Communication: Bedside nurse, PCT/NA/CTA  Patient Position Received: Bed, 3 rail up, Alarm on  General Comment: pt seen in 3315 with legs crossed, niece repeatedly reminding pt about hip prec but pt demos no carry-over. pt DID ATTEND PRE-OP CLASS, able to verbalize 0/3 hip prec.    Home Living:  Home Living  Type of Home: Mobile home  Lives With: Alone  Home Adaptive Equipment: Walker rolling or standard (toilet riser)  Home Layout: One level  Home Access: Stairs to enter with rails  Entrance Stairs-Rails: Right  Entrance Stairs-Number of Steps: 5  Bathroom Shower/Tub: Walk-in shower  Bathroom Toilet: Adaptive toilet seating  Bathroom Equipment: Grab bars in shower, Grab bars around toilet (has NO shower chair)    Prior Level of Function:  Prior Function Per Pt/Caregiver Report  Level of [place]: Independent with ADLs and functional transfers  Receives Help From: Family  Ambulatory Assistance: Independent    Precautions:  Precautions  LE Weight Bearing Status: Weight Bearing as Tolerated  Post-Surgical Precautions: Right hip precautions (Posterior)     Objective     Pain:  Pain Assessment  Pain Assessment: 0-10  0-10 (Numeric) Pain Score: 0 - No pain  Pain Type: Surgical pain  Pain Location: Hip  Pain Orientation: Right  Pain Descriptors:  (denies)  Pain Interventions: Cold applied  Response to Interventions: Content/relaxed    Cognition:  Cognition  Overall Cognitive Status: Within Functional Limits  Orientation Level: Oriented X4    General Assessments:  General Observation  General Observation: transfer OOB, took steps to BSC, instructed self hygiene with application  hip prec, stood to  ehsan pena, gait training just into hallway then back around bed, RTB and performed MARCELINO exercise, ICE to right hip and 2 pillows between legs to prevent crossing.   Activity Tolerance  Endurance: Endurance does not limit participation in activity  Activity Tolerance Comments: asymptomatic     Dynamic Sitting Balance  Dynamic Sitting-Comments: good       Functional Assessments:     Bed Mobility  Bed Mobility: Yes  Bed Mobility 1  Bed Mobility 1: Supine to sitting, Sitting to supine  Level of Assistance 1: Moderate assistance, Moderate verbal cues, Maximum tactile cues  Bed Mobility Comments 1: hip prec instructed and enforced  Transfer 1  Transfer From 1: Bed to  Technique 1: Sit to stand  Transfer Device 1: Walker  Transfers 2  Transfer to 2: Commode-standard  Technique 2: Stand to sit, Sit to stand  Transfer Device 2: Walker  Transfer Level of Assistance 2: Minimum assistance, +2, Moderate verbal cues, Maximum tactile cues  Trials/Comments 2: repeated cues needed for hand placement  Ambulation/Gait Training 1  Device 1: Rolling walker  Assistance 1: Minimum assistance, Moderate verbal cues, Maximum tactile cues  Quality of Gait 1:  (inconsistent sequencing)  Comments/Distance (ft) 1: 40  Stairs  Stairs: No       Extremity/Trunk Assessments:        RLE   RLE : Exceptions to WFL  AROM RLE (degrees)  RLE AROM Comment: WFL (tendency to adduct and internally rotate RLE)  Strength RLE  RLE Overall Strength: Deficits (grossly 3-/5 postop)  LLE   LLE : Exceptions to WFL  AROM LLE (degrees)  LLE AROM Comment: WNL  Strength LLE  LLE Overall Strength: Deficits (L foot drop: 2+/5 eversion and DF (niece states this is chronic issue))    Treatments:  Therapeutic Exercise  Therapeutic Exercise Performed: Yes  Therapeutic Exercise Activity 1: supine: AP, QS, SAQ, HEEL SLIDES x10 each. has HEP from class.        Bed Mobility  Bed Mobility: Yes  Bed Mobility 1  Bed Mobility 1: Supine to sitting, Sitting to supine  Level of  Assistance 1: Moderate assistance, Moderate verbal cues, Maximum tactile cues  Bed Mobility Comments 1: hip prec instructed and enforced  Ambulation/Gait Training 1  Device 1: Rolling walker  Assistance 1: Minimum assistance, Moderate verbal cues, Maximum tactile cues  Quality of Gait 1:  (inconsistent sequencing)  Comments/Distance (ft) 1: 40  Transfer 1  Transfer From 1: Bed to  Technique 1: Sit to stand  Transfer Device 1: Walker  Transfers 2  Transfer to 2: Commode-standard  Technique 2: Stand to sit, Sit to stand  Transfer Device 2: Walker  Transfer Level of Assistance 2: Minimum assistance, +2, Moderate verbal cues, Maximum tactile cues  Trials/Comments 2: repeated cues needed for hand placement  Stairs  Stairs: No    Outcome Measures:  [facility] Basic Mobility  Turning from your back to your side while in a flat bed without using bedrails: A little  Moving from lying on your back to sitting on the side of a flat bed without using bedrails: A lot  Moving to and from bed to chair (including a wheelchair): A lot  Standing up from a chair using your arms (e.g. wheelchair or bedside chair): A little  To walk in hospital room: A little  Climbing 3-5 steps with railing: A lot  Basic Mobility - Total Score: 15     Goals:  Encounter Problems       Encounter Problems (Active)       HEP       Pt will perform 10+ reps AAROM/AROM RLE to improve functional strength needed for improved mobility following HEP       Start:  02/05/25    Expected End:  02/19/25               Mobility       STG - Patient will ambulate household distance using WALKER (not cane) and no more than SUPV assist, demo CORRECT GAIT SEQUENCING       Start:  02/05/25    Expected End:  02/19/25            STG - Patient will navigate 3-5 steps with rail + mod x1 assist       Start:  02/05/25    Expected End:  02/19/25               PT Transfers       STG - Patient will follow hip precautions during transfers using WALKER and SUPV assist       Start:  02/05/25     Expected End:  02/19/25               Pain - Adult          Safety            Education Documentation  Precautions, taught by Shannan Dyson, PT at 2/5/2025  4:00 PM.  Learner: Family, Patient  Readiness: Acceptance  Method: Explanation, Demonstration  Response: Verbalizes Understanding, No Evidence of Learning  Comment: posterior hip prec. sleep positions, prec with using recliner (demo for niece), correct transfers bed/chair/toilet using hip prec, ICE to R hip    Home Exercise Program, taught by Shannan Dyson, PT at 2/5/2025  4:00 PM.  Learner: Family, Patient  Readiness: Acceptance  Method: Explanation, Demonstration  Response: Verbalizes Understanding, No Evidence of Learning  Comment: posterior hip prec. sleep positions, prec with using recliner (demo for niece), correct transfers bed/chair/toilet using hip prec, ICE to R hip    Mobility Training, taught by Shannan Dyson, PT at 2/5/2025  4:00 PM.  Learner: Family, Patient  Readiness: Acceptance  Method: Explanation, Demonstration  Response: Verbalizes Understanding, No Evidence of Learning  Comment: posterior hip prec. sleep positions, prec with using recliner (demo for niece), correct transfers bed/chair/toilet using hip prec, ICE to R hip    Education Comments  No comments found.

## 2025-02-05 NOTE — ANESTHESIA PREPROCEDURE EVALUATION
Patient: Nina Oquendo    Procedure Information       Date/Time: 02/05/25 1120    Procedure: Right Total Hip Arthroplasty *23 HR OBS* (ZEE) (Right: Hip) - SCHEDULE: 2 HOURS, 23 hr obs, [place], Spinal    Location: [facility] OR 07 / Virtual [facility]    Surgeons: Harshal Sanchez, DO            Relevant Problems   Anesthesia (within normal limits)      Cardiac   (+) Benign essential hypertension   (+) Essential hypertension   (+) Hyperlipidemia      Pulmonary   (+) Chronic obstructive lung disease (Multi)      Neuro   (+) Anxiety disorder, unspecified   (+) Depressive disorder      GI   (+) Gastroesophageal reflux disease without esophagitis      /Renal (within normal limits)      Liver (within normal limits)      Endocrine   (+) Type 2 diabetes mellitus with diabetic neuropathy, with long-term current use of insulin      Hematology (within normal limits)      Musculoskeletal   (+) Primary osteoarthritis of right hip      HEENT (within normal limits)      ID (within normal limits)      Skin (within normal limits)      GYN (within normal limits)       Clinical information reviewed:   Tobacco  Allergies  Meds  Problems  Med Hx  Surg Hx  OB Status    Fam Hx  Soc Hx        NPO Detail:  NPO/Void Status  Date of Last Liquid: 02/05/25  Time of Last Liquid: 0800 (12 ounces water)  Date of Last Solid: 02/05/25  Time of Last Solid: 1800  Last Intake Type: Food         Physical Exam    Airway  Mallampati: II  TM distance: >3 FB  Neck ROM: full     Cardiovascular - normal exam     Dental        Pulmonary - normal exam     Abdominal            Anesthesia Plan    History of general anesthesia?: yes  History of complications of general anesthesia?: no    ASA 3     MAC and spinal     The patient is a current smoker.  Patient was previously instructed to abstain from smoking on day of procedure.  Patient smoked on day of procedure.    intravenous induction   Postoperative administration of opioids is intended.  Anesthetic plan  and risks discussed with patient.  Use of blood products discussed with patient who consented to blood products.    Plan discussed with attending.

## 2025-02-05 NOTE — NURSING NOTE
1209: Patient to bay with anesthesia and surgical team present. Handoff report and plan of care reviewed, all questions answered. VSS.    1222: X-ray at bedside    1229: Dr Sanchez at bedside speaking to patient    1240: Patient tolerating sips of diet pepsi at this time    1242: Post op blood glucose 166 mg/dl    1315: Handoff report given to Lindy GIBBS for room 3315 at this time via phone call    1326: Patient to room 3315 in stable condition with all belongings via transport at this time. Transport to stop by surgical waiting area to bring family up to the room.

## 2025-02-05 NOTE — OP NOTE
right TOTAL HIP ARTHROPLASTY     Date: 2025   OR Location: [facility]    Name: Nina Oquendo  : 1950    MRN: [id number]    Diagnosis  Pre-op Diagnosis      * Primary osteoarthritis of right hip [M16.11]  Post-op Diagnosis     * Primary osteoarthritis of right hip [M16.11]      Procedures  Right Total Hip Arthroplasty  01333 - NC ARTHRP ACETBLR/PROX FEM PROSTC AGRFT/ALGRFT      Surgeons      * Harshal Sanchez - Primary     Specimen: none    Staff: Circulator: Froylan Bland RN  Scrub Person: Zunilda Hurt     Drains and/or Catheters: none    Estimated Blood Loss: 75 cc    Resident/Fellow/Other Assistant:  Surgeons and Role:     * Deja Floyd PA-C - JUS First Assist    was present for the entire case. Their assistance was necessary and critical to the successful completion of the procedure.They provided skilled assistance with leg positioning, retraction, leg manipulation, implant insertion and wound closure.  A qualified assistant was not available to perform their portion of the case.    Procedure Summary  Anesthesia: Spinal    Anesthesia Staff: CRNA: TAMMI Saunders-CRNA  SRNA: Art Lopez   ASA: III       Intra-op Medications:   - 1 Gram Tranexamic acid prior to surgical incision  - 1 Gram Tranexamic acid at start of incision close  - BAO Pain cockail: ropivacaine-epinephrine-clonidine-ketorolac 2.46-0.005- 0.0008-0.3mg/mL periarticular syringe: 50 cc    IMPLANTS:   Implant Name Type Inv. Item Serial No.  Lot No. LRB No. Used Action   SCREW, LOW PROFILE HEX, 6.5 X 25 MM - [id number] Screw SCREW, LOW PROFILE HEX, 6.5 X 25 MM  [employer] [id number] Right 1 Implanted   SHELL, TRIDENT II, CLUSTERHOLE, 50D - [id number] Joint SHELL, TRIDENT II, CLUSTERHOLE, 50D  [id number] Right 1 Implanted   INSERT, TRIDENT X3 POLYETHYLENE, 0 DEG, 36MM D - [id number] Joint INSERT, TRIDENT X3 POLYETHYLENE, 0 DEG, 36MM D  [id number] Right 1 Implanted   STEM, FEM ACCOLADE +  TMZF SYS 127D SZ4 - [id number] Joint STEM, FEM ACCOLADE + TMZF SYS 127D SZ4  [employer] [id number] Right 1 Implanted   HEAD, FEMUR V40 36MM 0MM BIOLOX DELTA - [id number] Joint HEAD, FEMUR V40 36MM 0MM BIOLOX DELTA  [employer] [id number] Right 1 Implanted       Findings: See operative note    Operative Indications:  Nina Oquendo is a patient that is well-known to me and initially presented as an outpatient. They have been treated for advanced hip osteoarthritis with therapy, anti-inflammatories, and activity modification, all without improvement of symptoms. They are here for surgery for Pre-op Diagnosis      * Primary osteoarthritis of right hip [M16.11].     We therefore reviewed the alternative option of elective total hip arthroplasty. The risks and benefits of this procedure were discussed in detail as an outpatient and are documented in our outpatient record. The patient expressed full understanding and wished to proceed. Informed consent was obtained and was placed on the medical chart    The risks, benefits and potential complications of the arthroplasty surgery were discussed with the patient in detail.  Risks including but not limited to the risk of anesthesia, DVT, pulmonary embolism with the possibility of death, retained infection, and neurovascular injury.  Specific details of the procedure, hospitalization, recovery, rehabilitation, and long-term precautions were also provided. Pre-operative teaching was provided. Implant/prosthesis selection was outlined, and the many options available were explained; the final choice will be made at the time of the procedure to match the anatomy and condition of the bone, ligaments, tendons, and muscles. Understanding of all topics was conveyed to me by the patient, and consent was given to proceed with a total hip arthroplasty.     On the day of surgery the site of surgery was properly noted/marked if necessary per policy. The patient has been  actively warmed in preoperative area. Preoperative antibiotics were confirmed and started prior to surgical incision. Venous thrombosis prophylaxis have been ordered including bilateral sequential compression devices to start in pre-operative area.     Procedure In Detail:  The patient was identified in the preoperative area .Their hip was then marked by myself and the patient agreed to proceed with the outlined procedure.. They were transferred to the operating room and positioned supine on the OR table. Appropriate surgical huddle was performed with myself present. Anesthesia was then successfully induced. The patient was then positioned in the lateral decubitus position on a pegboard. All bony prominences were well-padded. The operative lower extremity was then prepped and draped in the normal sterile fashion. A critical pause was taken and we identified the patient, the site of surgery, as well as the administration of preoperative IV antibiotics. The limb was then positioned neutral on a padded Marshall stand and bony landmarks were identified on the skin.    A posterior- superior hip incision was then performed with the #10 blade scalpel and a minimally invasive direct superior approach was made. The subcutaneous tissues were developed down to the level of the fascia. Electrocautery was used to achieve hemostasis. The fibers of the gluteus siddharth were split bluntly just short of the level of the fascia andre.The exposed pericapsular fat was removed with electrocautery. The interval between the gluteus medius and the piriformis tendon was then developed. The conjoint tendon was isolated and released from its insertion on the trochanter, tagged and retracted posteriorly, protecting the sciatic nerve through the remainder of the case. The gluteus minimus was elevated from the capsule and a capsulotomy was then performed. Intracapsular retractors were positioned around the femoral neck and the hip was dislocated  posteriorly. The dislocated femoral head was noted to be eburnated over the majority of its surface with circumferential head and neck junction osteophytes. Using a saw, a provisional femoral neck osteotomy was then performed at a level based on the preoperative template. The femoral head was excised. The limb was then positioned neutral on a Marshall stand and I proceeded with acetabular exposure.     A Nick-type retractor was placed over the anterior column and an inferior capsular retractor was positioned below the acetabular teardrop. The acetabulum was prepared with hemispherical reamers. Starting with a size 45 mm reamer, the acetabular bed was medialized to the level of the medial wall. Reamers were then directed posteriorly and superiorly into the sclerotic subchondral bone. Once good, bleeding cancellous bone was encountered in all four quadrants we stopped reaming. The corresponding size of the last reamer was used to select a  Trident II hemispherical shell. This was then opened and impacted in approximately 40 degrees of abduction and 20 degrees of anteversion. One cancellous bone screw was placed in the posterior superior quadrant and the polyethylene liner was then impacted into the shell. The retractors were then removed and attention was drawn towards the femur.     The femur was delivered into the wound and a box chisel was placed into the piriformis fossa. A canal awl was placed down the canal without resistance. Using the Accolade Broach System, the femur was prepared to accept a broach with good fill and rotational stability. With this in place, there was good rotational and axial stability. The broach was noted to seat at the level of the calcar resection. No fractures were identified. Multiple trial head and neck combinations were then made and the reduced hip was stable to 90 degrees of flexion, 70 degrees of internal rotation, and 20 degrees of adduction. The hip could be fully extended,  externally rotated, and abducted without any anterior dislocation. The leg lengths were restored equally. Intraoperative radiographs were then obtained which demonstrated satisfactory positioning of the components. No fractures were identified.     Satisfied with the reconstruction, the hip was dislocated and the femoral trials were removed. The final femoral stem and head were then impacted without complication. The hip was reduced one final time and all of the tissues were thoroughly irrigated. An anesthetic injection cocktail was infiltrated throughout the soft tissues. The capsule was repaired anatomically with #1 Vicryl suture. The conjoint tendon was approximated to the posterior edge of the trochanter with #1 Vicryl suture and the gluteal fascia was repaired with interrupted suture. The rest of the incision was closed in layers with a final layer of monocryl and skin glue. Occlusive dressing was then applied.  The drapes were then removed. The patient was then transferred to the PACU in stable condition. All counts were correct at the end of the case.     Complications:  None; patient tolerated the procedure well.    Disposition: PACU - hemodynamically stable.  Condition: stable      Plan:                         Weight Bearing Status:  WBAT Bilateral LE                        Range of Motion: As tolerated                        [place]: none placed                        Dressing: Maintain for one week                        DVT Prophylaxis:  ASA 81mg BID x 4 weeks                         Antibiotics: Continue x24 hours cam-operatively                         Discharge/Follow-up: Follow up in clinic in two weeks      Harshal Sanchez DO  Attending Surgeon  Joint Replacement and Adult Reconstructive Surgery  [place], [place]      Attending Attestation: I was present and scrubbed for the entire procedure.    This note was dictated using speech recognition software and was not corrected for  spelling or grammatical errors

## 2025-02-05 NOTE — ANESTHESIA POSTPROCEDURE EVALUATION
Patient: Nina Oquendo    Procedure Summary       Date: 02/05/25 Room / Location: [facility] 07 / [facility]    Anesthesia Start: 1019 Anesthesia Stop: 1212    Procedure: Right Total Hip Arthroplasty (Right: Hip) Diagnosis:       Primary osteoarthritis of right hip      (Primary osteoarthritis of right hip [M16.11])    Surgeons: Harshal Sanchez DO Responsible Provider: PRAFUL Saunders    Anesthesia Type: MAC, spinal ASA Status: 3            Anesthesia Type: MAC, spinal    Vitals Value Taken Time   /59 02/05/25 1315   Temp 37 °C (98.6 °F) 02/05/25 1315   Pulse 60 02/05/25 1323   Resp 15 02/05/25 1323   SpO2 96 % 02/05/25 1323   Vitals shown include unfiled device data.    Anesthesia Post Evaluation    Patient location during evaluation: PACU  Patient participation: complete - patient participated  Level of consciousness: awake and alert  Pain score: 0  Pain management: adequate  Airway patency: patent  Cardiovascular status: hemodynamically stable  Respiratory status: room air  Hydration status: acceptable  Postoperative Nausea and Vomiting: none    There were no known notable events for this encounter.

## 2025-02-05 NOTE — CARE PLAN
Problem: Pain - Adult  Goal: Verbalizes/displays adequate comfort level or baseline comfort level  2/5/2025 1404 by Lindy Tan RN  Outcome: Progressing  2/5/2025 1404 by Lindy Tan RN  Outcome: Progressing  2/5/2025 1404 by Lindy Tan RN  Outcome: Progressing  2/5/2025 1403 by Lindy Tan RN  Outcome: Progressing     Problem: Safety - Adult  Goal: Free from fall injury  2/5/2025 1404 by Lindy Tan RN  Outcome: Progressing  2/5/2025 1404 by Lindy Tan RN  Outcome: Progressing  2/5/2025 1404 by Lindy Tan RN  Outcome: Progressing  2/5/2025 1403 by Lindy Tan RN  Outcome: Progressing     Problem: Discharge Planning  Goal: Discharge to home or other facility with appropriate resources  2/5/2025 1404 by Lindy Tan RN  Outcome: Progressing  2/5/2025 1404 by Lindy Tan RN  Outcome: Progressing  2/5/2025 1404 by Lindy Tan RN  Outcome: Progressing  2/5/2025 1403 by Lindy Tan RN  Outcome: Progressing     Problem: Chronic Conditions and Co-morbidities  Goal: Patient's chronic conditions and co-morbidity symptoms are monitored and maintained or improved  2/5/2025 1404 by Lindy Tan RN  Outcome: Progressing  2/5/2025 1404 by Lindy Tan RN  Outcome: Progressing  2/5/2025 1404 by Lindy Tan RN  Outcome: Progressing  2/5/2025 1403 by Lindy Tan RN  Outcome: Progressing     Problem: Nutrition  Goal: Nutrient intake appropriate for maintaining nutritional needs  2/5/2025 1404 by Lindy Tan RN  Outcome: Progressing  2/5/2025 1404 by Lindy Tan RN  Outcome: Progressing  2/5/2025 1404 by Lindy Tan RN  Outcome: Progressing  2/5/2025 1403 by Lindy Tan RN  Outcome: Progressing     Problem: Fall/Injury  Goal: Not fall by end of shift  2/5/2025 1404 by Lindy Tan RN  Outcome: Progressing  2/5/2025 1404 by Lindy  Amanda Tan RN  Outcome: Progressing  2/5/2025 1404 by Lindy Tan RN  Outcome: Progressing  Goal: Be free from injury by end of the shift  2/5/2025 1404 by Lindy Tan RN  Outcome: Progressing  2/5/2025 1404 by Lindy Tan RN  Outcome: Progressing  2/5/2025 1404 by Lindy Tan RN  Outcome: Progressing  Goal: Verbalize understanding of personal risk factors for fall in the hospital  2/5/2025 1404 by Lindy Tan RN  Outcome: Progressing  2/5/2025 1404 by Lindy Tan RN  Outcome: Progressing  2/5/2025 1404 by Lindy Tan RN  Outcome: Progressing  Goal: Verbalize understanding of risk factor reduction measures to prevent injury from fall in the home  2/5/2025 1404 by Lindy Tan RN  Outcome: Progressing  2/5/2025 1404 by Lindy Tan RN  Outcome: Progressing  2/5/2025 1404 by Lindy Tan RN  Outcome: Progressing  Goal: Use assistive devices by end of the shift  2/5/2025 1404 by Lindy Tan RN  Outcome: Progressing  2/5/2025 1404 by Lindy Tan RN  Outcome: Progressing  2/5/2025 1404 by Lindy Tan RN  Outcome: Progressing  Goal: Pace activities to prevent fatigue by end of the shift  2/5/2025 1404 by Lindy Tan RN  Outcome: Progressing  2/5/2025 1404 by Lindy Tan RN  Outcome: Progressing  2/5/2025 1404 by Lindy Tan RN  Outcome: Progressing     Problem: Skin  Goal: Decreased wound size/increased tissue granulation at next dressing change  2/5/2025 1404 by Lindy Tan RN  Outcome: Progressing  Flowsheets (Taken 2/5/2025 1404)  Decreased wound size/increased tissue granulation at next dressing change:   Promote sleep for wound healing   Protective dressings over bony prominences  2/5/2025 1404 by Lindy Tan RN  Outcome: Progressing  2/5/2025 1404 by Lindy Tan RN  Outcome: Progressing  Flowsheets (Taken 2/5/2025  1404)  Decreased wound size/increased tissue granulation at next dressing change:   Promote sleep for wound healing   Protective dressings over bony prominences  Goal: Participates in plan/prevention/treatment measures  2/5/2025 1404 by Lindy Tan RN  Outcome: Progressing  Flowsheets (Taken 2/5/2025 1404)  Participates in plan/prevention/treatment measures:   Elevate heels   Increase activity/out of bed for meals  2/5/2025 1404 by Lindy Tan RN  Outcome: Progressing  2/5/2025 1404 by Lindy Tan RN  Outcome: Progressing  Flowsheets (Taken 2/5/2025 1404)  Participates in plan/prevention/treatment measures:   Elevate heels   Increase activity/out of bed for meals

## 2025-02-05 NOTE — DISCHARGE INSTRUCTIONS
Harshal Sanchez DO  Phone: [phone] Zina Maldonado, Medical Assistant    PLEASE READ CAREFULLY BEFORE CONTACTING YOUR PROVIDER.    WE WORK COLLABORATIVELY AS A TEAM. CALLING MULTIPLE STAFF MEMBERS REGARDING THE SAME ISSUE WILL DELAY YOUR CARE.  MYCHART IS THE PREFERRED COMMUNICATION FOR ALL TEAM MEMBERS.  ________________________________________________________________________________________________________________________________________________________________________    After Surgery Instructions: TOTAL HIP ARTHROPLASTY    The following is a general guide and may be applied to most patients that go home from the hospital after surgery. If you go to a rehab facility the timeline may deviate a little. Please do not hesitate to call our office for any questions or additional guidance. We will work with you to get the best experience from your new joint replacement.     WEEK 1  Relax…Don't Overdo it!  - Get up once an hour for light activity while you are awake. (get a drink, go to the bathroom, etc.)  - Keep the surgical site iced and elevated above your heart, if possible, while you are resting.  - You will have some light exercises given to you from the physical therapist in the hospital. They will teach you posterior hip precautions that you should be mindful of for the first six weeks after surgery.    SWELLING AND BRUISING    BRUISING AND SWELLING AFTER SURGERY IS NORMAL. As the patient becomes more mobile the bruising and swelling will begin to migrate towards the ankle and foot and is usually noticed toward the end of the day.    WEEK 2-3  You will have a prescription for physical therapy given to you or electronically prescribed and you should start outpatient therapy one week after your operation. Be sure to do the home exercises on the days you are not attending physical therapy.    - Continue to progress walking as tolerated.   - Continue to use ice for soreness on the surgical site  - You may wean from  your walker to a cane when you feel safe and comfortable to do so. Your physical therapist will also be helpful with progressing your assistive device.   - Be careful with bending and twisting - If it hurts, stop!!    FOLLOW UP  - Your first post-operative visit with Dr. Harshal Sanchez should have been scheduled already. Please call [phone] for appointment questions  - You do not need new xrays for this visit  - Don't be nervous! This visit is to see how you are doing and make sure your incision is healing nicely and have begun working with physical therapy.       MEDICATION REFILLS - Please call main office number: [phone]    You will not receive a call indicating that your prescription has been filled.  Please contact your pharmacy with any questions.    Medication refills will be filled Monday-Friday 7am to 1pm ONLY. Please call the office or send a [employer] message for a refill request.  Any requests received outside of this timeframe will be handled on the next business day.  The office staff and orthopedic nurses cannot refill medications; messages should be left directly through the office or via my chart.  Please do not call multiple times or call other members of the care team for medication needs, this will cause the refill to take longer.    Per State and Institutional policy, pain medications can only be refilled every 7 days for up to six weeks following surgery.    DRIVING & TRAVEL AFTER SURGERY   Patients should anticipate waiting at least 3-6 weeks before traveling long distances after surgery.  At minimum you cannot be using your walker before considering driving and you cannot take any narcotic medications prior to driving. You will need to stop to walk around ever 1 hour during your travel to help with blood clot prevention.  Please call the office or your joint nurse to discuss prior to post-surgical travel.  Patients may not drive until cleared by the joint nurse or the  office.    DENTAL PROCEDURES & CLEANINGS  You must wait a minimum of 3 months for elective dental appointments (if possible, we understand emergencies happen), including routine cleanings or dental work including bridges, crowns, extractions, etc..  For any dental visit - cleaning or dental procedures - patients must take an antibiotic 1 hour before the appointment.  Antibiotics are a lifelong need before dental appointments.  The antibiotic prescribed will be based on each patient's allergies.    WOUND CARE  You will have an ace wrap on your leg put on during surgery. This compression wrap is for comfort and may be removed 24 hours after surgery. You may continue to use compression throughout your recovery if this is comfortable for you.  You have a waterproof bandage on your wound and may shower with this on. The waterproof bandage is to remain in place for 7 days. You may remove it yourself. You may leave your incision open to air after the bandage has been removed.  Under your waterproof bandage you have a mesh and glue dressin in place. Do not peel this off. This will be on for 3-4 weeks. You may trim the edges as they peel off on their own. You can continue to shower with this on. Let the water run freely over your incision when showering and do not scrub.   You may shower upon discharging from the hospital.  Soap and water is permitted to run over the surgical dressing.  Do not scrub directly over these items.  DO NOT soak your incision in a bath, hot tub, pool or pond/lake for a minimum of 3 weeks following your surgery.  DO NOT use lotions, creams, ointments on your wound for a minimum of 3 weeks following your surgery. At that time you may use vitamin E to assist with softening of your incision.    RESTARTING HOME ROUTINE - DIET & MEDICATIONS  Post-operative constipation can result due to a combination of inactivity, anesthesia and pain medication. To help prevent this, you should increase your water and  fiber intake. Physical activity such as walking will also help stimulate the bowels.   You may resume your normal diet when you discharge home.  Choose foods that help promote good bowel habits and prevent constipation, such as foods high in fiber.  You may restart your home medications the following day after your surgery UNLESS you have been given alternate instructions.  Follow the instructions given to you on your hospital discharge instructions for more information regarding your home medications.    IN-HOME PHYSICAL THERAPY & OUTPATIENT PHYSICAL THERAPY  Continue the exercises you were given in the hospital until you have been seen by in-home therapy.  Make sure to provide a phone number with the ability for the home care staff to leave a message if you do not answer your phone.    Depending on your treatment plan you will begin outpatient or home therapy after surgery. This should be arranged through the office of hospital during discharge  FOR PATIENT DOING HOME THERAPY: Outpatient physical therapy following knee replacement surgery should begin 2-3 weeks after surgery.  You should call to schedule this appointment ASAP if not already scheduled before surgery.  Waiting until you are ready for outpatient physical therapy will cause a delay in your care.  You may choose any outpatient physical therapy location.  Call the office for an order if needed.    EMERGENCIES - WHEN TO CONTACT THE SURGEON'S OFFICE IMMEDIATELY  Fever >101 with chills that has been present for at least 48 hours.   Excessive bleeding from incision that will not slow down. A small amount of drainage is normal and expected.  Once pressure is applied and the area is covered, do not continue to check the area regularly.  This will remove pressure and bleeding will continue.  Leave in place for 4-6 hours.  Signs of infection of incision-excessive drainage that is soaking through your dressing (especially if it is pus-like), redness that is  spreading out from the edges of your incision, or increased warmth around the area.  Excruciating pain for which the pain medication, taken as instructed, is not helping.  Severe calf pain.  Go directly to the emergency room or call 911, if you are experiencing chest pain or difficulty breathing.      ICE & COLD THERAPY INSTRUCTIONS    To assist with pain control and post-op swelling, you should be using ice regularly throughout recovery, especially for the first 6 weeks, regardless of the cold therapy method you use.      Always make sure there is a layer of protection between the cold pad and your skin.    If you are using ICE PACKS or GEL PACKS, you will need to alternate 20 minutes on, 20 minutes off twice per hour.    If you are using an ICE MACHINE, please follow the provided ice machine instructions.  These devices differ from ice or ice packs whereas the mechanism circulates water through tubing and a pad to provide longer periods of cold therapy to the desired site.  You can use your cold devices around the clock for optimal comfort.  We recommend using cold therapy after working with therapy or completing exercises on your own.  There is no set schedule in which you must follow while using cold therapy.  Below are a few points to remember when using a cold therapy device:    You do not need to need to use the 20 on, 20 off method.  Detach the pad from the cooler and ambulate at least once every hour.  You can check your skin under the pad at this time.  You may wear the cold therapy device during periods of sleep including overnight.  If you wake up during the night, you can check the skin at this time.  You do not need to wake up specifically to perform skin checks.  Empty the cooler and pad when device is not in use.  Follow 's instructions for cleaning your cold therapy device.      DISCHARGE MEDICATIONS - Please reference the sample schedule on the reverse side for instructions on how to  best schedule medications.    PAIN MEDICATION    _______ Oxycodone   Oxycodone has been prescribed for post-operative pain control. Take one 5 mg tablet every 6 hours for pain. Alternate with Tramadol. See medication example sheet. This medication will only be refilled ONCE every 7 days for a period of 6 weeks. After 6 weeks, you will transition to acetaminophen (Tylenol) and over -the- counter anti-inflammatories such as Ibuprofen, Advil or Aleve in conjunction with ICE.    Side effects may be constipation and nausea, vomiting, sleepiness, dizziness, lightheadedness, headache, blurred vision, dry mouth sweating, itching (if you have itching, over-the -counter Benadryl can be used as needed).   You may NOT operate a motor vehicle while taking this medication or have been cleared by your surgeon or PA.     ________ Tramadol   Tramadol has been prescribed for post-operative pain control. Take one 50 mg tablet every 6 hours for pain. Alternate with Oxycodone. See medication example sheet. This medication will only be refilled ONCE every 7 days for a period of 6 weeks. After 6 weeks, you will transition to acetaminophen (Tylenol) and over- the- counter anti-inflammatories such as Ibuprofen, Advil or Aleve in conjunction with ICE.    Side effects may be constipation and nausea, vomiting, sleepiness, dizziness, lightheadedness, headache, blurred vision, dry mouth, sweating, itching (if you have itching, over-the -counter Benadryl can be used as needed).   You may NOT operate a motor vehicle while taking this medication or have been cleared by your surgeon or PA.    NON-NARCOTIC PAIN MEDICATION     _________ Celecoxib (Celebrex) or Meloxicam (Mobic) - Prescription anti-inflammatory medication   One of these will be prescribed (if you are able to take it) as an adjunct anti-inflammatory to assist in pain control. Take one twice daily for 4 weeks. See medication example sheet. You will not receive refills on this  medication.   Side effects may include nausea or upset stomach    _________ Acetaminophen (Tylenol)   Acetaminophen has been prescribed as an adjunct for pain control. Take two 500 mg tablet every 6-8 hours for 4 weeks. See medication example sheet. No refills will be given after initial prescription.   Side effects may include nausea, heartburn, drowsiness, and headache.    _________Cyclobenzaprine (Flexeril)   This is a muscle relaxer that will be prescribed    Take this AS NEEDED per instructions on bottle   This will be only prescribed once and is available to help with muscle spasms. There will be no refills of this medication    BLOOD THINNER    __________ Aspirin or Other Blood Thinner   Aspirin or another medication has been prescribed as a blood thinner to prevent blood clots in your leg or lungs. Take as prescribed on the bottle for 4 weeks. You will not receive a refill on this medication.   Do not take this medication if you are on another blood thinner.    ANTI NAUSEA    __________ Pantoprazole   Pantoprazole has been prescribed to help with nausea and protect your stomach while taking pain medication. Take one 40 mg tablet once daily for 4 weeks. You will not receive a refill on this medication.    ANTIBIOTIC     If you are deemed “high risk” for infection after surgery and antibiotic will be prescribed. Please take this as directed for one week.     STOOL SOFTENERS    __________ Senna   Post-operative constipation can result due to a combination of inactivity, anesthesia and pain medication. To help prevent this, you should increase your water and fiber intake. Physical activity such as walking will also help stimulate the bowels.    Senna has been prescribed to help with constipation while on Oxycodone and Tramadol. Take one tablet twice daily for 4 weeks. No refills will be given.   You may also take Miralax in combination with Senna to prevent constipation.  This can be purchased over the counter  at your local pharmacy.  Take as instructed on the bottle.   Pain Medication Refills -[phone] or MyChart- Monday through Friday 7am-1pm    Medication refills will be sent upon receipt of your request during the times listed above. Due to the high call volume, you will not receive a call confirming prescription refills; please do not call multiple times.  Prescription refills may take a few hours to process, you may follow up with your pharmacy for pickup availability.    SAMPLE              The times below are an example of how to organize medications to optimize pain control  Your actual medication schedule may vary based on your last dose taken IN THE HOSPITAL      Time 3:00am 6:00am 9:00am 12:00pm 3:00pm 6:00pm 9:00pm 12:00am   Medications Tramadol Acetaminophen (Tylenol)   Oxycodone  Miralax   Blood Thinner  Colace  Pantoprazole  Tramadol  Meloxicam Acetaminophen (Tylenol)   Oxycodone Tramadol Acetaminophen (Tylenol)   Oxycodone  Miralax Blood Thinner  Colace  Tramadol   Acetaminophen (Tylenol)   Oxycodone            You may begin to wean off the pain medication as your pain remains controlled with increased activity.  The schedules provided are meant to serve as an example.  You may wean off based on your pain control.  Please note that pain medications are not filled beyond 6 weeks after surgery.              The times below are an example of how to WEAN OFF medications WHILE CONTINUING TO OPTIMIZE PAIN CONTROL.  Your actual medication schedule may vary based on your last dose taken.  Time 12:00am 4:00am 8:00am 12:00pm 4:00pm 8:00pm   Med Tramadol Oxycodone   Tramadol Oxycodone Tramadol Oxycodone     Time 12:00am 6:00am 12:00pm 6:00pm   Med Tramadol Oxycodone   Tramadol Oxycodone     Time 12:00am 8:00am 4:00pm   Med Tramadol Oxycodone   Tramadol     Time 12:00am 12:00pm   Med Tramadol Tramadol        _________________________________________________________________________________________________________________________________________________________________________    OFFICE INFORMATION    OFFICE LOCATIONS    Location 1: [facility]  [address], [address]  [place] [address]  Office Number: [phone]    Location 2: [facility]  [address] [place] [address] [address]  [facility], [address]  Office Number: [phone]    Location 3: [facility]  [address] [place] [address]  Office Number: [phone]

## 2025-02-05 NOTE — CONSULTS
Inpatient consult to Medicine  Consult performed by: Michoacano Sorenson, TAMMI-CNP  Consult ordered by: Harshal Sanchez DO            History Of Present Illness:  Nina Oquendo is a 74 y.o. 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 Who had  debilitating R hip pain due to osteoarthritis that worsened and was not improved with conservative therapy and underwent R MARCELINO today with Dr Sanchez. IM is consulted to assist with medical management of co morbidities. She is up on the floor has been [place] [facility] [place]. She denies any complaints of chest pain, shortness of breath, urinary symptoms, head ache. No recent fever or chills. She is hungry and looking forward to eating.           Relevant Results  Results for orders placed or performed during the hospital encounter of 02/05/25 (from the past 24 hours)   ECG 12 lead   Result Value Ref Range    Ventricular Rate 74 BPM    Atrial Rate 72 BPM    AK Interval 137 ms    QRS Duration 121 ms    QT Interval 406 ms    QTC Calculation(Bazett) 451 ms    P Axis 74 degrees    R Axis 73 degrees    T Axis 36 degrees    QRS Count 12 beats    Q Onset 249 ms    T Offset 452 ms    QTC Fredericia 435 ms   POCT GLUCOSE   Result Value Ref Range    POCT Glucose 161 (H) 74 - 99 mg/dL   Type And Screen   Result Value Ref Range    ABO TYPE O     Rh TYPE POS     ANTIBODY SCREEN NEG    POCT GLUCOSE   Result Value Ref Range    POCT Glucose 166 (H) 74 - 99 mg/dL   POCT GLUCOSE   Result Value Ref Range    POCT Glucose 157 (H) 74 - 99 mg/dL      ECG 12 lead    Result Date: 2/5/2025  Sinus rhythm Left atrial enlargement IVCD, consider atypical RBBB    XR chest 2 views    Result Date: 1/21/2025  Interpreted By:  Onofre Fox, STUDY: XR CHEST 2 VIEWS;  1/17/2025 9:38 am   INDICATION: Signs/Symptoms:Upcoming total joint replacement.   COMPARISON: None.   ACCESSION NUMBER(S): [id number]   ORDERING CLINICIAN: DAVID  MARISLE   FINDINGS: No consolidation. Hyperinflated lungs. Trace right pleural effusion versus pleural thickening. Atherosclerosis. Normal heart size. No acute osseous abnormality.       Hyperinflated lungs, correlate for COPD.   Trace right pleural effusion versus pleural thickening.   Signed by: Onofre Fox 1/21/2025 11:22 AM Dictation workstation:   [id number]    CT urography w 3D volume rendered imaging    Result Date: 1/20/2025  Interpreted By:  Onofre Fox, STUDY: CT UROGRAPHY WITH 3D VOLUME RENDERED IMAGING;  1/17/2025 10:35 am   INDICATION: 73 y/o   F with  Signs/Symptoms:urotheial cancer.   LIMITATIONS: None.   ACCESSION NUMBER(S): [id number]   ORDERING CLINICIAN: CLIFF HARDWICK   TECHNIQUE: Before and after the administration of IV iodonated contrast, spiral axial images were obtained from the xiphoid down through the symphysis pubis. Sagittal and coronal reconstruction images were generated. 3D reconstructions. 75 mL of Omnipaque 350.   COMPARISON: 12/18/2023   FINDINGS: Lower Chest: Stable branching tubular opacity in the right lower lobe suggestive of a mucous plug. Minimal streaky subsegmental atelectasis.   Liver: Couple of subcentimeter hepatic hypodensities, too small to characterize.   Gallbladder and Biliary: Unremarkable.   Pancreas: No abnormality identified in the pancreas.   Spleen: No abnormality identified in the spleen.   Adrenals: Left adrenal adenoma measuring 3.2 cm, slightly increased in size. Right adrenal gland is absent.   Urinary: No renal calculi. Few small subcentimeter bilateral renal hypodensities, too small to characterize. No solid enhancing renal mass. No filling defects in the opacified portions of the urinary tract. No hydronephrosis.   Reproductive: Status post hysterectomy.   Gastrointestinal/Peritoneum: No small or large bowel obstruction in the visualized abdomen. In the abdomen, there is no extraluminal air. No significant free fluid. Sigmoid colonic  diverticulosis. No evidence of acute appendicitis.   Vascular: Abdominal aorta is normal in caliber. Atherosclerosis.   Lymphatics: No enlarged lymph nodes by size criteria.   MSK/Body Wall: No aggressive bony lesion identified. Multilevel degenerative changes in the spine. Advanced right hip joint osteoarthritis.       Left adrenal adenoma.   No CT urographic evidence of malignancy in the urinary tract.   Signed by: Onofre Fox 1/20/2025 4:35 PM Dictation workstation:   [id number]     Scheduled medications:  acetaminophen, 650 mg, oral, q6h CANDY  aspirin, 81 mg, oral, BID  atorvastatin, 40 mg, oral, Nightly  ceFAZolin, 2 g, intravenous, q8h  DULoxetine, 30 mg, oral, Daily  insulin lispro, 0-10 Units, subcutaneous, TID AC  ketorolac, 15 mg, intravenous, q6h  losartan, 50 mg, oral, Daily  [START ON 2/6/2025] metoprolol succinate XL, 50 mg, oral, Daily  [START ON 2/6/2025] pantoprazole, 40 mg, oral, Daily before breakfast  scopolamine, 1 patch, transdermal, q72h  sennosides, 2 tablet, oral, BID      Continuous medications:  lactated Ringer's, 100 mL/hr  oxygen, 2 L/min  sodium chloride 0.9%, 20 mL/hr      PRN medications:  PRN medications: bisacodyl, cyclobenzaprine, dextrose, dextrose, glucagon, glucagon, metoclopramide **OR** metoclopramide, naloxone, ondansetron ODT **OR** ondansetron, oxyCODONE, oxyCODONE      Past Medical History  She has a past medical history of Anxiety, Arthritis, COPD (chronic obstructive pulmonary disease) (Multi), Depression, GERD (gastroesophageal reflux disease), Hyperlipidemia, Hypertension, and Type 2 diabetes mellitus without complications (Multi) (12/04/2013).    Surgical History  She has a past surgical history that includes Other surgical history (08/31/2020); Other surgical history (08/31/2020); Cataract extraction; Adrenal gland surgery (Right); and Total hip arthroplasty (Right, 02/05/2025).     Social History  She reports that she has been smoking cigarettes. She has a 30  pack-year smoking history. She has never used smokeless tobacco. She reports current drug use. Drug: Marijuana. She reports that she does not drink alcohol.    Family History  Family History   Problem Relation Name Age of Onset    Liver cancer Mother      Heart attack Father      Diabetes type II Brother      Diabetes type II Brother          Allergies  Adhesive, Codeine, and Metformin    Code Status  Full Code     Review of Systems    Last Recorded Vitals  /60 (BP Location: Left arm, Patient Position: Lying)   Pulse 61   Temp 36.8 °C (98.2 °F) (Temporal)   Resp 18   Wt 57.6 kg (127 lb)   SpO2 96%      Physical Exam  Vitals reviewed.   Constitutional:       General: She is not in acute distress.  HENT:      Head: Normocephalic and atraumatic.      Right Ear: External ear normal.      Left Ear: External ear normal.      Nose: Nose normal.      Mouth/Throat:      Mouth: Mucous membranes are moist.      Pharynx: Oropharynx is clear.   Eyes:      General: No scleral icterus.     Extraocular Movements: Extraocular movements intact.      Conjunctiva/sclera: Conjunctivae normal.      Pupils: Pupils are equal, round, and reactive to light.   Neck:      Vascular: No carotid bruit.   Cardiovascular:      Rate and Rhythm: Normal rate and regular rhythm.      Pulses: Normal pulses.      Heart sounds: Normal heart sounds. No murmur heard.  Pulmonary:      Effort: Pulmonary effort is normal. No respiratory distress.      Breath sounds: Normal breath sounds. No wheezing, rhonchi or rales.   Chest:      Chest wall: No tenderness.   Abdominal:      General: Bowel sounds are normal. There is no distension.      Palpations: Abdomen is soft. There is no mass.      Tenderness: There is no abdominal tenderness. There is no rebound.   Musculoskeletal:         General: No swelling or deformity. Normal range of motion.      Cervical back: Normal range of motion.      Right lower leg: No edema.      Left lower leg: No edema.    Skin:     General: Skin is warm and dry.      Capillary Refill: Capillary refill takes less than 2 seconds.   Neurological:      General: No focal deficit present.      Mental Status: She is alert and oriented to person, place, and time.      Cranial Nerves: No cranial nerve deficit.      Sensory: No sensory deficit.   Psychiatric:         Mood and Affect: Mood normal.         Behavior: Behavior normal.         Assessment/Plan   Assessment & Plan  Primary osteoarthritis of right hip    OA R hip s/p R MARCELINO  Pt appears to be doing well   Continue poc per ortho    IDDM2  Diabetic diet ordered  Cover with sliding scale insulin    HTN  Controlled  Home medications reconciled  Cozaar and metoprolol    HLD continue atorvastatin    Gerd  Continued on PPI    No new Assessment & Plan notes have been filed under this hospital service since the last note was generated.  Service: Internal Medicine          Michoacano Sorenson, TAMMI-CNP    Dragon dictation software was used to dictate this note and thus there may be minor errors in translation/transcription including garbled speech or misspellings. Please contact for clarification if needed.

## 2025-02-06 ENCOUNTER — DOCUMENTATION (OUTPATIENT)
Dept: HOME HEALTH SERVICES | Facility: HOME HEALTH | Age: 75
End: 2025-02-06
Payer: MEDICARE

## 2025-02-06 ENCOUNTER — PHARMACY VISIT (OUTPATIENT)
Dept: PHARMACY | Facility: CLINIC | Age: 75
End: 2025-02-06
Payer: COMMERCIAL

## 2025-02-06 VITALS
HEIGHT: 65 IN | DIASTOLIC BLOOD PRESSURE: 61 MMHG | RESPIRATION RATE: 16 BRPM | WEIGHT: 127 LBS | OXYGEN SATURATION: 96 % | SYSTOLIC BLOOD PRESSURE: 103 MMHG | HEART RATE: 65 BPM | BODY MASS INDEX: 21.16 KG/M2 | TEMPERATURE: 97.5 F

## 2025-02-06 LAB
ATRIAL RATE: 72 BPM
GLUCOSE BLD MANUAL STRIP-MCNC: 137 MG/DL (ref 74–99)
GLUCOSE BLD MANUAL STRIP-MCNC: 163 MG/DL (ref 74–99)
P AXIS: 74 DEGREES
PR INTERVAL: 137 MS
Q ONSET: 249 MS
QRS COUNT: 12 BEATS
QRS DURATION: 121 MS
QT INTERVAL: 406 MS
QTC CALCULATION(BAZETT): 451 MS
QTC FREDERICIA: 435 MS
R AXIS: 73 DEGREES
T AXIS: 36 DEGREES
T OFFSET: 452 MS
VENTRICULAR RATE: 74 BPM

## 2025-02-06 PROCEDURE — 2500000001 HC RX 250 WO HCPCS SELF ADMINISTERED DRUGS (ALT 637 FOR MEDICARE OP): Performed by: ORTHOPAEDIC SURGERY

## 2025-02-06 PROCEDURE — 2500000001 HC RX 250 WO HCPCS SELF ADMINISTERED DRUGS (ALT 637 FOR MEDICARE OP): Performed by: NURSE PRACTITIONER

## 2025-02-06 PROCEDURE — 82947 ASSAY GLUCOSE BLOOD QUANT: CPT

## 2025-02-06 PROCEDURE — 97530 THERAPEUTIC ACTIVITIES: CPT | Mod: GO

## 2025-02-06 PROCEDURE — 2500000004 HC RX 250 GENERAL PHARMACY W/ HCPCS (ALT 636 FOR OP/ED): Performed by: ORTHOPAEDIC SURGERY

## 2025-02-06 PROCEDURE — 97165 OT EVAL LOW COMPLEX 30 MIN: CPT | Mod: GO

## 2025-02-06 PROCEDURE — 2500000002 HC RX 250 W HCPCS SELF ADMINISTERED DRUGS (ALT 637 FOR MEDICARE OP, ALT 636 FOR OP/ED): Performed by: NURSE PRACTITIONER

## 2025-02-06 PROCEDURE — 7100000011 HC EXTENDED STAY RECOVERY HOURLY - NURSING UNIT

## 2025-02-06 PROCEDURE — 97116 GAIT TRAINING THERAPY: CPT | Mod: GP,CQ

## 2025-02-06 PROCEDURE — 97110 THERAPEUTIC EXERCISES: CPT | Mod: GP,CQ

## 2025-02-06 RX ADMIN — METOPROLOL SUCCINATE 50 MG: 50 TABLET, EXTENDED RELEASE ORAL at 08:43

## 2025-02-06 RX ADMIN — ACETAMINOPHEN 650 MG: 325 TABLET ORAL at 11:35

## 2025-02-06 RX ADMIN — CEFAZOLIN SODIUM 2 G: 2 INJECTION, SOLUTION INTRAVENOUS at 00:42

## 2025-02-06 RX ADMIN — KETOROLAC TROMETHAMINE 15 MG: 30 INJECTION, SOLUTION INTRAMUSCULAR at 06:17

## 2025-02-06 RX ADMIN — KETOROLAC TROMETHAMINE 15 MG: 30 INJECTION, SOLUTION INTRAMUSCULAR at 00:42

## 2025-02-06 RX ADMIN — KETOROLAC TROMETHAMINE 15 MG: 30 INJECTION, SOLUTION INTRAMUSCULAR at 11:35

## 2025-02-06 RX ADMIN — ASPIRIN 81 MG: 81 TABLET, COATED ORAL at 08:43

## 2025-02-06 RX ADMIN — ACETAMINOPHEN 650 MG: 325 TABLET ORAL at 06:17

## 2025-02-06 RX ADMIN — PANTOPRAZOLE SODIUM 40 MG: 40 TABLET, DELAYED RELEASE ORAL at 06:17

## 2025-02-06 RX ADMIN — ACETAMINOPHEN 650 MG: 325 TABLET ORAL at 00:42

## 2025-02-06 RX ADMIN — SODIUM CHLORIDE, POTASSIUM CHLORIDE, SODIUM LACTATE AND CALCIUM CHLORIDE 100 ML/HR: 600; 310; 30; 20 INJECTION, SOLUTION INTRAVENOUS at 03:31

## 2025-02-06 RX ADMIN — LOSARTAN POTASSIUM 50 MG: 50 TABLET, FILM COATED ORAL at 08:43

## 2025-02-06 RX ADMIN — DULOXETINE HYDROCHLORIDE 30 MG: 30 CAPSULE, DELAYED RELEASE ORAL at 08:43

## 2025-02-06 RX ADMIN — INSULIN LISPRO 2 UNITS: 100 INJECTION, SOLUTION INTRAVENOUS; SUBCUTANEOUS at 11:37

## 2025-02-06 ASSESSMENT — COGNITIVE AND FUNCTIONAL STATUS - GENERAL
CLIMB 3 TO 5 STEPS WITH RAILING: A LOT
MOVING TO AND FROM BED TO CHAIR: A LOT
TURNING FROM BACK TO SIDE WHILE IN FLAT BAD: A LOT
MOVING FROM LYING ON BACK TO SITTING ON SIDE OF FLAT BED WITH BEDRAILS: A LITTLE
HELP NEEDED FOR BATHING: A LOT
WALKING IN HOSPITAL ROOM: A LOT
DAILY ACTIVITIY SCORE: 20
STANDING UP FROM CHAIR USING ARMS: A LITTLE
TOILETING: A LITTLE
MOBILITY SCORE: 14
DRESSING REGULAR LOWER BODY CLOTHING: A LITTLE

## 2025-02-06 ASSESSMENT — PAIN - FUNCTIONAL ASSESSMENT
PAIN_FUNCTIONAL_ASSESSMENT: 0-10
PAIN_FUNCTIONAL_ASSESSMENT: 0-10

## 2025-02-06 ASSESSMENT — ACTIVITIES OF DAILY LIVING (ADL)
ADL_ASSISTANCE: INDEPENDENT
BATHING_ASSISTANCE: STAND BY
LACK_OF_TRANSPORTATION: NO

## 2025-02-06 ASSESSMENT — PAIN SCALES - GENERAL
PAINLEVEL_OUTOF10: 6
PAINLEVEL_OUTOF10: 0 - NO PAIN

## 2025-02-06 NOTE — HH CARE COORDINATION
Home Care received a Referral for Physical Therapy and Occupational Therapy. We have processed the referral for a Start of Care on 02/07.     If you have any questions or concerns, please feel free to contact us at [phone]. Follow the prompts, enter your five digit zip code, and you will be directed to your care team on [facility].

## 2025-02-06 NOTE — PROGRESS NOTES
Occupational Therapy    Evaluation/Treatment    Patient Name: Nina Oquendo  MRN: [id number]  Department:   Room: [address] [place] [address]  Today's Date: 02/06/25  Time Calculation  Start Time: 1135  Stop Time: 1205  Time Calculation (min): 30 min       Assessment:  OT Assessment: Pt demonstrated ADLs  Medical Staff Made Aware: Yes  End of Session Communication: Bedside nurse  End of Session Patient Position: Bed, 3 rail up, Alarm off, not on at start of session, Alarm off, caregiver present  Medical Staff Made Aware: Yes  Plan:  No Skilled OT: No acute OT goals identified  OT Frequency: OT eval only       Subjective   Current Problem:  1. Primary osteoarthritis of right hip  Referral to Home Health    acetaminophen (Tylenol) 325 mg tablet    aspirin 81 mg chewable tablet    celecoxib (CeleBREX) 200 mg capsule    cyclobenzaprine (Flexeril) 5 mg tablet    doxycycline (Monodox) 100 mg capsule    oxyCODONE (Roxicodone) 5 mg immediate release tablet    pantoprazole (ProtoNix) 40 mg EC tablet    sennosides (Senokot) 8.6 mg tablet    traMADol (Ultram) 50 mg tablet    Referral to Home Health        General:   OT Received On: 02/06/25  General  Reason for Referral: elective MARCELINO surgery  Referred By: CLARA ALONSO. ORT FOR RECENT SURG: MARCELINO, Posterior Hip Prec, WBAT  Past Medical History Relevant to Rehab: anxiety, COPD, T2DM, adrenal gland removed for CA 2023, cleft palate repair  Family/Caregiver Present: Yes  Caregiver Feedback: niece who will be postop caregiver for 2 nights, 45 minutes away  Prior to Session Communication: Bedside nurse  Patient Position Received: Bed, 3 rail up, Alarm off, not on at start of session, Alarm off, caregiver present  General Comment: pt alert and willing to particicpate in therapy session, neice fearful due to only being able to be with pt for 2 days post op   Precautions:  LE Weight Bearing Status: Weight Bearing as Tolerated  Post-Surgical Precautions: Right hip precautions  Precautions Comment: pt able to  recall all hip precautions with 1 vc            Pain:  Pain Assessment  Pain Assessment: 0-10  0-10 (Numeric) Pain Score: 6  Pain Type: Surgical pain  Pain Location: Hip  Pain Orientation: Right    Objective   Cognition:  Overall Cognitive Status: Within Functional Limits  Orientation Level: Oriented X4  Attention: Within Functional Limits  Memory: Within Funtional Limits  Insight: Mild  Impulsive: Mildly  Processing Speed: Within funtional limits           Home Living:  Type of Home: Mobile home  Lives With: Alone  Home Adaptive Equipment: Walker rolling or standard (toilet riser with rails)  Home Layout: One level  Home Access: Stairs to enter with rails  Entrance Stairs-Rails: Right  Entrance Stairs-Number of Steps: 5  Bathroom Shower/Tub: Walk-in shower  Bathroom Toilet: Adaptive toilet seating  Bathroom Equipment: Grab bars in shower, Grab bars around toilet  Bathroom Accessibility: pt would benefit from shower chair to increase safety and independence after discharge  Home Living Comments: family 45 minutes away  Prior Function:  Level of [place]: Independent with ADLs and functional transfers, Independent with homemaking with ambulation  Receives Help From: Family  ADL Assistance: Independent  Homemaking Assistance: Independent  Ambulatory Assistance: Independent  Prior Function Comments: has FWW as needed, does not drive     ADL:  Eating Assistance: Stand by (pt and family concerned about obtaining groceries/meals post op until pt able to complete, may need SW for MOW info or HHA)  Grooming Assistance: Stand by  Bathing Assistance: Stand by  Bathing Deficit:  (shower chair)  UE Dressing Assistance: Stand by  LE Dressing Assistance: Minimal (Mod Ind. with hip kit AE already obtained)  Toileting Assistance with Device: Stand by  Functional Assistance: Stand by  Functional Deficit:  (fww)  Activities of Daily Living: LE Dressing  LE Dressing: Yes  LE Dressing Adaptive Equipment: Reacher, Sock aide  Pants  Level of Assistance: Modified independent, Minimal verbal cues  Sock Level of Assistance: Modified independent, Minimal verbal cues  LE Dressing Where Assessed: Edge of bed  LE Dressing Comments: demonstrated using AE for LB dressing with verbal instruction, carmen present and reports pt has a hip kit at home.  Activity Tolerance:  Endurance: Endurance does not limit participation in activity     Bed Mobility/Transfers: Bed Mobility  Bed Mobility: Yes  Bed Mobility 1  Bed Mobility 1: Supine to sitting, Sitting to supine  Level of Assistance 1: Close supervision    Transfers  Transfer: Yes  Transfer 1  Transfer From 1: Sit to, Stand to  Transfer to 1: Stand, Sit  Technique 1: Sit to stand, Stand to sit  Transfer Device 1: Walker  Transfer Level of Assistance 1: Contact guard, Minimal verbal cues    Sitting Balance:  Dynamic Sitting Balance  Dynamic Sitting-Balance Support: Feet supported  Dynamic Sitting-Level of Assistance: Contact guard (LB dressing)     Therapy/Activity: Therapeutic Activity  Therapeutic Activity Performed: Yes  Therapeutic Activity 1: LB dressing, functional transfers, bed mobility    Vision:   Sensation:  Light Touch: No apparent deficits  Strength:  Strength Comments: BUEs grossly WFL  Hand Function:  Hand Function  Gross Grasp: Functional  Coordination: Functional  Extremities: RUE   RUE : Within Functional Limits and LUE   LUE: Within Functional Limits      Outcome Measures: [facility] Daily Activity  Putting on and taking off regular lower body clothing: A little  Bathing (including washing, rinsing, drying): A lot  Putting on and taking off regular upper body clothing: None  Toileting, which includes using toilet, bedpan or urinal: A little  Taking care of personal grooming such as brushing teeth: None  Eating Meals: None  Daily Activity - Total Score: 20        Education Documentation  Handouts, taught by Lindy Álvarez OT at 2/6/2025  1:53 PM.  Learner: Family, Patient  Readiness:  Acceptance  Method: Explanation, Demonstration, Handout  Response: Verbalizes Understanding, Demonstrated Understanding    Body Mechanics, taught by Lindy Álvarez OT at 2/6/2025  1:53 PM.  Learner: Family, Patient  Readiness: Acceptance  Method: Explanation, Demonstration, Handout  Response: Verbalizes Understanding, Demonstrated Understanding    Precautions, taught by Lindy Álvarez OT at 2/6/2025  1:53 PM.  Learner: Family, Patient  Readiness: Acceptance  Method: Explanation, Demonstration, Handout  Response: Verbalizes Understanding, Demonstrated Understanding    ADL Training, taught by Lindy Álvarez OT at 2/6/2025  1:53 PM.  Learner: Family, Patient  Readiness: Acceptance  Method: Explanation, Demonstration, Handout  Response: Verbalizes Understanding, Demonstrated Understanding    Education Comments  No comments found.        OP EDUCATION:       Goals:

## 2025-02-06 NOTE — PROGRESS NOTES
[facility] MEDICINE    Nina Oquendo is a 74 y.o. female on day 0 of admission presenting with Primary osteoarthritis of right hip.    Subjective   History Of Present Illness:  Nina Oquendo is a 74 y.o. 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 Who had  debilitating R hip pain due to osteoarthritis that worsened and was not improved with conservative therapy and underwent R MARCELINO today with Dr Sanchez. IM is consulted to assist with medical management of co morbidities. She is up on the floor has been up to [facility] and voided. She denies any complaints of chest pain, shortness of breath, urinary symptoms, head ache. No recent fever or chills. She is hungry and looking forward to eating.      Review of Systems   All pertinent positive symptoms are included in the history of present illness.   All other systems have been reviewed and are negative and noncontributory to this patient's current ailments and presentation.      2/6: Patient is sitting in bedside chair eating breakfast. She reports mild pain in her hip due to surgery. She denies chest pain, palpitations, fevers/chills, appetite changes, bowel changes or dysuria. She states she slept well last night and has been using a walker to walk with her nurses assistance.      Objective   Last Recorded Vitals  /61 (BP Location: Left arm, Patient Position: Lying)   Pulse 63   Temp 35.9 °C (96.7 °F) (Temporal)   Resp 18   Wt 57.6 kg (127 lb)   SpO2 94%     Intake/Output last 3 Shifts:    Intake/Output Summary (Last 24 hours) at 2/6/2025 0847  Last data filed at 2/6/2025 0648  Gross per 24 hour   Intake 2408.33 ml   Output 75 ml   Net 2333.33 ml       Admission Weight  Weight: 57.6 kg (127 lb) (02/05/25 0956)    Daily Weight  02/05/25 : 57.6 kg (127 lb)    Physical Exam:  Constitutional: Patient is sitting comfortably in chair, eating breakfast. NAD and non-toxic.     Head  and Face: Face is symmetric. Head is grossly normal with no step-offs    Neck: Supple    Lungs: Clear to auscultation bilaterally. Effort normal    Cardiovascular: Normal heart rate and leny.     Abdominal: Non-tender    Extremities: No swelling or erythema.    Neurological: Patient is A&Ox3. Speech is normal. No focal neural deficits.     Dermatologic: Normal sun and age related skin changes. Skin is warm and dry. Skin tone appropriate for ethnicity. No erythema or lesions noted.     Psychiatric/behavioral: Appropriate mood and affect. No auditory or visual hallucinations.       Assessment/Plan   OA R hip s/p R MARCELINO   IDDM2   HTN   HLD   GERD  DVT prophylaxis:     acetaminophen, 650 mg, oral, q6h CANDY  aspirin, 81 mg, oral, BID  atorvastatin, 40 mg, oral, Nightly  DULoxetine, 30 mg, oral, Daily  insulin lispro, 0-10 Units, subcutaneous, TID AC  ketorolac, 15 mg, intravenous, q6h  losartan, 50 mg, oral, Daily  metoprolol succinate XL, 50 mg, oral, Daily  pantoprazole, 40 mg, oral, Daily before breakfast  scopolamine, 1 patch, transdermal, q72h  sennosides, 2 tablet, oral, BID  lactated Ringer's, 100 mL/hr, Last Rate: 100 mL/hr (02/06/25 0648)  oxygen, 2 L/min  sodium chloride 0.9%, 20 mL/hr  PRN medications: bisacodyl, cyclobenzaprine, dextrose, dextrose, glucagon, glucagon, metoclopramide, naloxone, ondansetron, oxyCODONE  Check labs in A.M.   Please see orders for complete plan     Ivania CASTRO student

## 2025-02-06 NOTE — DISCHARGE SUMMARY
Discharge Diagnosis  Right Total Hip Replacement    Issues Requiring Follow-Up  Home care services to start within 48 hours. Outpatient PT to start per surgeon instructions.    Test Results Pending At Discharge  Pending Labs       No current pending labs.            Hospital Course  Patient underwent surgery for Right Total Hip Replacement without complications. Patient was then takent to the PACU in stabe condition. Patient was then transferred to the [facility].  Pain was appropriately controlled and weaned to oral medications. Diet was advanced as tolerated. PT/OT was consulted to begin on post operative day 0 and recommended Home for patient on discharge. Patient had uneventful hospital course. Patient is to follow-up in 3 weeks at scheduled post-op visit.      Face to Face following surgical procedure on 02/06/25. The patient was awake and alert. VSS, afebrile. Sensation intact bilaterally, sural/saph/sp/tibal n. Motor intact flexion/extension/DF/PF/EHL/FHL bilaterally. Palpable symmetric DP/PT pulse bilaterally.    Pertinent Physical Exam At Time of Discharge  Physical Exam  Vitals and nursing note reviewed. Exam conducted with a chaperone present.   Constitutional:       Appearance: Normal appearance.   HENT:      Head: Normocephalic and atraumatic.   Eyes:      Extraocular Movements: Extraocular movements intact.   Cardiovascular:      Rate and Rhythm: Normal rate and regular rhythm.      Pulses: Normal pulses.      Heart sounds: Normal heart sounds.   Pulmonary:      Effort: Pulmonary effort is normal.   Abdominal:      Palpations: Abdomen is soft.   Musculoskeletal:      Operative lower extremity in neutral alignment.  Hip dressing is clean, dry, intact.  Passive range of motion with mild discomfort, appropriate for postoperative timeframe.  Dermis is intact.  Neurovascular status is intact.  Dorsalis pedis pulses 2+, capillary refill <2 seconds.  Minimal swelling, no signs of compartment syndrome.  Negative  Homans.   Skin:     General: Skin is warm and dry.      Capillary Refill: Capillary refill takes less than 2 seconds.   Neurological:      General: No focal deficit present.      Mental Status: Patient is alert and oriented to person, place, and time. Mental status is at baseline.   Psychiatric:         Mood and Affect: Mood normal.         Thought Content: Thought content normal.         Judgment: Judgment normal.           Home Medications  Medication List      Your medication list        START taking these medications        Instructions Last Dose Given Next Dose Due   acetaminophen 325 mg tablet  Commonly known as: Tylenol      Take 3 tablets (975 mg) by mouth every 8 hours if needed for mild pain (1 - 3).       aspirin 81 mg chewable tablet      Chew 1 tablet (81 mg) 2 times a day. To prevent blood clots       celecoxib 200 mg capsule  Commonly known as: CeleBREX      Take 1 capsule (200 mg) by mouth 2 times a day. For pain, inflammation, and swelling       cyclobenzaprine 5 mg tablet  Commonly known as: Flexeril      Take 1 tablet (5 mg) by mouth 3 times a day as needed for muscle spasms for up to 10 days.       doxycycline 100 mg capsule  Commonly known as: Monodox      Take 1 capsule (100 mg) by mouth 2 times a day for 7 days. To prevent infection       oxyCODONE 5 mg immediate release tablet  Commonly known as: Roxicodone      Take 1 tablet (5 mg) by mouth every 6 hours if needed for severe pain (7 - 10) for up to 7 days.       pantoprazole 40 mg EC tablet  Commonly known as: ProtoNix      Take 1 tablet (40 mg) by mouth once daily as needed (heartburn). Do not crush, chew, or split.       sennosides 8.6 mg tablet  Commonly known as: Senokot      Take 1 tablet (8.6 mg) by mouth 2 times a day. To prevent constipation       traMADol 50 mg tablet  Commonly known as: Ultram      Take 1 tablet (50 mg) by mouth every 6 hours if needed for severe pain (7 - 10) for up to 7 days.              CONTINUE taking these  "medications        Instructions Last Dose Given Next Dose Due   atorvastatin 40 mg tablet  Commonly known as: Lipitor      Take 1 tablet (40 mg) by mouth once daily.       DULoxetine 30 mg DR capsule  Commonly known as: Cymbalta      Take 1 capsule (30 mg) by mouth once daily. Do not crush or chew.       FreeStyle Mukund 3 Sensor device  Generic drug: blood-glucose sensor      Apply under skin [id number]       insulin lispro 100 unit/mL injection  Commonly known as: HumaLOG      Inject 5 Units under the skin 3 times daily (morning, midday, late afternoon). Take as directed per insulin instructions       losartan 50 mg tablet  Commonly known as: Cozaar      Take 1 tablet (50 mg) by mouth once daily.       metoprolol succinate XL 50 mg 24 hr tablet  Commonly known as: Toprol-XL      Take 1 tablet (50 mg) by mouth once daily.       omeprazole 20 mg DR capsule  Commonly known as: PriLOSEC           ondansetron 4 mg tablet  Commonly known as: Zofran      Take 1 tablet (4 mg) by mouth every 8 hours if needed for nausea.       pen needle, diabetic 31 gauge x 5/16\" needle      1 each 3 times a day.              STOP taking these medications      chlorhexidine 0.12 % solution  Commonly known as: Peridex        dexAMETHasone 1 mg tablet  Commonly known as: Decadron        omeprazole OTC 20 mg EC tablet  Commonly known as: PriLOSEC OTC        Prodigy No Coding strip  Generic drug: blood sugar diagnostic                  Where to Get Your Medications        These medications were sent to [facility]  [address] [id number]      Hours: 8AM to 6PM Mon-Fri, 8AM to 4PM Sat-Sun Phone: [phone]   acetaminophen 325 mg tablet  aspirin 81 mg chewable tablet  celecoxib 200 mg capsule  cyclobenzaprine 5 mg tablet  doxycycline 100 mg capsule  oxyCODONE 5 mg immediate release tablet  pantoprazole 40 mg EC tablet  sennosides 8.6 mg tablet  traMADol 50 mg tablet             Outpatient Follow-Up  Patient to follow " up in three weeks in the clinic  Special Instructions    Please read discharge instructions provided by your surgeon before calling with questions as this will delay care.    Medication refills-Narcotic pain medication will be refilled every 7 days per state law. Please request refills through [facility] preferably/[phone]. All medication requests may take up to 72 hours to refill and refills after Friday 1pm will be refilled on the next business day.

## 2025-02-06 NOTE — PROGRESS NOTES
02/06/25 1017   Discharge Planning   Living Arrangements Alone   Support Systems Family members   Assistance Needed niece staying with her for a couple days post dc   Type of Residence Private residence   Care Facility Name home   Home or Post Acute Services In home services   Type of Home Care Services Home PT;Home OT   Expected Discharge Disposition Home H   Does the patient need discharge transport arranged? No   Financial Resource Strain   How hard is it for you to pay for the very basics like food, housing, medical care, and heating? Not hard   Housing Stability   In the last 12 months, was there a time when you were not able to pay the mortgage or rent on time? N   In the past 12 months, how many times have you moved where you were living? 1   At any time in the past 12 months, were you homeless or living in a shelter (including now)? N   Transportation Needs   In the past 12 months, has lack of transportation kept you from medical appointments or from getting medications? no   In the past 12 months, has lack of transportation kept you from meetings, work, or from getting things needed for daily living? No   Patient Choice   Provider Choice list and CMS website (https://medicare.gov/care-compare#search) for post-acute Quality and Resource Measure Data were provided and reviewed with: Patient   Patient / Family choosing to utilize agency / facility established prior to hospitalization No   Stroke Family Assessment   Stroke Family Assessment Needed No   Intensity of Service   Intensity of Service 0-30 min     Called and spoke with patient. Confirmed address and contacts. Patient lives at home alone, drives typically, has a walker, and is typically independent at home. She plans to return home at dc. States her niece will be staying with her at dc for a couple days. She is now aware that [facility] referral is placed and she is okay with this. Denies any other dc needs. Plan for dc home today.     1120 Per [facility]  intake, SOC is 2/7    1150 Received a message from nursing that patient is requesting information about receiving meals at home and also an aide service. Requested ortho add aide to HC orders and requested LSW provide patient a list of private duty aides as well as provide info on meals at home.

## 2025-02-06 NOTE — NURSING NOTE
Pt and pt's niece given discharge instructions and education, both verbalize understanding. IV removed, cath tip intact, pt tolerated well. Pt changed into own clothing, pt states she is leaving with all personal belongings now. Pt denies further needs. Pt taken via wheelchair now to front entrance, pt to be driven to home by niece now. Pt leaving in fair condition, VS stable.

## 2025-02-06 NOTE — CARE PLAN
Problem: Fall/Injury  Goal: Not fall by end of shift  Outcome: Met  Goal: Be free from injury by end of the shift  Outcome: Met  Goal: Verbalize understanding of personal risk factors for fall in the hospital  Outcome: Met  Goal: Verbalize understanding of risk factor reduction measures to prevent injury from fall in the home  Outcome: Met  Goal: Use assistive devices by end of the shift  Outcome: Met  Goal: Pace activities to prevent fatigue by end of the shift  Outcome: Met     Problem: Skin  Goal: Decreased wound size/increased tissue granulation at next dressing change  Outcome: Progressing  Goal: Participates in plan/prevention/treatment measures  Outcome: Progressing      The clinical goals for the shift include Kami will remain neuro and maintained posterior hip precaution this shift.

## 2025-02-06 NOTE — PROGRESS NOTES
Physical Therapy    Physical Therapy Treatment    Patient Name: Nina Oquendo  MRN: [id number]  Department: [address]  Room: [address] [place] [address]  Today's Date: 2/6/2025  Time Calculation  Start Time: 0710  Stop Time: 0748  Time Calculation (min): 38 min         Assessment/Plan   PT Assessment  End of Session Communication: Bedside nurse  Assessment Comment: patient requiring mutiple verbal and tactile cues for safety and to follow directions correctly. tries to leave FWW too far in front tries to leave FWW. left Le appears weak  End of Session Patient Position: Up in chair, Alarm on  PT Plan  Inpatient/Swing Bed or Outpatient: Inpatient  PT Plan  Treatment/Interventions: Bed mobility, Transfer training, Gait training, Stair training, Home exercise program  PT Plan: Ongoing PT, OT consult  PT Frequency: BID  PT Discharge Recommendations: Low intensity level of continued care  Equipment Recommended upon Discharge: Bedside commode, Other (comment) (shower bench)  PT Recommended Transfer Status: Assist x1  PT - OK to Discharge: Yes      General Visit Information:   PT  Visit  PT Received On: 02/06/25  Response to Previous Treatment: Patient reporting fatigue but able to participate.  General  Prior to Session Communication: Bedside nurse  Patient Position Received: Bed, 3 rail up, Alarm off, not on at start of session  General Comment: patient in bed agreeable to therapy.   Subjective .  Precautions: hip precautions,                Objective   Pain:  Pain Assessment  Pain Assessment: 0-10  0-10 (Numeric) Pain Score: 0 - No pain (patient states no pain)  Cognition:  Cognition  Orientation Level: Oriented X4 (but very forgetful)    Activity Tolerance:  Activity Tolerance  Endurance: Tolerates 30 min exercise with multiple rests  Treatments:  patient performed COLLEEN LE  ankle pumps, glute sets, quad sets,heel slides, hip abduction and SAQ, 20 reps each with self assist to min assist and verbal cues to perform correctly. bed mobility  with  verbal ascend min assist for correct  procedure. sit to stand with min assist and verbal cues. gait training with FWW 60 feet with min assist and verbal cues. ascend/ descend 3 steps with min assist  and verbal cues. for correct sequence. min assist for on and off commode with FWw stand to sit with min assist.    Outcome Measures:  [facility] Basic Mobility  Turning from your back to your side while in a flat bed without using bedrails: A little  Moving from lying on your back to sitting on the side of a flat bed without using bedrails: A lot  Moving to and from bed to chair (including a wheelchair): A lot  Standing up from a chair using your arms (e.g. wheelchair or bedside chair): A little  To walk in hospital room: A lot  Climbing 3-5 steps with railing: A lot  Basic Mobility - Total Score: 14    Education Documentation  Precautions, taught by Catie Traylor PTA at 2/6/2025  8:25 AM.  Learner: Patient  Readiness: Acceptance  Method: Explanation  Response: Verbalizes Understanding    Home Exercise Program, taught by Catie Traylor PTA at 2/6/2025  8:25 AM.  Learner: Patient  Readiness: Acceptance  Method: Explanation  Response: Verbalizes Understanding    Mobility Training, taught by Catie Traylor PTA at 2/6/2025  8:25 AM.  Learner: Patient  Readiness: Acceptance  Method: Explanation  Response: Verbalizes Understanding    Education Comments  No comments found.        OP EDUCATION:       Encounter Problems       Encounter Problems (Active)       HEP       Pt will perform 10+ reps AAROM/AROM RLE to improve functional strength needed for improved mobility following HEP (Progressing)       Start:  02/05/25    Expected End:  02/19/25               Mobility       STG - Patient will ambulate household distance using WALKER (not cane) and no more than SUPV assist, demo CORRECT GAIT SEQUENCING (Progressing)       Start:  02/05/25    Expected End:  02/19/25            STG - Patient will navigate 3-5 steps with rail + mod  x1 assist (Progressing)       Start:  02/05/25    Expected End:  02/19/25               PT Transfers       STG - Patient will follow hip precautions during transfers using WALKER and SUPV assist (Progressing)       Start:  02/05/25    Expected End:  02/19/25               Pain - Adult          Safety

## 2025-02-06 NOTE — PROGRESS NOTES
Social work consult placed for discharge planning. SW reviewed pt's chart and communicated with TCC. No SW needs foreseen at this time. SW signing off; available upon request.    CHEIKH Wooten ([id number])   Care Transitions    1221  SW notified by remote TCC, pt/pt's niece interested in private duty caregiver and mobile meal resources. SW met with pt and pt's niece at bedside to assess needs and provide support, introduced self and my role as  with care transition team. SW provided mobile meal list, private duty caregiver and [facility]. Pt interested in referral to [facility] and SW will place today. Pt inquired about shower chair and SW explained is not something typically covered by insurance and would need to purchase in store. Pt/ pt's niece expressed understanding and were appreciative of SW support. No other needs identified at this time, SW signing off,  pt and care team aware of SW availability while inpt.    CHEIKH Wooten ([id number])   Care Transitions

## 2025-02-07 ENCOUNTER — HOME CARE VISIT (OUTPATIENT)
Dept: HOME HEALTH SERVICES | Facility: HOME HEALTH | Age: 75
End: 2025-02-07
Payer: MEDICARE

## 2025-02-07 VITALS
TEMPERATURE: 98.8 F | OXYGEN SATURATION: 97 % | SYSTOLIC BLOOD PRESSURE: 108 MMHG | DIASTOLIC BLOOD PRESSURE: 60 MMHG | HEART RATE: 77 BPM

## 2025-02-07 PROCEDURE — G0152 HHCP-SERV OF OT,EA 15 MIN: HCPCS | Mod: HHH

## 2025-02-07 PROCEDURE — 169592 NO-PAY CLAIM PROCEDURE

## 2025-02-07 PROCEDURE — G0151 HHCP-SERV OF PT,EA 15 MIN: HCPCS | Mod: HHH

## 2025-02-07 ASSESSMENT — ACTIVITIES OF DAILY LIVING (ADL)
ADLS_COMMENTS: PLUG IN THE RAZOR WITHOUT HELP, AS WELL AS RETRIEVE IT FROM THE DRAWER OR CABINET. A FEMALE PATIENT MUST APPLY HER OWN MAKE-UP, IF USED, BUT NEED NOT BRAID OR STYLE HER HAIR.     FEEDING    0    DEPENDENT IN ALL ASPECTS AND NEEDS TO BE FED, NASOGAST
GROOMING_WITHIN_DEFINED_LIMITS: 1
ENTERING_EXITING_HOME: NEEDS ASSISTANCE
WASHING_UPB_CURRENT_FUNCTION: MINIMUM ASSIST
ADLS_COMMENTS: NAL HYGIENE AND IS DEPENDENT IN ALL ASPECTS.   1    ASSISTANCE IS REQUIRED IN ALL STEPS OF PERSONAL HYGIENE, BUT PATIENT ABLE TO MAKE SOME CONTRIBUTION.   3    SOME ASSISTANCE IS REQUIRED IN ONE OR MORE STEPS OF PERSONAL HYGIENE.   4    PATIENT IS AB
WASHING_LB_CURRENT_FUNCTION: MINIMUM ASSIST
ADLS_COMMENTS: ES, CANES, OR A WALKARETTE, AND WALK 50 METRES WITHOUT HELP OR SUPERVISION.     AMBULATION/WHEELCHAIR * (IF UNABLE TO WALK) ONLY USE THIS ITEM IF THE PATIENT IS RATED “0” FOR AMBULATION, AND THEN ONLY IF THE PATIENT HAS BEEN TRAINED IN WHEELCHAIR MAN
ADLS_COMMENTS: AND NIGHT, AND/OR IS INDEPENDENT WITH INTERNAL OR EXTERNAL DEVICES.     BATHING   0    TOTAL DEPENDENCE IN BATHING SELF.   1    ASSISTANCE IS REQUIRED IN ALL ASPECTS OF BATHING, BUT PATIENT IS ABLE TO MAKE SOME CONTRIBUTION.   3    ASSISTANCE IS REQ
ADLS_COMMENTS: NG   0    THE PATIENT IS UNABLE TO CLIMB STAIRS.   2    ASSISTANCE IS REQUIRED IN ALL ASPECTS OF CHAIR CLIMBING, INCLUDING ASSISTANCE WITH WALKING AIDS.   5    THE PATIENT IS ABLE TO ASCEND/DESCEND BUT IS UNABLE TO CARRY WALKING AIDS AND NEEDS SUPERV
ADLS_COMMENTS: TINENCE AIDS SUCH AS PAD, ETC. THE PATIENT MAY REQUIRE SUPERVISION WITH THE USE OF SUPPOSITORY OR ENEMA AND HAS OCCASIONAL ACCIDENTS.   10   THE PATIENT CAN CONTROL BOWELS AND HAS NO ACCIDENTS, CAN USE SUPPOSITORY, OR TAKE AN ENEMA WHEN NECESSARY.
ADLS_COMMENTS: CHAIR/BED TRANSFERS  0    UNABLE TO PARTICIPATE IN A TRANSFER. TWO ATTENDANTS ARE REQUIRED TO TRANSFER THE PATIENT WITH OR WITHOUT A MECHANICAL DEVICE.   3    ABLE TO PARTICIPATE BUT MAXIMUM ASSISTANCE OF ONE OTHER PERSON IS REQUIRE IN ALL ASPECTS OF
ADLS_COMMENTS: AIR, TRANSFER BACK INTO IT SAFELY AND/OR GRASP AID AND STAND. THE PATIENT MUST BE INDEPENDENT IN ALL PHASES OF THIS ACTIVITY.     AMBULATION    0    DEPENDENT IN AMBULATION  3    CONSTANT PRESENCE OF ONE OR MORE ASSISTANT IS REQUIRED DURING AMBULATIO
ADLS_COMMENTS: BLADDER CONTROL   0    THE PATIENT IS DEPENDENT IN BLADDER MANAGEMENT, IS INCONTINENT, OR HAS INDWELLING CATHETER.   2    THE PATIENT IS INCONTINENT BUT IS ABLE TO ASSIST WITH THE APPLICATION OF AN INTERNAL OR EXTERNAL DEVICE.   5    THE PATIENT IS
ADLS_COMMENTS: 5    TO PROPEL WHEELCHAIR INDEPENDENTLY, THE PATIENT MUST BE ABLE TO GO AROUND CORNERS, TURN AROUND, MANOEUVRE THE CHAIR TO A TABLE, BED, TOILET, ETC. THE PATIENT MUST BE ABLE TO PUSH A CHAIR AT LEAST 50 METRES AND NEGOTIATE KERB.       STAIR CLIMBI
ADLS_COMMENTS: POSITION, AND WITH BOWEL MOVEMENT FACILITATORY TECHNIQUES.   5   THE PATIENT CAN ASSUME APPROPRIATE POSITION, BUT CANNOT USE FACILITATORY TECHNIQUES OR CLEAN SELF WITHOUT ASSISTANCE AND HAS FREQUENT ACCIDENTS.   8    ASSISTANCE IS REQUIRED WITH INCON
ADLS_COMMENTS: ISION AND ASSISTANCE.   8    GENERALLY NO ASSISTANCE IS REQUIRED. AT TIMES SUP IS REQUIRED FOR SAFETY DUE TO MORNING STIFFNESS, SOB, ETC  10   THE PATIENT IS ABLE TO GO UP/DOWN A FLIGHT OF STAIRS SAFELY WITHOUT HELP OR SUPERVISION,USE HAND RAILS, CAN
WASHING_HAIR_CURRENT_FUNCTION: MINIMUM ASSIST
OASIS_M1830: 03
ADLS_COMMENTS: RIC NEEDS TO BE ADMINISTERED.   2    CAN MANIPULATE AN EATING DEVICE, USUALLY A SPOON, BUT SOMEONE MUST PROVIDE ACTIVE ASSISTANCE DURING THE MEAL.   5    ABLE TO FEED SELF WITH SUPERVISION. ASSISTANCE IS REQUIRED WITH ASSOCIATED TASKS SUCH AS PUTTING
ADLS_COMMENTS: AGEMENT.   0    DEPENDENT IN WHEELCHAIR AMBULATION.   1    PATIENT CAN PROPEL SELF SHORT DISTANCES ON FLAT SURFACE, BUT ASSISTANCE IS REQUIRED FOR ALL OTHER STEPS OF WHEELCHAIR MANAGEMENT.  3    PRESENCE OF ONE PERSON IS NECESSARY AND CONSTANT ASSIST
ADLS_COMMENTS: UIRED WITH EITHER TRANSFER TO SHOWER/BATH OR WITH WASHING OR DRYING; INCLUDING INABILITY TO COMPLETE A TASK BECAUSE OF CONDITION OR DISEASE, ETC.   4    SUPERVISION IS REQUIRED FOR SAFETY IN ADJUSTING THE WATER TEMPERATURE, OR IN THE TRANSFER.   5
ADLS_COMMENTS: N.   8    ASSISTANCE IS REQUIRED WITH REACHING AIDS AND/OR THEIR MANIPULATION. ONE PERSON IS REQUIRED TO OFFER ASSISTANCE.   12   THE PATIENT IS INDEPENDENT IN AMBULATION BUT UNABLE TO WALK 50 METRES WITHOUT HELP, OR SUPERVISION IS NEEDED FOR CONFIDE
ADLS_COMMENTS: MILK/SUGAR INTO TEA, SALT, PEPPER, SPREADING BUTTER, TURNING A PLATE OR OTHER “SET UP” ACTIVITIES.   8    INDEP WITH PREPARED TRAY, MAY NEED MEAT CUT, MILK CARTON/JAR LID OPENED. ANOTHER PERSON IS NOT REQUIRED  10   THE PATIENT CAN FEED SELF FROM A
ADLS_COMMENTS: OF DRESSING AND IS UNABLE TO PARTICIPATE IN THE ACTIVITY.   2     THE PATIENT IS ABLE TO PARTICIPATE TO SOME DEGREE, BUT IS DEPENDENT IN ALL ASPECTS OF DRESSING.   5     ASSISTANCE IS NEEDED IN PUTTING ON, AND/OR REMOVING ANY CLOTHING.   8     ONLY M
AMBULATION_DISTANCE/DURATION_TOLERATED: WALKER
ADLS_COMMENTS: ANCE IS REQUIRED TO MANIPULATE CHAIR TO TABLE, BED, ETC.   4    THE PATIENT CAN PROPEL SELF FOR A REASONABLE DURATION OVER REGULARLY ENCOUNTERED TERRAIN. MINIMAL ASSISTANCE MAY STILL BE REQUIRED IN “TIGHT CORNERS” OR TO NEGOTIATE A KERB 100MM HIGH.
ADLS_COMMENTS: E TO WALK OR USE WHEELCHAIR INDEPENDENTLY.
ADLS_COMMENTS: ET PAPER WITHOUT HELP. IF NECESSARY, THE PATIENT MAY USE A BED PAN OR COMMODE OR URINAL AT NIGHT, BUT MUST BE ABLE TO EMPTY IT AND CLEAN IT.     BOWEL CONTROL   0   THE PATIENT IS BOWEL INCONTINENT.   2   THE PATIENT NEEDS HELP TO ASSUME APPROPRIATE
ADLS_COMMENTS: GENERALLY DRY BY DAY, BUT NOT AT NIGHT AND NEEDS SOME ASSISTANCE WITH THE DEVICES.   8    GENERALLY DRY BY DAY AND NIGHT, MAY HAVE OCCAS ACCIDENT OR NEED MIN ASSIST WITH INTERNAL OR EXTERNAL DEVICES.   10   THE PATIENT IS ABLE TO CONTROL BLADDER DAY
ADLS_COMMENTS: RE WHERE PATIENTS MOVE FROM DEPENDENCY TO ASSISTED INDEPENDENCE.   60 - 80    IF LIVING ALONE WILL PROBABLY NEED A NUMBER OF COMMUNITY SERVICES TO COPE.   MORE THAN 85     LIKELY TO BE DISCHARGED TO COMMUNITY LIVING - INDEPENDENT IN TRANSFERS AND ABL
AMBULATION ASSISTANCE ON FLAT SURFACES: 1
ADLS_COMMENTS: LE TO CONDUCT OWN PERSONAL HYGIENE BUT REQUIRES MIN ASSIST BEFORE AND/OR AFTER THE OPERATION.   5    THE PATIENT CAN WASH HIS/HER HANDS AND FACE, COMB HAIR, CLEAN TEETH AND SHAVE. A MALE PATIENT MAY USE ANY KIND OF RAZOR BUT MUST INSERT THE BLADE, OR
ADLS_COMMENTS: THE PATIENT MAY USE A BATHTUB, A SHOWER, OR TAKE A COMPLETE SPONGE BATH. THE PATIENT MUST BE ABLE TO DO ALL THE STEPS OF WHICHEVER METHOD IS EMPLOYED WITHOUT ANOTHER PERSON BEING PRESENT.     DRESSING   0     THE PATIENT IS DEPENDENT IN ALL ASPECTS
ADLS_COMMENTS: EMENT OF CLOTHING, TRANSFERRING, OR WASHING HANDS.   8    SUP MAY BE REQUIRED FOR SAFETY WITH NORMAL TOILET. BSC MAY BE USED AT NIGHT, ASSIST FOR EMPTYING AND CLEANING.   10   THE PATIENT IS ABLE TO GET ON/OFF THE TOILET, FASTEN CLOTHING AND USE TOIL
FEEDING_WITHIN_DEFINED_LIMITS: 1
ADLS_COMMENTS: NCE OR SAFETY IN HAZARDOUS SITUATIONS.   15   THE PATIENT MUST BE ABLE TO WEAR BRACES IF REQUIRED, LOCK AND UNLOCK THESE BRACES ASSUME STANDING POSITION, SIT DOWN, AND PLACE THE NECESSARY AIDS INTO POSITION FOR USE. THE PATIENT MUST BE ABLE TO CRUTCH
ADLS_COMMENTS: THE TRANSFER.   8    THE TRANSFER REQUIRES THE ASSISTANCE OF ONE OTHER PERSON. ASSISTANCE MAY BE REQUIRED IN ANY ASPECT OF THE TRANSFER.   12  THE PRESENCE OF ANOTHER PERSON IS REQUIRED EITHER AS A CONFIDENCE MEASURE, OR TO PROVIDE SUPERVISION FOR S
ADLS_COMMENTS: INIMAL ASSISTANCE IS REQUIRED WITH FASTENING CLOTHING SUCH AS BUTTONS, ZIPS, BRA, SHOES, ETC.   10   THE PATIENT IS ABLE TO PUT ON, REMOVE, CORSET, BRACES, AS PRESCRIBED.     PERSONAL HYGIENE (GROOMING)   0    THE PATIENT IS UNABLE TO ATTEND TO PERSO
ADLS_COMMENTS: AFETY.   15  THE PATIENT CAN SAFELY APPROACH THE BED WALKING OR IN A WHEELCHAIR, LOCK BRAKES, LIFT FOOTRESTS, OR POSITION WALKING AID, MOVE SAFELY TO BED, LIE DOWN, COME TO A SITTING POSITION ON THE SIDE OF THE BED, CHANGE THE POSITION OF THE WHEELCH

## 2025-02-07 ASSESSMENT — ENCOUNTER SYMPTOMS
PAIN LOCATION - PAIN DURATION: ACUTE
PERSON REPORTING PAIN: PATIENT
PAIN LOCATION - RELIEVING FACTORS: REST, ICE, MEDS.
PAIN LOCATION - PAIN QUALITY: ACHING
PAIN LOCATION - PAIN SEVERITY: 8/10
HIGHEST PAIN SEVERITY IN PAST 24 HOURS: 10/10
PAIN: 1
PAIN LOCATION - PAIN SEVERITY: 5/10
PAIN LOCATION: RIGHT HIP
PAIN LOCATION - PAIN FREQUENCY: FREQUENT
PAIN: 1
PERSON REPORTING PAIN: PATIENT
PAIN LOCATION: RIGHT HIP
PAIN LOCATION - EXACERBATING FACTORS: ACTIVITY
LOWEST PAIN SEVERITY IN PAST 24 HOURS: 5/10

## 2025-02-10 ENCOUNTER — HOME CARE VISIT (OUTPATIENT)
Dept: HOME HEALTH SERVICES | Facility: HOME HEALTH | Age: 75
End: 2025-02-10
Payer: MEDICARE

## 2025-02-10 PROCEDURE — G0157 HHC PT ASSISTANT EA 15: HCPCS | Mod: CQ,HHH

## 2025-02-11 ENCOUNTER — HOME CARE VISIT (OUTPATIENT)
Dept: HOME HEALTH SERVICES | Facility: HOME HEALTH | Age: 75
End: 2025-02-11
Payer: MEDICARE

## 2025-02-11 VITALS
HEART RATE: 94 BPM | SYSTOLIC BLOOD PRESSURE: 115 MMHG | DIASTOLIC BLOOD PRESSURE: 70 MMHG | TEMPERATURE: 98.2 F | OXYGEN SATURATION: 97 %

## 2025-02-11 PROCEDURE — G0152 HHCP-SERV OF OT,EA 15 MIN: HCPCS | Mod: HHH

## 2025-02-11 ASSESSMENT — ACTIVITIES OF DAILY LIVING (ADL)
CURRENT_FUNCTION: INDEPENDENT
PHYSICAL TRANSFERS ASSESSED: 1
DRESSING_LB_CURRENT_FUNCTION: SUPERVISION

## 2025-02-11 ASSESSMENT — ENCOUNTER SYMPTOMS
PAIN: 1
PERSON REPORTING PAIN: PATIENT
PAIN LOCATION: RIGHT HIP
PAIN LOCATION - RELIEVING FACTORS: REST
PAIN LOCATION - PAIN DURATION: ACUTE
PAIN LOCATION - PAIN FREQUENCY: FREQUENT
PAIN LOCATION - EXACERBATING FACTORS: ACTIVITY
PAIN LOCATION - PAIN QUALITY: ACHING
PAIN LOCATION - PAIN SEVERITY: 5/10

## 2025-02-12 ENCOUNTER — HOME CARE VISIT (OUTPATIENT)
Dept: HOME HEALTH SERVICES | Facility: HOME HEALTH | Age: 75
End: 2025-02-12
Payer: MEDICARE

## 2025-02-12 PROCEDURE — G0157 HHC PT ASSISTANT EA 15: HCPCS | Mod: CQ,HHH

## 2025-02-13 ENCOUNTER — HOME CARE VISIT (OUTPATIENT)
Dept: HOME HEALTH SERVICES | Facility: HOME HEALTH | Age: 75
End: 2025-02-13
Payer: MEDICARE

## 2025-02-13 VITALS
DIASTOLIC BLOOD PRESSURE: 70 MMHG | HEART RATE: 76 BPM | TEMPERATURE: 97.4 F | SYSTOLIC BLOOD PRESSURE: 128 MMHG | OXYGEN SATURATION: 97 %

## 2025-02-13 PROCEDURE — G0152 HHCP-SERV OF OT,EA 15 MIN: HCPCS | Mod: HHH

## 2025-02-13 RX ADMIN — ONDANSETRON 4 MG: 4 TABLET, FILM COATED ORAL at 15:35

## 2025-02-13 ASSESSMENT — ENCOUNTER SYMPTOMS
DENIES PAIN: 1
PERSON REPORTING PAIN: PATIENT

## 2025-02-13 ASSESSMENT — ACTIVITIES OF DAILY LIVING (ADL)
BATHING_CURRENT_FUNCTION: CONTACT GUARD ASSIST
BATHING ASSESSED: 1
BATHING EQUIPMENT USED: SHOWER CHAIR
DRESSING_LB_CURRENT_FUNCTION: SUPERVISION
DRESSING_UB_CURRENT_FUNCTION: INDEPENDENT

## 2025-02-17 ENCOUNTER — HOME CARE VISIT (OUTPATIENT)
Dept: HOME HEALTH SERVICES | Facility: HOME HEALTH | Age: 75
End: 2025-02-17
Payer: MEDICARE

## 2025-02-17 PROCEDURE — G0157 HHC PT ASSISTANT EA 15: HCPCS | Mod: CQ,HHH

## 2025-02-18 ENCOUNTER — HOME CARE VISIT (OUTPATIENT)
Dept: HOME HEALTH SERVICES | Facility: HOME HEALTH | Age: 75
End: 2025-02-18
Payer: MEDICARE

## 2025-02-18 VITALS
TEMPERATURE: 97.4 F | OXYGEN SATURATION: 99 % | SYSTOLIC BLOOD PRESSURE: 126 MMHG | HEART RATE: 66 BPM | DIASTOLIC BLOOD PRESSURE: 75 MMHG

## 2025-02-18 PROCEDURE — G0152 HHCP-SERV OF OT,EA 15 MIN: HCPCS | Mod: HHH

## 2025-02-18 ASSESSMENT — ACTIVITIES OF DAILY LIVING (ADL)
ADLS_COMMENTS: UIRED WITH EITHER TRANSFER TO SHOWER/BATH OR WITH WASHING OR DRYING; INCLUDING INABILITY TO COMPLETE A TASK BECAUSE OF CONDITION OR DISEASE, ETC.   4    SUPERVISION IS REQUIRED FOR SAFETY IN ADJUSTING THE WATER TEMPERATURE, OR IN THE TRANSFER.   5
ADLS_COMMENTS: RIC NEEDS TO BE ADMINISTERED.   2    CAN MANIPULATE AN EATING DEVICE, USUALLY A SPOON, BUT SOMEONE MUST PROVIDE ACTIVE ASSISTANCE DURING THE MEAL.   5    ABLE TO FEED SELF WITH SUPERVISION. ASSISTANCE IS REQUIRED WITH ASSOCIATED TASKS SUCH AS PUTTING
ADLS_COMMENTS: ET PAPER WITHOUT HELP. IF NECESSARY, THE PATIENT MAY USE A BED PAN OR COMMODE OR URINAL AT NIGHT, BUT MUST BE ABLE TO EMPTY IT AND CLEAN IT.     BOWEL CONTROL   0   THE PATIENT IS BOWEL INCONTINENT.   2   THE PATIENT NEEDS HELP TO ASSUME APPROPRIATE
ADLS_COMMENTS: THE TRANSFER.   8    THE TRANSFER REQUIRES THE ASSISTANCE OF ONE OTHER PERSON. ASSISTANCE MAY BE REQUIRED IN ANY ASPECT OF THE TRANSFER.   12  THE PRESENCE OF ANOTHER PERSON IS REQUIRED EITHER AS A CONFIDENCE MEASURE, OR TO PROVIDE SUPERVISION FOR S
ADLS_COMMENTS: AIR, TRANSFER BACK INTO IT SAFELY AND/OR GRASP AID AND STAND. THE PATIENT MUST BE INDEPENDENT IN ALL PHASES OF THIS ACTIVITY.     AMBULATION    0    DEPENDENT IN AMBULATION  3    CONSTANT PRESENCE OF ONE OR MORE ASSISTANT IS REQUIRED DURING AMBULATIO
WASHING_UPB_CURRENT_FUNCTION: INDEPENDENT
ADLS_COMMENTS: E TO WALK OR USE WHEELCHAIR INDEPENDENTLY.
WASHING_LB_CURRENT_FUNCTION: INDEPENDENT
ADLS_COMMENTS: POSITION, AND WITH BOWEL MOVEMENT FACILITATORY TECHNIQUES.   5   THE PATIENT CAN ASSUME APPROPRIATE POSITION, BUT CANNOT USE FACILITATORY TECHNIQUES OR CLEAN SELF WITHOUT ASSISTANCE AND HAS FREQUENT ACCIDENTS.   8    ASSISTANCE IS REQUIRED WITH INCON
ADLS_COMMENTS: NG   0    THE PATIENT IS UNABLE TO CLIMB STAIRS.   2    ASSISTANCE IS REQUIRED IN ALL ASPECTS OF CHAIR CLIMBING, INCLUDING ASSISTANCE WITH WALKING AIDS.   5    THE PATIENT IS ABLE TO ASCEND/DESCEND BUT IS UNABLE TO CARRY WALKING AIDS AND NEEDS SUPERV
ADLS_COMMENTS: WHERE PATIENTS MOVE FROM DEPENDENCY TO ASSISTED INDEPENDENCE.   60 - 80    IF LIVING ALONE WILL PROBABLY NEED A NUMBER OF COMMUNITY SERVICES TO COPE.   **MORE THAN 85     LIKELY TO BE DISCHARGED TO COMMUNITY LIVING - INDEPENDENT IN TRANSFERS AND ABL
WASHING_HAIR_CURRENT_FUNCTION: INDEPENDENT
ADLS_COMMENTS: ANCE IS REQUIRED TO MANIPULATE CHAIR TO TABLE, BED, ETC.   4    THE PATIENT CAN PROPEL SELF FOR A REASONABLE DURATION OVER REGULARLY ENCOUNTERED TERRAIN. MINIMAL ASSISTANCE MAY STILL BE REQUIRED IN “TIGHT CORNERS” OR TO NEGOTIATE A KERB 100MM HIGH.
ADLS_COMMENTS: CHAIR/BED TRANSFERS  0    UNABLE TO PARTICIPATE IN A TRANSFER. TWO ATTENDANTS ARE REQUIRED TO TRANSFER THE PATIENT WITH OR WITHOUT A MECHANICAL DEVICE.   3    ABLE TO PARTICIPATE BUT MAXIMUM ASSISTANCE OF ONE OTHER PERSON IS REQUIRE IN ALL ASPECTS OF
ADLS_COMMENTS: NCE OR SAFETY IN HAZARDOUS SITUATIONS.   15   THE PATIENT MUST BE ABLE TO WEAR BRACES IF REQUIRED, LOCK AND UNLOCK THESE BRACES ASSUME STANDING POSITION, SIT DOWN, AND PLACE THE NECESSARY AIDS INTO POSITION FOR USE. THE PATIENT MUST BE ABLE TO CRUTCH
GROOMING_WITHIN_DEFINED_LIMITS: 1
ADLS_COMMENTS: BLADDER CONTROL   0    THE PATIENT IS DEPENDENT IN BLADDER MANAGEMENT, IS INCONTINENT, OR HAS INDWELLING CATHETER.   2    THE PATIENT IS INCONTINENT BUT IS ABLE TO ASSIST WITH THE APPLICATION OF AN INTERNAL OR EXTERNAL DEVICE.   5    THE PATIENT IS
ADLS_COMMENTS: GENERALLY DRY BY DAY, BUT NOT AT NIGHT AND NEEDS SOME ASSISTANCE WITH THE DEVICES.   8    GENERALLY DRY BY DAY AND NIGHT, MAY HAVE OCCAS ACCIDENT OR NEED MIN ASSIST WITH INTERNAL OR EXTERNAL DEVICES.   10   THE PATIENT IS ABLE TO CONTROL BLADDER DAY
ADLS_COMMENTS: 5    TO PROPEL WHEELCHAIR INDEPENDENTLY, THE PATIENT MUST BE ABLE TO GO AROUND CORNERS, TURN AROUND, MANOEUVRE THE CHAIR TO A TABLE, BED, TOILET, ETC. THE PATIENT MUST BE ABLE TO PUSH A CHAIR AT LEAST 50 METRES AND NEGOTIATE KERB.       STAIR CLIMBI
ADLS_COMMENTS: THE PATIENT MAY USE A BATHTUB, A SHOWER, OR TAKE A COMPLETE SPONGE BATH. THE PATIENT MUST BE ABLE TO DO ALL THE STEPS OF WHICHEVER METHOD IS EMPLOYED WITHOUT ANOTHER PERSON BEING PRESENT.     DRESSING   0     THE PATIENT IS DEPENDENT IN ALL ASPECTS
ADLS_COMMENTS: TINENCE AIDS SUCH AS PAD, ETC. THE PATIENT MAY REQUIRE SUPERVISION WITH THE USE OF SUPPOSITORY OR ENEMA AND HAS OCCASIONAL ACCIDENTS.   10   THE PATIENT CAN CONTROL BOWELS AND HAS NO ACCIDENTS, CAN USE SUPPOSITORY, OR TAKE AN ENEMA WHEN NECESSARY.
ADLS_COMMENTS: EVERE DEPENDENCE     21 - 60  MODERATE DEPENDENCE     61 - 90  **SLIGHT DEPENDENCE      91 - 99  INDEPENDENCE        100    SCORE PREDICTION    LESS THAN 40    UNLIKELY TO GO HOME - DEPENDENT IN MOBILITY - DEPENDENT IN SELF CARE   60    PIVOTAL SCORE
ADLS_COMMENTS: EMENT OF CLOTHING, TRANSFERRING, OR WASHING HANDS.   8    SUP MAY BE REQUIRED FOR SAFETY WITH NORMAL TOILET. BSC MAY BE USED AT NIGHT, ASSIST FOR EMPTYING AND CLEANING.   10   THE PATIENT IS ABLE TO GET ON/OFF THE TOILET, FASTEN CLOTHING AND USE TOIL
ADLS_COMMENTS: ES, CANES, OR A WALKARETTE, AND WALK 50 METRES WITHOUT HELP OR SUPERVISION.     AMBULATION/WHEELCHAIR * (IF UNABLE TO WALK) ONLY USE THIS ITEM IF THE PATIENT IS RATED “0” FOR AMBULATION, AND THEN ONLY IF THE PATIENT HAS BEEN TRAINED IN WHEELCHAIR MAN
ADLS_COMMENTS: OF DRESSING AND IS UNABLE TO PARTICIPATE IN THE ACTIVITY.   2     THE PATIENT IS ABLE TO PARTICIPATE TO SOME DEGREE, BUT IS DEPENDENT IN ALL ASPECTS OF DRESSING.   5     ASSISTANCE IS NEEDED IN PUTTING ON, AND/OR REMOVING ANY CLOTHING.   8     ONLY M
ADLS_COMMENTS: AFETY.   15  THE PATIENT CAN SAFELY APPROACH THE BED WALKING OR IN A WHEELCHAIR, LOCK BRAKES, LIFT FOOTRESTS, OR POSITION WALKING AID, MOVE SAFELY TO BED, LIE DOWN, COME TO A SITTING POSITION ON THE SIDE OF THE BED, CHANGE THE POSITION OF THE WHEELCH
ADLS_COMMENTS: ISION AND ASSISTANCE.   8    GENERALLY NO ASSISTANCE IS REQUIRED. AT TIMES SUP IS REQUIRED FOR SAFETY DUE TO MORNING STIFFNESS, SOB, ETC  10   THE PATIENT IS ABLE TO GO UP/DOWN A FLIGHT OF STAIRS SAFELY WITHOUT HELP OR SUPERVISION,USE HAND RAILS, CAN
ADLS_COMMENTS: INIMAL ASSISTANCE IS REQUIRED WITH FASTENING CLOTHING SUCH AS BUTTONS, ZIPS, BRA, SHOES, ETC.   10   THE PATIENT IS ABLE TO PUT ON, REMOVE, CORSET, BRACES, AS PRESCRIBED.     PERSONAL HYGIENE (GROOMING)   0    THE PATIENT IS UNABLE TO ATTEND TO PERSO
ADLS_COMMENTS: MILK/SUGAR INTO TEA, SALT, PEPPER, SPREADING BUTTER, TURNING A PLATE OR OTHER “SET UP” ACTIVITIES.   8    INDEP WITH PREPARED TRAY, MAY NEED MEAT CUT, MILK CARTON/JAR LID OPENED. ANOTHER PERSON IS NOT REQUIRED  10   THE PATIENT CAN FEED SELF FROM A
ADLS_COMMENTS: LE TO CONDUCT OWN PERSONAL HYGIENE BUT REQUIRES MIN ASSIST BEFORE AND/OR AFTER THE OPERATION.   5    THE PATIENT CAN WASH HIS/HER HANDS AND FACE, COMB HAIR, CLEAN TEETH AND SHAVE. A MALE PATIENT MAY USE ANY KIND OF RAZOR BUT MUST INSERT THE BLADE, OR
ADLS_COMMENTS: AND NIGHT, AND/OR IS INDEPENDENT WITH INTERNAL OR EXTERNAL DEVICES.     BATHING   0    TOTAL DEPENDENCE IN BATHING SELF.   1    ASSISTANCE IS REQUIRED IN ALL ASPECTS OF BATHING, BUT PATIENT IS ABLE TO MAKE SOME CONTRIBUTION.   3    ASSISTANCE IS REQ
FEEDING_WITHIN_DEFINED_LIMITS: 1
ADLS_COMMENTS: TRAY OR TABLE WHEN SOMEONE PUTS THE FOOD WITHIN REACH. THE PATIENT MUST PUT ON AN ASSISTIVE DEVICE IF NEEDED, CUT FOOD, AND IF DESIRED USE SALT AND PEPPER, SPREAD BUTTER, ETC.    SCORE  95/100    SCORE INTERPRETATION   TOTAL DEPENDENCE     00 - 20  S
ADLS_COMMENTS: NAL HYGIENE AND IS DEPENDENT IN ALL ASPECTS.   1    ASSISTANCE IS REQUIRED IN ALL STEPS OF PERSONAL HYGIENE, BUT PATIENT ABLE TO MAKE SOME CONTRIBUTION.   3    SOME ASSISTANCE IS REQUIRED IN ONE OR MORE STEPS OF PERSONAL HYGIENE.   4    PATIENT IS AB
ADLS_COMMENTS: N.   8    ASSISTANCE IS REQUIRED WITH REACHING AIDS AND/OR THEIR MANIPULATION. ONE PERSON IS REQUIRED TO OFFER ASSISTANCE.   12   THE PATIENT IS INDEPENDENT IN AMBULATION BUT UNABLE TO WALK 50 METRES WITHOUT HELP, OR SUPERVISION IS NEEDED FOR CONFIDE
ADLS_COMMENTS: AGEMENT.   0    DEPENDENT IN WHEELCHAIR AMBULATION.   1    PATIENT CAN PROPEL SELF SHORT DISTANCES ON FLAT SURFACE, BUT ASSISTANCE IS REQUIRED FOR ALL OTHER STEPS OF WHEELCHAIR MANAGEMENT.  3    PRESENCE OF ONE PERSON IS NECESSARY AND CONSTANT ASSIST
ADLS_COMMENTS: PLUG IN THE RAZOR WITHOUT HELP, AS WELL AS RETRIEVE IT FROM THE DRAWER OR CABINET. A FEMALE PATIENT MUST APPLY HER OWN MAKE-UP, IF USED, BUT NEED NOT BRAID OR STYLE HER HAIR.     FEEDING    0    DEPENDENT IN ALL ASPECTS AND NEEDS TO BE FED, NASOGAST

## 2025-02-18 ASSESSMENT — ENCOUNTER SYMPTOMS
DENIES PAIN: 1
PERSON REPORTING PAIN: PATIENT

## 2025-02-19 ENCOUNTER — HOME CARE VISIT (OUTPATIENT)
Dept: HOME HEALTH SERVICES | Facility: HOME HEALTH | Age: 75
End: 2025-02-19
Payer: MEDICARE

## 2025-02-19 PROCEDURE — G0157 HHC PT ASSISTANT EA 15: HCPCS | Mod: CQ,HHH

## 2025-02-19 ASSESSMENT — ENCOUNTER SYMPTOMS
PAIN LOCATION - PAIN SEVERITY: 3/10
PERSON REPORTING PAIN: PATIENT
PAIN LOCATION - PAIN QUALITY: ACHE
PAIN SEVERITY GOAL: 0/10
LOWEST PAIN SEVERITY IN PAST 24 HOURS: 2/10
PAIN LOCATION - RELIEVING FACTORS: ICE
HIGHEST PAIN SEVERITY IN PAST 24 HOURS: 5/10
PAIN: 1
SUBJECTIVE PAIN PROGRESSION: GRADUALLY IMPROVING
PAIN LOCATION: RIGHT HIP

## 2025-02-20 ASSESSMENT — ENCOUNTER SYMPTOMS
LOWEST PAIN SEVERITY IN PAST 24 HOURS: 2/10
PAIN LOCATION: RIGHT HIP
PAIN SEVERITY GOAL: 0/10
SUBJECTIVE PAIN PROGRESSION: GRADUALLY IMPROVING
PAIN LOCATION - PAIN SEVERITY: 2/10
PAIN LOCATION - PAIN QUALITY: ACHE
PERSON REPORTING PAIN: PATIENT
PAIN LOCATION - RELIEVING FACTORS: ICE
PAIN: 1
HIGHEST PAIN SEVERITY IN PAST 24 HOURS: 3/10

## 2025-02-24 ENCOUNTER — HOME CARE VISIT (OUTPATIENT)
Dept: HOME HEALTH SERVICES | Facility: HOME HEALTH | Age: 75
End: 2025-02-24
Payer: MEDICARE

## 2025-02-24 PROCEDURE — G0157 HHC PT ASSISTANT EA 15: HCPCS | Mod: CQ,HHH

## 2025-02-25 ENCOUNTER — APPOINTMENT (OUTPATIENT)
Dept: ORTHOPEDIC SURGERY | Facility: CLINIC | Age: 75
End: 2025-02-25
Payer: MEDICARE

## 2025-02-25 VITALS — HEIGHT: 65 IN | BODY MASS INDEX: 21.16 KG/M2 | WEIGHT: 127 LBS

## 2025-02-25 DIAGNOSIS — M16.11 PRIMARY OSTEOARTHRITIS OF RIGHT HIP: Primary | ICD-10-CM

## 2025-02-25 PROCEDURE — 99024 POSTOP FOLLOW-UP VISIT: CPT | Performed by: ORTHOPAEDIC SURGERY

## 2025-02-25 PROCEDURE — 1123F ACP DISCUSS/DSCN MKR DOCD: CPT | Performed by: ORTHOPAEDIC SURGERY

## 2025-02-25 PROCEDURE — 1159F MED LIST DOCD IN RCRD: CPT | Performed by: ORTHOPAEDIC SURGERY

## 2025-02-25 PROCEDURE — 4010F ACE/ARB THERAPY RXD/TAKEN: CPT | Performed by: ORTHOPAEDIC SURGERY

## 2025-02-25 PROCEDURE — 3008F BODY MASS INDEX DOCD: CPT | Performed by: ORTHOPAEDIC SURGERY

## 2025-02-25 ASSESSMENT — PAIN - FUNCTIONAL ASSESSMENT: PAIN_FUNCTIONAL_ASSESSMENT: NO/DENIES PAIN

## 2025-02-25 NOTE — PROGRESS NOTES
ORTHOPEDIC TOTAL JOINT  POST-OPERATIVE FOLLOW UP      ============================  IMPRESSION/PLAN:  ============================  74 y.o. female s/p Right Total Hip Replacement completed on 2/5/2025    PLAN:  -Weightbearing: Weight bearing as tolerated  -DVT Prophylaxis: Aspirin 81 mg twice daily for 1 more week  -Radiology Studies: None today  -Therapies: Continue PT/OT, continue home physical therapy  -Transition off assistive device as seen fit by patient and therapy  -Follow-up: 7 weeks with x-rays        Nina Oquendo presents today for follow up of joint replacement above. Pain controlled with current treatment plan. Patient has begun physical therapy. They are currently doing therapy at home. They are currently ambulating with Walker. Overall, they are doing well. They have no concerns with the hip. They would like some more PT at home.     Review of Systems:   Constitutional: See HPI for pain assessment, No significant weight loss, recent trauma. Denies fevers/chills  Cardiovascular: No chest pain, shortness of breath  Respiratory: No difficulty breathing, cough  Gastrointestinal: No nausea, vomiting, diarrhea, constipation  Musculoskeletal: Noted in HPI, no arthralgias   Integumentary: No rashes, easy bruising, redness   Neurological: no numbness or tingling in extremities, no gait disturbances     Patient Active Problem List   Diagnosis    Type 2 diabetes mellitus with diabetic neuropathy, with long-term current use of insulin    Hyperlipidemia    Chronic obstructive lung disease (Multi)    Benign essential hypertension    Essential hypertension    Depressive disorder    Protein-calorie malnutrition, unspecified severity (Multi)    Anxiety disorder, unspecified    Gastroesophageal reflux disease without esophagitis    Malignant neoplasm of urinary bladder (Multi)    Other specified disorders of adrenal gland    Adenoma of left adrenal gland    Primary osteoarthritis of right hip        =================================  EXAM  =================================  GENERAL: A/Ox3, NAD. Appears healthy, well nourished  CARDIAC: regular rate  LUNGS: Breathing non-labored    MUSCULOSKELETAL:  Laterality: right Hip exam  - Strength: Abduction 5/5, Flexion 5/5  - Palpation: non TTP along surgical site  - Incision: No overlying, erythema, induration, or drainage. Incision healing well with no wound dehiscence  - EHL/PF/DF motor intact  - compartments soft, negative homans  - Gait: using walker    NEUROVASCULAR:  - Neurovascular Status: sensation intact to light touch distally  - Capillary refill brisk at extremities, Bilateral dorsalis pedis pulse 2+     IMAGING: None today      Harshal Sanchez DO  Attending Surgeon  Joint Replacement and Adult Reconstructive Surgery  [place], [place]

## 2025-02-26 ENCOUNTER — HOME CARE VISIT (OUTPATIENT)
Dept: HOME HEALTH SERVICES | Facility: HOME HEALTH | Age: 75
End: 2025-02-26
Payer: MEDICARE

## 2025-02-26 PROCEDURE — G0151 HHCP-SERV OF PT,EA 15 MIN: HCPCS | Mod: HHH

## 2025-02-26 SDOH — ECONOMIC STABILITY: HOUSING INSECURITY: HOME SAFETY: NO CHANGE

## 2025-02-26 ASSESSMENT — ENCOUNTER SYMPTOMS
PAIN LOCATION: RIGHT HIP
PERSON REPORTING PAIN: PATIENT
SUBJECTIVE PAIN PROGRESSION: GRADUALLY IMPROVING
PAIN: 1

## 2025-02-28 ASSESSMENT — ENCOUNTER SYMPTOMS
PERSON REPORTING PAIN: PATIENT
DENIES PAIN: 1

## 2025-03-03 DIAGNOSIS — E78.2 MIXED HYPERLIPIDEMIA: ICD-10-CM

## 2025-03-03 RX ORDER — ATORVASTATIN CALCIUM 40 MG/1
40 TABLET, FILM COATED ORAL DAILY
Qty: 90 TABLET | Refills: 3 | Status: SHIPPED | OUTPATIENT
Start: 2025-03-03 | End: 2026-03-03

## 2025-03-04 ENCOUNTER — HOME CARE VISIT (OUTPATIENT)
Dept: HOME HEALTH SERVICES | Facility: HOME HEALTH | Age: 75
End: 2025-03-04
Payer: MEDICARE

## 2025-03-04 PROCEDURE — G0157 HHC PT ASSISTANT EA 15: HCPCS | Mod: CQ,HHH

## 2025-03-06 ENCOUNTER — HOME CARE VISIT (OUTPATIENT)
Dept: HOME HEALTH SERVICES | Facility: HOME HEALTH | Age: 75
End: 2025-03-06
Payer: MEDICARE

## 2025-03-06 PROCEDURE — G0157 HHC PT ASSISTANT EA 15: HCPCS | Mod: CQ,HHH

## 2025-03-08 ASSESSMENT — ENCOUNTER SYMPTOMS
PAIN LOCATION - PAIN FREQUENCY: INTERMITTENT
SUBJECTIVE PAIN PROGRESSION: GRADUALLY IMPROVING
SUBJECTIVE PAIN PROGRESSION: GRADUALLY IMPROVING
PAIN SEVERITY GOAL: 0/10
PAIN LOCATION - RELIEVING FACTORS: PAIN MEDS
PAIN LOCATION: RIGHT HIP
HIGHEST PAIN SEVERITY IN PAST 24 HOURS: 3/10
PAIN LOCATION - PAIN FREQUENCY: INTERMITTENT
PAIN LOCATION - RELIEVING FACTORS: PAIN MEDS
PAIN LOCATION - PAIN SEVERITY: 3/10
LOWEST PAIN SEVERITY IN PAST 24 HOURS: 2/10
PAIN: 1
PAIN LOCATION - PAIN QUALITY: ACHE
PERSON REPORTING PAIN: PATIENT
PERSON REPORTING PAIN: PATIENT
PAIN LOCATION - PAIN QUALITY: ACHE
PAIN LOCATION: RIGHT HIP
PAIN LOCATION - PAIN SEVERITY: 2/10
HIGHEST PAIN SEVERITY IN PAST 24 HOURS: 4/10
LOWEST PAIN SEVERITY IN PAST 24 HOURS: 2/10
PAIN SEVERITY GOAL: 0/10
PAIN: 1

## 2025-03-11 ENCOUNTER — HOME CARE VISIT (OUTPATIENT)
Dept: HOME HEALTH SERVICES | Facility: HOME HEALTH | Age: 75
End: 2025-03-11
Payer: MEDICARE

## 2025-03-11 DIAGNOSIS — M16.11 PRIMARY OSTEOARTHRITIS OF RIGHT HIP: ICD-10-CM

## 2025-03-11 PROCEDURE — G0157 HHC PT ASSISTANT EA 15: HCPCS | Mod: CQ,HHH

## 2025-03-12 ENCOUNTER — HOME CARE VISIT (OUTPATIENT)
Dept: HOME HEALTH SERVICES | Facility: HOME HEALTH | Age: 75
End: 2025-03-12
Payer: MEDICARE

## 2025-03-12 VITALS — RESPIRATION RATE: 16 BRPM

## 2025-03-12 PROCEDURE — G0151 HHCP-SERV OF PT,EA 15 MIN: HCPCS | Mod: HHH

## 2025-03-12 ASSESSMENT — ACTIVITIES OF DAILY LIVING (ADL)
AMBULATION ASSISTANCE: INDEPENDENT
CURRENT_FUNCTION: INDEPENDENT
HOME_HEALTH_OASIS: 00
OASIS_M1830: 00
AMBULATION ASSISTANCE: 1
PHYSICAL TRANSFERS ASSESSED: 1

## 2025-03-12 ASSESSMENT — ENCOUNTER SYMPTOMS
SUBJECTIVE PAIN PROGRESSION: RAPIDLY IMPROVING
PAIN LOCATION: RIGHT HIP
PAIN LOCATION - PAIN SEVERITY: 1/10

## 2025-03-13 ENCOUNTER — TELEPHONE (OUTPATIENT)
Dept: PRIMARY CARE | Facility: CLINIC | Age: 75
End: 2025-03-13
Payer: MEDICARE

## 2025-03-13 DIAGNOSIS — R35.0 URINARY FREQUENCY: Primary | ICD-10-CM

## 2025-03-13 NOTE — TELEPHONE ENCOUNTER
"Pt called stating she is going for lab work next week and wanted [facility] to add a UA, she has been experiencing \"costant urination x 6 mos\"    Please advise Thx  "

## 2025-03-18 ENCOUNTER — TELEPHONE (OUTPATIENT)
Dept: ORTHOPEDIC SURGERY | Facility: CLINIC | Age: 75
End: 2025-03-18
Payer: MEDICARE

## 2025-03-18 NOTE — TELEPHONE ENCOUNTER
Patient would like to start doing outpatient PT. She had R MARCELINO done on 2/5/25. Can we put that order in for her?

## 2025-03-22 LAB
ALBUMIN SERPL-MCNC: 4.5 G/DL (ref 3.6–5.1)
ALP SERPL-CCNC: 95 U/L (ref 37–153)
ALT SERPL-CCNC: 11 U/L (ref 6–29)
ANION GAP SERPL CALCULATED.4IONS-SCNC: 11 MMOL/L (CALC) (ref 7–17)
APPEARANCE UR: ABNORMAL
AST SERPL-CCNC: 17 U/L (ref 10–35)
BACTERIA #/AREA URNS HPF: ABNORMAL /HPF
BILIRUB SERPL-MCNC: 0.5 MG/DL (ref 0.2–1.2)
BILIRUB UR QL STRIP: NEGATIVE
BUN SERPL-MCNC: 21 MG/DL (ref 7–25)
CALCIUM SERPL-MCNC: 9.6 MG/DL (ref 8.6–10.4)
CHLORIDE SERPL-SCNC: 104 MMOL/L (ref 98–110)
CHOLEST SERPL-MCNC: 157 MG/DL
CHOLEST/HDLC SERPL: 2.3 (CALC)
CO2 SERPL-SCNC: 26 MMOL/L (ref 20–32)
COLOR UR: ABNORMAL
CREAT SERPL-MCNC: 0.69 MG/DL (ref 0.6–1)
EGFRCR SERPLBLD CKD-EPI 2021: 92 ML/MIN/1.73M2
EST. AVERAGE GLUCOSE BLD GHB EST-MCNC: 140 MG/DL
EST. AVERAGE GLUCOSE BLD GHB EST-SCNC: 7.7 MMOL/L
GLUCOSE SERPL-MCNC: 159 MG/DL (ref 65–99)
GLUCOSE UR QL STRIP: NEGATIVE
HBA1C MFR BLD: 6.5 % OF TOTAL HGB
HDLC SERPL-MCNC: 67 MG/DL
HGB UR QL STRIP: ABNORMAL
HYALINE CASTS #/AREA URNS LPF: ABNORMAL /LPF
KETONES UR QL STRIP: ABNORMAL
LDLC SERPL CALC-MCNC: 71 MG/DL (CALC)
LEUKOCYTE ESTERASE UR QL STRIP: ABNORMAL
NITRITE UR QL STRIP: NEGATIVE
NONHDLC SERPL-MCNC: 90 MG/DL (CALC)
PH UR STRIP: ABNORMAL [PH] (ref 5–8)
POTASSIUM SERPL-SCNC: 4.8 MMOL/L (ref 3.5–5.3)
PROT SERPL-MCNC: 7 G/DL (ref 6.1–8.1)
PROT UR QL STRIP: ABNORMAL
RBC #/AREA URNS HPF: ABNORMAL /HPF
SERVICE CMNT-IMP: ABNORMAL
SODIUM SERPL-SCNC: 141 MMOL/L (ref 135–146)
SP GR UR STRIP: 1.03 (ref 1–1.03)
SQUAMOUS #/AREA URNS HPF: ABNORMAL /HPF
TRIGL SERPL-MCNC: 100 MG/DL
WBC #/AREA URNS HPF: ABNORMAL /HPF

## 2025-03-24 DIAGNOSIS — N30.01 ACUTE CYSTITIS WITH HEMATURIA: Primary | ICD-10-CM

## 2025-03-24 RX ORDER — CIPROFLOXACIN 500 MG/1
500 TABLET ORAL 2 TIMES DAILY
Qty: 10 TABLET | Refills: 0 | Status: SHIPPED | OUTPATIENT
Start: 2025-03-24 | End: 2025-03-26 | Stop reason: ALTCHOICE

## 2025-03-25 ENCOUNTER — APPOINTMENT (OUTPATIENT)
Dept: ORTHOPEDIC SURGERY | Facility: CLINIC | Age: 75
End: 2025-03-25
Payer: MEDICARE

## 2025-03-25 ENCOUNTER — TELEPHONE (OUTPATIENT)
Dept: PRIMARY CARE | Facility: CLINIC | Age: 75
End: 2025-03-25

## 2025-03-25 ENCOUNTER — HOSPITAL ENCOUNTER (OUTPATIENT)
Dept: RADIOLOGY | Facility: CLINIC | Age: 75
Discharge: HOME | End: 2025-03-25
Payer: MEDICARE

## 2025-03-25 VITALS — WEIGHT: 127 LBS | BODY MASS INDEX: 21.16 KG/M2 | HEIGHT: 65 IN

## 2025-03-25 DIAGNOSIS — M16.11 PRIMARY OSTEOARTHRITIS OF RIGHT HIP: ICD-10-CM

## 2025-03-25 PROCEDURE — 3008F BODY MASS INDEX DOCD: CPT | Performed by: ORTHOPAEDIC SURGERY

## 2025-03-25 PROCEDURE — 99024 POSTOP FOLLOW-UP VISIT: CPT | Performed by: ORTHOPAEDIC SURGERY

## 2025-03-25 PROCEDURE — 1159F MED LIST DOCD IN RCRD: CPT | Performed by: ORTHOPAEDIC SURGERY

## 2025-03-25 PROCEDURE — 73502 X-RAY EXAM HIP UNI 2-3 VIEWS: CPT | Mod: RT

## 2025-03-25 PROCEDURE — 1123F ACP DISCUSS/DSCN MKR DOCD: CPT | Performed by: ORTHOPAEDIC SURGERY

## 2025-03-25 PROCEDURE — 4010F ACE/ARB THERAPY RXD/TAKEN: CPT | Performed by: ORTHOPAEDIC SURGERY

## 2025-03-25 ASSESSMENT — PAIN SCALES - GENERAL: PAINLEVEL_OUTOF10: 0 - NO PAIN

## 2025-03-25 ASSESSMENT — PAIN - FUNCTIONAL ASSESSMENT: PAIN_FUNCTIONAL_ASSESSMENT: 0-10

## 2025-03-25 NOTE — PROGRESS NOTES
ORTHOPEDIC TOTAL JOINT  POST-OPERATIVE FOLLOW UP      ============================  IMPRESSION/PLAN:  ============================  74 y.o. female s/p Right Total Hip Replacement completed on 2/5/2025    PLAN:  -Weightbearing: Weight bearing as tolerated  -DVT Prophylaxis: No longer needed  -Radiology Studies: Reviewed today  -Therapies: Recommended continue work with physical therapy for her soft tissue sprain of her hip but overall the hip itself in terms of her replacement is doing fine.  Will continue observe her symptoms otherwise  -Transition off assistive device as seen fit by patient and therapy  -Follow-up:  1 year matthew of surgery with x-rays or sooner if issues arise        Nina Oquendo presents today for follow up of joint replacement above. Pain controlled with current treatment plan. Patient has completed physical therapy and are doing exercises on their own. They are currently ambulating with Walker. They are having right sided groin pain since 2/7/2025. They were getting out of bad and fell. Their pain is worse with turning/pivoting motions. No new injuries. XR done today.    Review of Systems:   Constitutional: See HPI for pain assessment, No significant weight loss, recent trauma. Denies fevers/chills  Cardiovascular: No chest pain, shortness of breath  Respiratory: No difficulty breathing, cough  Gastrointestinal: No nausea, vomiting, diarrhea, constipation  Musculoskeletal: Noted in HPI, no arthralgias   Integumentary: No rashes, easy bruising, redness   Neurological: no numbness or tingling in extremities, no gait disturbances     Patient Active Problem List   Diagnosis    Type 2 diabetes mellitus with diabetic neuropathy, with long-term current use of insulin    Hyperlipidemia    Chronic obstructive lung disease (Multi)    Benign essential hypertension    Essential hypertension    Depressive disorder    Protein-calorie malnutrition, unspecified severity (Multi)    Anxiety disorder,  unspecified    Gastroesophageal reflux disease without esophagitis    Malignant neoplasm of urinary bladder (Multi)    Other specified disorders of adrenal gland    Adenoma of left adrenal gland    Primary osteoarthritis of right hip       =================================  EXAM  =================================  GENERAL: A/Ox3, NAD. Appears healthy, well nourished  CARDIAC: regular rate  LUNGS: Breathing non-labored    MUSCULOSKELETAL:  Laterality: right Hip exam  - Strength: Abduction 5/5, Flexion 5/5  - Palpation: non TTP along surgical site  - Incision: No overlying, erythema, induration, or drainage. Incision healing well with no wound dehiscence  - EHL/PF/DF motor intact  - compartments soft, negative homans  - Gait: using walker    NEUROVASCULAR:  - Neurovascular Status: sensation intact to light touch distally  - Capillary refill brisk at extremities, Bilateral dorsalis pedis pulse 2+     IMAGING: Multi views right hip and pelvis reviewed today which demonstrate no signs of loosening or failure of right total hip arthroplasty. X-rays were personally reviewed by me.  Radiology reports were reviewed by me as well, if available at the time.        Harshal Sanchez DO  Attending Surgeon  Joint Replacement and Adult Reconstructive Surgery  [place], [place]

## 2025-03-25 NOTE — TELEPHONE ENCOUNTER
----- Message from Christopher Sam sent at 3/24/2025 10:03 PM EDT -----  Please add on Ucx if it has not been done already on this urine sample.  thx

## 2025-03-25 NOTE — PROGRESS NOTES
Subjective   Patient ID: Nina Oquendo is a 74 y.o. female who presents for No chief complaint on file..  HPI    Patient Active Problem List   Diagnosis    Type 2 diabetes mellitus with diabetic neuropathy, with long-term current use of insulin    Hyperlipidemia    Chronic obstructive lung disease (Multi)    Benign essential hypertension    Essential hypertension    Depressive disorder    Protein-calorie malnutrition, unspecified severity (Multi)    Anxiety disorder, unspecified    Gastroesophageal reflux disease without esophagitis    Malignant neoplasm of urinary bladder (Multi)    Other specified disorders of adrenal gland    Adenoma of left adrenal gland    Primary osteoarthritis of right hip       Social Connections: Feeling Socially Integrated (3/12/2025)    OASIS : Social Isolation     Frequency of experiencing loneliness or isolation: Never   Recent Concern: Social Connections - Feeling Somewhat Isolated (2/7/2025)    OASIS : Social Isolation     Frequency of experiencing loneliness or isolation: Sometimes       Current Outpatient Medications on File Prior to Visit   Medication Sig Dispense Refill    acetaminophen (Tylenol) 325 mg tablet Take 3 tablets (975 mg) by mouth every 8 hours if needed for mild pain (1 - 3). 90 tablet 1    atorvastatin (Lipitor) 40 mg tablet Take 1 tablet (40 mg) by mouth once daily. 90 tablet 3    blood-glucose sensor (FreeStyle Mukund 3 Sensor) device Apply under skin [id number] 6 each 3    DULoxetine (Cymbalta) 30 mg DR capsule Take 1 capsule (30 mg) by mouth once daily. Do not crush or chew. 90 capsule 1    insulin lispro (HumaLOG) 100 unit/mL injection Inject 5 Units under the skin 3 times daily (morning, midday, late afternoon). Take as directed per insulin instructions 13.5 mL 3    losartan (Cozaar) 50 mg tablet Take 1 tablet (50 mg) by mouth once daily. 90 tablet 3    metoprolol succinate XL (Toprol-XL) 50 mg 24 hr tablet Take 1 tablet (50 mg) by mouth once daily. 90  "tablet 3    omeprazole (PriLOSEC) 20 mg DR capsule Take 1 capsule (20 mg) by mouth early in the morning..      ondansetron (Zofran) 4 mg tablet Take 1 tablet (4 mg) by mouth every 8 hours if needed for nausea. 90 tablet 1    pantoprazole (ProtoNix) 40 mg EC tablet Take 1 tablet (40 mg) by mouth once daily as needed (heartburn). Do not crush, chew, or split. (Patient not taking: Reported on 2/7/2025) 30 tablet 0    pen needle, diabetic 31 gauge x 5/16\" needle 1 each 3 times a day. 300 each 3     No current facility-administered medications on file prior to visit.        There were no vitals filed for this visit.  There were no vitals filed for this visit.    Review of Systems    Objective     Physical Exam    Orders Only on 03/21/2025   Component Date Value Ref Range Status    CHOLESTEROL, TOTAL 03/21/2025 157  <200 mg/dL Final    HDL CHOLESTEROL 03/21/2025 67  > OR = 50 mg/dL Final    TRIGLYCERIDES 03/21/2025 100  <150 mg/dL Final    LDL-CHOLESTEROL 03/21/2025 71  mg/dL (calc) Final    Comment: Reference range: <100     Desirable range <100 mg/dL for primary prevention;    <70 mg/dL for patients with CHD or diabetic patients   with > or = 2 CHD risk factors.     LDL-C is now calculated using the Cooper-Karl   calculation, which is a validated novel method providing   better accuracy than the Friedewald equation in the   estimation of LDL-C.   Cooper SS et al. MORIAH. 2013;310(19): 3775-0162   (http://education.[employer]/faq/PNW515)      CHOL/HDLC RATIO 03/21/2025 2.3  <5.0 (calc) Final    NON HDL CHOLESTEROL 03/21/2025 90  <130 mg/dL (calc) Final    Comment: For patients with diabetes plus 1 major ASCVD risk   factor, treating to a non-HDL-C goal of <100 mg/dL   (LDL-C of <70 mg/dL) is considered a therapeutic   option.      GLUCOSE 03/21/2025 159 (H)  65 - 99 mg/dL Final    Comment:               Fasting reference interval     For someone without known diabetes, a glucose  value >125 mg/dL indicates " that they may have  diabetes and this should be confirmed with a  follow-up test.         UREA NITROGEN (BUN) 03/21/2025 21  7 - 25 mg/dL Final    CREATININE 03/21/2025 0.69  0.60 - 1.00 mg/dL Final    EGFR 03/21/2025 92  > OR = 60 mL/min/1.73m2 Final    SODIUM 03/21/2025 141  135 - 146 mmol/L Final    POTASSIUM 03/21/2025 4.8  3.5 - 5.3 mmol/L Final    CHLORIDE 03/21/2025 104  98 - 110 mmol/L Final    CARBON DIOXIDE 03/21/2025 26  20 - 32 mmol/L Final    ELECTROLYTE BALANCE 03/21/2025 11  7 - 17 mmol/L (calc) Final    CALCIUM 03/21/2025 9.6  8.6 - 10.4 mg/dL Final    PROTEIN, TOTAL 03/21/2025 7.0  6.1 - 8.1 g/dL Final    ALBUMIN 03/21/2025 4.5  3.6 - 5.1 g/dL Final    BILIRUBIN, TOTAL 03/21/2025 0.5  0.2 - 1.2 mg/dL Final    ALKALINE PHOSPHATASE 03/21/2025 95  37 - 153 U/L Final    AST 03/21/2025 17  10 - 35 U/L Final    ALT 03/21/2025 11  6 - 29 U/L Final    HEMOGLOBIN A1c 03/21/2025 6.5 (H)  <5.7 % of total Hgb Final    Comment: For someone without known diabetes, a hemoglobin A1c  value of 6.5% or greater indicates that they may have   diabetes and this should be confirmed with a follow-up   test.     For someone with known diabetes, a value <7% indicates   that their diabetes is well controlled and a value   greater than or equal to 7% indicates suboptimal   control. A1c targets should be individualized based on   duration of diabetes, age, comorbid conditions, and   other considerations.     Currently, no consensus exists regarding use of  hemoglobin A1c for diagnosis of diabetes for children.          eAG (mg/dL) 03/21/2025 140  mg/dL Final    eAG (mmol/L) 03/21/2025 7.7  mmol/L Final   Telephone on 03/13/2025   Component Date Value Ref Range Status    COLOR 03/21/2025 DARK YELLOW  YELLOW Final    APPEARANCE 03/21/2025 TURBID (A)  CLEAR Final    SPECIFIC GRAVITY 03/21/2025 1.027  1.001 - 1.035 Final    PH 03/21/2025 < OR = 5.0  5.0 - 8.0 Final    GLUCOSE 03/21/2025 NEGATIVE  NEGATIVE Final    BILIRUBIN  03/21/2025 NEGATIVE  NEGATIVE Final    KETONES 03/21/2025 TRACE (A)  NEGATIVE Final    OCCULT BLOOD 03/21/2025 1+ (A)  NEGATIVE Final    PROTEIN 03/21/2025 1+ (A)  NEGATIVE Final    NITRITE 03/21/2025 NEGATIVE  NEGATIVE Final    LEUKOCYTE ESTERASE 03/21/2025 3+ (A)  NEGATIVE Final    WBC 03/21/2025 > OR = 60 (A)  < OR = 5 /HPF Final    RBC 03/21/2025 3-10 (A)  < OR = 2 /HPF Final    SQUAMOUS EPITHELIAL CELLS 03/21/2025 6-10 (A)  < OR = 5 /HPF Final    BACTERIA 03/21/2025 MANY (A)  NONE SEEN /HPF Final    HYALINE CAST 03/21/2025 6-10 (A)  NONE SEEN /LPF Final    NOTE 03/21/2025    Final    Comment: This urine was analyzed for the presence of WBC,   RBC, bacteria, casts, and other formed elements.   Only those elements seen were reported.           Admission on 02/05/2025, Discharged on 02/06/2025   Component Date Value Ref Range Status    Ventricular Rate 02/05/2025 74  BPM Final    Atrial Rate 02/05/2025 72  BPM Final    DE Interval 02/05/2025 137  ms Final    QRS Duration 02/05/2025 121  ms Final    QT Interval 02/05/2025 406  ms Final    QTC Calculation(Bazett) 02/05/2025 451  ms Final    P Axis 02/05/2025 74  degrees Final    R Axis 02/05/2025 73  degrees Final    T Axis 02/05/2025 36  degrees Final    QRS Count 02/05/2025 12  beats Final    Q Onset 02/05/2025 249  ms Final    T Offset 02/05/2025 452  ms Final    QTC Fredericia 02/05/2025 435  ms Final    ABO TYPE 02/05/2025 O   Final    Rh TYPE 02/05/2025 POS   Final    ANTIBODY SCREEN 02/05/2025 NEG   Final    POCT Glucose 02/05/2025 161 (H)  74 - 99 mg/dL Final    POCT Glucose 02/05/2025 166 (H)  74 - 99 mg/dL Final    POCT Glucose 02/05/2025 157 (H)  74 - 99 mg/dL Final    POCT Glucose 02/05/2025 169 (H)  74 - 99 mg/dL Final    POCT Glucose 02/06/2025 137 (H)  74 - 99 mg/dL Final    POCT Glucose 02/06/2025 163 (H)  74 - 99 mg/dL Final       Assessment/Plan

## 2025-03-26 ENCOUNTER — APPOINTMENT (OUTPATIENT)
Dept: PRIMARY CARE | Facility: CLINIC | Age: 75
End: 2025-03-26
Payer: MEDICARE

## 2025-03-26 VITALS
HEART RATE: 67 BPM | SYSTOLIC BLOOD PRESSURE: 90 MMHG | DIASTOLIC BLOOD PRESSURE: 60 MMHG | OXYGEN SATURATION: 98 % | TEMPERATURE: 97.5 F | BODY MASS INDEX: 20.79 KG/M2 | WEIGHT: 123 LBS

## 2025-03-26 DIAGNOSIS — I10 BENIGN ESSENTIAL HYPERTENSION: ICD-10-CM

## 2025-03-26 DIAGNOSIS — E11.9 TYPE 2 DIABETES MELLITUS WITHOUT COMPLICATION, WITH LONG-TERM CURRENT USE OF INSULIN: Primary | ICD-10-CM

## 2025-03-26 DIAGNOSIS — N30.01 ACUTE CYSTITIS WITH HEMATURIA: ICD-10-CM

## 2025-03-26 DIAGNOSIS — E78.2 MIXED HYPERLIPIDEMIA: ICD-10-CM

## 2025-03-26 DIAGNOSIS — F32.A DEPRESSIVE DISORDER: ICD-10-CM

## 2025-03-26 DIAGNOSIS — C68.9 UROTHELIAL CANCER (MULTI): ICD-10-CM

## 2025-03-26 DIAGNOSIS — Z87.891 PERSONAL HISTORY OF NICOTINE DEPENDENCE: ICD-10-CM

## 2025-03-26 DIAGNOSIS — Z72.0 TOBACCO USE: ICD-10-CM

## 2025-03-26 DIAGNOSIS — Z79.4 TYPE 2 DIABETES MELLITUS WITHOUT COMPLICATION, WITH LONG-TERM CURRENT USE OF INSULIN: Primary | ICD-10-CM

## 2025-03-26 PROCEDURE — 3078F DIAST BP <80 MM HG: CPT | Performed by: FAMILY MEDICINE

## 2025-03-26 PROCEDURE — G2211 COMPLEX E/M VISIT ADD ON: HCPCS | Performed by: FAMILY MEDICINE

## 2025-03-26 PROCEDURE — 1159F MED LIST DOCD IN RCRD: CPT | Performed by: FAMILY MEDICINE

## 2025-03-26 PROCEDURE — 3074F SYST BP LT 130 MM HG: CPT | Performed by: FAMILY MEDICINE

## 2025-03-26 PROCEDURE — 99214 OFFICE O/P EST MOD 30 MIN: CPT | Performed by: FAMILY MEDICINE

## 2025-03-26 PROCEDURE — 1123F ACP DISCUSS/DSCN MKR DOCD: CPT | Performed by: FAMILY MEDICINE

## 2025-03-26 RX ORDER — METOPROLOL SUCCINATE 50 MG/1
50 TABLET, EXTENDED RELEASE ORAL DAILY
Qty: 90 TABLET | Refills: 3 | Status: SHIPPED | OUTPATIENT
Start: 2025-03-26 | End: 2026-03-26

## 2025-03-26 RX ORDER — DULOXETIN HYDROCHLORIDE 30 MG/1
30 CAPSULE, DELAYED RELEASE ORAL DAILY
Qty: 90 CAPSULE | Refills: 1 | Status: SHIPPED | OUTPATIENT
Start: 2025-03-26 | End: 2025-09-22

## 2025-03-26 RX ORDER — BUPROPION HYDROCHLORIDE 150 MG/1
150 TABLET ORAL EVERY MORNING
Qty: 90 TABLET | Refills: 1 | Status: SHIPPED | OUTPATIENT
Start: 2025-03-26 | End: 2025-09-22

## 2025-03-26 RX ORDER — ATORVASTATIN CALCIUM 40 MG/1
40 TABLET, FILM COATED ORAL DAILY
Qty: 90 TABLET | Refills: 3 | Status: SHIPPED | OUTPATIENT
Start: 2025-03-26 | End: 2026-03-26

## 2025-03-26 ASSESSMENT — ENCOUNTER SYMPTOMS: HYPERTENSION: 1

## 2025-03-26 NOTE — PROGRESS NOTES
Subjective   Patient ID: Nina Oquendo is a 74 y.o. female who presents for Hypertension (Recheck), Hyperlipidemia (Review BW), and GERD.  Hypertension    Hyperlipidemia    GERD    1. DM: a1c was 6.4..  Taking her insulin    2. Lipids: denies chest pain. taking medication.     3. Tobacco use: still smoking    4. HTN:: running low- had a syncopal episode around [place].    5. Adrenal carcinoma: now cancer free.      6. UTI: taking abx for UTI.    Patient Active Problem List   Diagnosis    Type 2 diabetes mellitus with diabetic neuropathy, with long-term current use of insulin    Hyperlipidemia    Chronic obstructive lung disease (Multi)    Benign essential hypertension    Essential hypertension    Depressive disorder    Protein-calorie malnutrition, unspecified severity (Multi)    Anxiety disorder, unspecified    Gastroesophageal reflux disease without esophagitis    Malignant neoplasm of urinary bladder (Multi)    Other specified disorders of adrenal gland    Adenoma of left adrenal gland    Primary osteoarthritis of right hip       Social Connections: Feeling Socially Integrated (3/12/2025)    OASIS : Social Isolation     Frequency of experiencing loneliness or isolation: Never   Recent Concern: Social Connections - Feeling Somewhat Isolated (2/7/2025)    OASIS : Social Isolation     Frequency of experiencing loneliness or isolation: Sometimes       Current Outpatient Medications on File Prior to Visit   Medication Sig Dispense Refill    acetaminophen (Tylenol) 325 mg tablet Take 3 tablets (975 mg) by mouth every 8 hours if needed for mild pain (1 - 3). 90 tablet 1    atorvastatin (Lipitor) 40 mg tablet Take 1 tablet (40 mg) by mouth once daily. 90 tablet 3    blood-glucose sensor (FreeStyle Mukund 3 Sensor) device Apply under skin [id number] 6 each 3    DULoxetine (Cymbalta) 30 mg DR capsule Take 1 capsule (30 mg) by mouth once daily. Do not crush or chew. 90 capsule 1    insulin lispro (HumaLOG) 100  "unit/mL injection Inject 5 Units under the skin 3 times daily (morning, midday, late afternoon). Take as directed per insulin instructions 13.5 mL 3    metoprolol succinate XL (Toprol-XL) 50 mg 24 hr tablet Take 1 tablet (50 mg) by mouth once daily. 90 tablet 3    omeprazole (PriLOSEC) 20 mg DR capsule Take 1 capsule (20 mg) by mouth early in the morning..      ondansetron (Zofran) 4 mg tablet Take 1 tablet (4 mg) by mouth every 8 hours if needed for nausea. 90 tablet 1    pantoprazole (ProtoNix) 40 mg EC tablet Take 1 tablet (40 mg) by mouth once daily as needed (heartburn). Do not crush, chew, or split. 30 tablet 0    pen needle, diabetic 31 gauge x 5/16\" needle 1 each 3 times a day. 300 each 3    [DISCONTINUED] ciprofloxacin (Cipro) 500 mg tablet Take 1 tablet (500 mg) by mouth 2 times a day for 5 days. 10 tablet 0    [DISCONTINUED] losartan (Cozaar) 50 mg tablet Take 1 tablet (50 mg) by mouth once daily. 90 tablet 3     No current facility-administered medications on file prior to visit.        Vitals:    03/26/25 0952   BP: 90/60   Pulse: 67   Temp: 36.4 °C (97.5 °F)   SpO2: 98%     Vitals:    03/26/25 0952   Weight: 55.8 kg (123 lb)       Review of Systems   All other systems reviewed and are negative.      Objective     Physical Exam  Constitutional:       Appearance: Normal appearance. She is well-developed.   HENT:      Head: Atraumatic.   Cardiovascular:      Rate and Rhythm: Normal rate and regular rhythm.      Heart sounds: Normal heart sounds. No murmur heard.  Pulmonary:      Effort: Pulmonary effort is normal.      Breath sounds: Normal breath sounds.   Abdominal:      General: Bowel sounds are normal.      Palpations: Abdomen is soft.   Skin:     General: Skin is warm.   Neurological:      General: No focal deficit present.      Mental Status: She is alert.   Psychiatric:         Mood and Affect: Mood normal.         Orders Only on 03/21/2025   Component Date Value Ref Range Status    CHOLESTEROL, " TOTAL 03/21/2025 157  <200 mg/dL Final    HDL CHOLESTEROL 03/21/2025 67  > OR = 50 mg/dL Final    TRIGLYCERIDES 03/21/2025 100  <150 mg/dL Final    LDL-CHOLESTEROL 03/21/2025 71  mg/dL (calc) Final    Comment: Reference range: <100     Desirable range <100 mg/dL for primary prevention;    <70 mg/dL for patients with CHD or diabetic patients   with > or = 2 CHD risk factors.     LDL-C is now calculated using the Juan DavidBarajas   calculation, which is a validated novel method providing   better accuracy than the Friedewald equation in the   estimation of LDL-C.   Cooper PERDOMO et al. MORIAH. 2013;310(19): 9546-1764   (http://[employer]/faq/NVL269)      CHOL/HDLC RATIO 03/21/2025 2.3  <5.0 (calc) Final    NON HDL CHOLESTEROL 03/21/2025 90  <130 mg/dL (calc) Final    Comment: For patients with diabetes plus 1 major ASCVD risk   factor, treating to a non-HDL-C goal of <100 mg/dL   (LDL-C of <70 mg/dL) is considered a therapeutic   option.      GLUCOSE 03/21/2025 159 (H)  65 - 99 mg/dL Final    Comment:               Fasting reference interval     For someone without known diabetes, a glucose  value >125 mg/dL indicates that they may have  diabetes and this should be confirmed with a  follow-up test.         UREA NITROGEN (BUN) 03/21/2025 21  7 - 25 mg/dL Final    CREATININE 03/21/2025 0.69  0.60 - 1.00 mg/dL Final    EGFR 03/21/2025 92  > OR = 60 mL/min/1.73m2 Final    SODIUM 03/21/2025 141  135 - 146 mmol/L Final    POTASSIUM 03/21/2025 4.8  3.5 - 5.3 mmol/L Final    CHLORIDE 03/21/2025 104  98 - 110 mmol/L Final    CARBON DIOXIDE 03/21/2025 26  20 - 32 mmol/L Final    ELECTROLYTE BALANCE 03/21/2025 11  7 - 17 mmol/L (calc) Final    CALCIUM 03/21/2025 9.6  8.6 - 10.4 mg/dL Final    PROTEIN, TOTAL 03/21/2025 7.0  6.1 - 8.1 g/dL Final    ALBUMIN 03/21/2025 4.5  3.6 - 5.1 g/dL Final    BILIRUBIN, TOTAL 03/21/2025 0.5  0.2 - 1.2 mg/dL Final    ALKALINE PHOSPHATASE 03/21/2025 95  37 - 153 U/L Final    AST  03/21/2025 17  10 - 35 U/L Final    ALT 03/21/2025 11  6 - 29 U/L Final    HEMOGLOBIN A1c 03/21/2025 6.5 (H)  <5.7 % of total Hgb Final    Comment: For someone without known diabetes, a hemoglobin A1c  value of 6.5% or greater indicates that they may have   diabetes and this should be confirmed with a follow-up   test.     For someone with known diabetes, a value <7% indicates   that their diabetes is well controlled and a value   greater than or equal to 7% indicates suboptimal   control. A1c targets should be individualized based on   duration of diabetes, age, comorbid conditions, and   other considerations.     Currently, no consensus exists regarding use of  hemoglobin A1c for diagnosis of diabetes for children.          eAG (mg/dL) 03/21/2025 140  mg/dL Final    eAG (mmol/L) 03/21/2025 7.7  mmol/L Final   Telephone on 03/13/2025   Component Date Value Ref Range Status    COLOR 03/21/2025 DARK YELLOW  YELLOW Final    APPEARANCE 03/21/2025 TURBID (A)  CLEAR Final    SPECIFIC GRAVITY 03/21/2025 1.027  1.001 - 1.035 Final    PH 03/21/2025 < OR = 5.0  5.0 - 8.0 Final    GLUCOSE 03/21/2025 NEGATIVE  NEGATIVE Final    BILIRUBIN 03/21/2025 NEGATIVE  NEGATIVE Final    KETONES 03/21/2025 TRACE (A)  NEGATIVE Final    OCCULT BLOOD 03/21/2025 1+ (A)  NEGATIVE Final    PROTEIN 03/21/2025 1+ (A)  NEGATIVE Final    NITRITE 03/21/2025 NEGATIVE  NEGATIVE Final    LEUKOCYTE ESTERASE 03/21/2025 3+ (A)  NEGATIVE Final    WBC 03/21/2025 > OR = 60 (A)  < OR = 5 /HPF Final    RBC 03/21/2025 3-10 (A)  < OR = 2 /HPF Final    SQUAMOUS EPITHELIAL CELLS 03/21/2025 6-10 (A)  < OR = 5 /HPF Final    BACTERIA 03/21/2025 MANY (A)  NONE SEEN /HPF Final    HYALINE CAST 03/21/2025 6-10 (A)  NONE SEEN /LPF Final    NOTE 03/21/2025    Final    Comment: This urine was analyzed for the presence of WBC,   RBC, bacteria, casts, and other formed elements.   Only those elements seen were reported.           Admission on 02/05/2025, Discharged on  02/06/2025   Component Date Value Ref Range Status    Ventricular Rate 02/05/2025 74  BPM Final    Atrial Rate 02/05/2025 72  BPM Final    NE Interval 02/05/2025 137  ms Final    QRS Duration 02/05/2025 121  ms Final    QT Interval 02/05/2025 406  ms Final    QTC Calculation(Bazett) 02/05/2025 451  ms Final    P Axis 02/05/2025 74  degrees Final    R Axis 02/05/2025 73  degrees Final    T Axis 02/05/2025 36  degrees Final    QRS Count 02/05/2025 12  beats Final    Q Onset 02/05/2025 249  ms Final    T Offset 02/05/2025 452  ms Final    QTC Fredericia 02/05/2025 435  ms Final    ABO TYPE 02/05/2025 O   Final    Rh TYPE 02/05/2025 POS   Final    ANTIBODY SCREEN 02/05/2025 NEG   Final    POCT Glucose 02/05/2025 161 (H)  74 - 99 mg/dL Final    POCT Glucose 02/05/2025 166 (H)  74 - 99 mg/dL Final    POCT Glucose 02/05/2025 157 (H)  74 - 99 mg/dL Final    POCT Glucose 02/05/2025 169 (H)  74 - 99 mg/dL Final    POCT Glucose 02/06/2025 137 (H)  74 - 99 mg/dL Final    POCT Glucose 02/06/2025 163 (H)  74 - 99 mg/dL Final       Assessment/Plan   Problem List Items Addressed This Visit             ICD-10-CM    Hyperlipidemia E78.5    Benign essential hypertension I10    Depressive disorder F32.A     Other Visit Diagnoses         Codes    Type 2 diabetes mellitus without complication, with long-term current use of insulin (Multi)    -  Primary E11.9, Z79.4    Acute cystitis with hematuria     N30.01    Urothelial cancer (Multi)     C68.9          Doing well.  Refilled meds.  Follow up in 4 mo.

## 2025-04-15 ENCOUNTER — APPOINTMENT (OUTPATIENT)
Dept: ORTHOPEDIC SURGERY | Facility: CLINIC | Age: 75
End: 2025-04-15
Payer: MEDICARE

## 2025-04-30 ENCOUNTER — HOSPITAL ENCOUNTER (OUTPATIENT)
Dept: RADIOLOGY | Facility: CLINIC | Age: 75
Discharge: HOME | End: 2025-04-30
Payer: MEDICARE

## 2025-04-30 DIAGNOSIS — Z79.4 TYPE 2 DIABETES MELLITUS WITHOUT COMPLICATION, WITH LONG-TERM CURRENT USE OF INSULIN: ICD-10-CM

## 2025-04-30 DIAGNOSIS — Z72.0 TOBACCO USE: ICD-10-CM

## 2025-04-30 DIAGNOSIS — C68.9 UROTHELIAL CANCER (MULTI): ICD-10-CM

## 2025-04-30 DIAGNOSIS — E78.2 MIXED HYPERLIPIDEMIA: ICD-10-CM

## 2025-04-30 DIAGNOSIS — F32.A DEPRESSIVE DISORDER: ICD-10-CM

## 2025-04-30 DIAGNOSIS — I10 BENIGN ESSENTIAL HYPERTENSION: ICD-10-CM

## 2025-04-30 DIAGNOSIS — N30.01 ACUTE CYSTITIS WITH HEMATURIA: ICD-10-CM

## 2025-04-30 DIAGNOSIS — Z87.891 PERSONAL HISTORY OF NICOTINE DEPENDENCE: ICD-10-CM

## 2025-04-30 DIAGNOSIS — E11.9 TYPE 2 DIABETES MELLITUS WITHOUT COMPLICATION, WITH LONG-TERM CURRENT USE OF INSULIN: ICD-10-CM

## 2025-04-30 PROCEDURE — 71271 CT THORAX LUNG CANCER SCR C-: CPT

## 2025-05-04 DIAGNOSIS — R91.8 LUNG NODULE, MULTIPLE: ICD-10-CM

## 2025-05-04 DIAGNOSIS — Z87.891 PERSONAL HISTORY OF NICOTINE DEPENDENCE: ICD-10-CM

## 2025-05-04 DIAGNOSIS — E78.5 HYPERLIPIDEMIA, UNSPECIFIED HYPERLIPIDEMIA TYPE: Primary | ICD-10-CM

## 2025-05-05 ENCOUNTER — TELEPHONE (OUTPATIENT)
Dept: PRIMARY CARE | Facility: CLINIC | Age: 75
End: 2025-05-05
Payer: MEDICARE

## 2025-05-05 NOTE — PROGRESS NOTES
Subjective   Patient ID: Nina Oquendo is a 74 y.o. female who presents for No chief complaint on file..  HPI    Problem List[1]    Social Connections: Feeling Socially Integrated (3/12/2025)    OASIS : Social Isolation     Frequency of experiencing loneliness or isolation: Never   Recent Concern: Social Connections - Feeling Somewhat Isolated (2/7/2025)    OASIS : Social Isolation     Frequency of experiencing loneliness or isolation: Sometimes       Medications Ordered Prior to Encounter[2]     There were no vitals filed for this visit.  There were no vitals filed for this visit.    Review of Systems    Objective     Physical Exam    Orders Only on 03/21/2025   Component Date Value Ref Range Status    CHOLESTEROL, TOTAL 03/21/2025 157  <200 mg/dL Final    HDL CHOLESTEROL 03/21/2025 67  > OR = 50 mg/dL Final    TRIGLYCERIDES 03/21/2025 100  <150 mg/dL Final    LDL-CHOLESTEROL 03/21/2025 71  mg/dL (calc) Final    Comment: Reference range: <100     Desirable range <100 mg/dL for primary prevention;    <70 mg/dL for patients with CHD or diabetic patients   with > or = 2 CHD risk factors.     LDL-C is now calculated using the Cooper-Karl   calculation, which is a validated novel method providing   better accuracy than the Friedewald equation in the   estimation of LDL-C.   Cooper PERDOMO et al. MORIAH. 2013;310(19): 4310-1833   (http://education.[employer]/faq/YTT166)      CHOL/HDLC RATIO 03/21/2025 2.3  <5.0 (calc) Final    NON HDL CHOLESTEROL 03/21/2025 90  <130 mg/dL (calc) Final    Comment: For patients with diabetes plus 1 major ASCVD risk   factor, treating to a non-HDL-C goal of <100 mg/dL   (LDL-C of <70 mg/dL) is considered a therapeutic   option.      GLUCOSE 03/21/2025 159 (H)  65 - 99 mg/dL Final    Comment:               Fasting reference interval     For someone without known diabetes, a glucose  value >125 mg/dL indicates that they may have  diabetes and this should be confirmed with  a  follow-up test.         UREA NITROGEN (BUN) 03/21/2025 21  7 - 25 mg/dL Final    CREATININE 03/21/2025 0.69  0.60 - 1.00 mg/dL Final    EGFR 03/21/2025 92  > OR = 60 mL/min/1.73m2 Final    SODIUM 03/21/2025 141  135 - 146 mmol/L Final    POTASSIUM 03/21/2025 4.8  3.5 - 5.3 mmol/L Final    CHLORIDE 03/21/2025 104  98 - 110 mmol/L Final    CARBON DIOXIDE 03/21/2025 26  20 - 32 mmol/L Final    ELECTROLYTE BALANCE 03/21/2025 11  7 - 17 mmol/L (calc) Final    CALCIUM 03/21/2025 9.6  8.6 - 10.4 mg/dL Final    PROTEIN, TOTAL 03/21/2025 7.0  6.1 - 8.1 g/dL Final    ALBUMIN 03/21/2025 4.5  3.6 - 5.1 g/dL Final    BILIRUBIN, TOTAL 03/21/2025 0.5  0.2 - 1.2 mg/dL Final    ALKALINE PHOSPHATASE 03/21/2025 95  37 - 153 U/L Final    AST 03/21/2025 17  10 - 35 U/L Final    ALT 03/21/2025 11  6 - 29 U/L Final    HEMOGLOBIN A1c 03/21/2025 6.5 (H)  <5.7 % of total Hgb Final    Comment: For someone without known diabetes, a hemoglobin A1c  value of 6.5% or greater indicates that they may have   diabetes and this should be confirmed with a follow-up   test.     For someone with known diabetes, a value <7% indicates   that their diabetes is well controlled and a value   greater than or equal to 7% indicates suboptimal   control. A1c targets should be individualized based on   duration of diabetes, age, comorbid conditions, and   other considerations.     Currently, no consensus exists regarding use of  hemoglobin A1c for diagnosis of diabetes for children.          eAG (mg/dL) 03/21/2025 140  mg/dL Final    eAG (mmol/L) 03/21/2025 7.7  mmol/L Final   Telephone on 03/13/2025   Component Date Value Ref Range Status    COLOR 03/21/2025 DARK YELLOW  YELLOW Final    APPEARANCE 03/21/2025 TURBID (A)  CLEAR Final    SPECIFIC GRAVITY 03/21/2025 1.027  1.001 - 1.035 Final    PH 03/21/2025 < OR = 5.0  5.0 - 8.0 Final    GLUCOSE 03/21/2025 NEGATIVE  NEGATIVE Final    BILIRUBIN 03/21/2025 NEGATIVE  NEGATIVE Final    KETONES 03/21/2025 TRACE (A)   NEGATIVE Final    OCCULT BLOOD 03/21/2025 1+ (A)  NEGATIVE Final    PROTEIN 03/21/2025 1+ (A)  NEGATIVE Final    NITRITE 03/21/2025 NEGATIVE  NEGATIVE Final    LEUKOCYTE ESTERASE 03/21/2025 3+ (A)  NEGATIVE Final    WBC 03/21/2025 > OR = 60 (A)  < OR = 5 /HPF Final    RBC 03/21/2025 3-10 (A)  < OR = 2 /HPF Final    SQUAMOUS EPITHELIAL CELLS 03/21/2025 6-10 (A)  < OR = 5 /HPF Final    BACTERIA 03/21/2025 MANY (A)  NONE SEEN /HPF Final    HYALINE CAST 03/21/2025 6-10 (A)  NONE SEEN /LPF Final    NOTE 03/21/2025    Final    Comment: This urine was analyzed for the presence of WBC,   RBC, bacteria, casts, and other formed elements.   Only those elements seen were reported.               Assessment/Plan          [1]   Patient Active Problem List  Diagnosis    Type 2 diabetes mellitus with diabetic neuropathy, with long-term current use of insulin    Hyperlipidemia    Chronic obstructive lung disease (Multi)    Benign essential hypertension    Essential hypertension    Depressive disorder    Protein-calorie malnutrition, unspecified severity (Multi)    Anxiety disorder, unspecified    Gastroesophageal reflux disease without esophagitis    Malignant neoplasm of urinary bladder (Multi)    Other specified disorders of adrenal gland    Adenoma of left adrenal gland    Primary osteoarthritis of right hip   [2]   Current Outpatient Medications on File Prior to Visit   Medication Sig Dispense Refill    acetaminophen (Tylenol) 325 mg tablet Take 3 tablets (975 mg) by mouth every 8 hours if needed for mild pain (1 - 3). 90 tablet 1    atorvastatin (Lipitor) 40 mg tablet Take 1 tablet (40 mg) by mouth once daily. 90 tablet 3    blood-glucose sensor (FreeStyle Mukund 3 Sensor) device Apply under skin [id number] 6 each 3    buPROPion XL (Wellbutrin XL) 150 mg 24 hr tablet Take 1 tablet (150 mg) by mouth once daily in the morning. Do not crush, chew, or split. 90 tablet 1    DULoxetine (Cymbalta) 30 mg DR capsule Take 1 capsule (30 mg) by  "mouth once daily. Do not crush or chew. 90 capsule 1    insulin lispro (HumaLOG) 100 unit/mL injection Inject 5 Units under the skin 3 times daily (morning, midday, late afternoon). Take as directed per insulin instructions 13.5 mL 3    metoprolol succinate XL (Toprol-XL) 50 mg 24 hr tablet Take 1 tablet (50 mg) by mouth once daily. 90 tablet 3    omeprazole (PriLOSEC) 20 mg DR capsule Take 1 capsule (20 mg) by mouth early in the morning..      ondansetron (Zofran) 4 mg tablet Take 1 tablet (4 mg) by mouth every 8 hours if needed for nausea. 90 tablet 1    pantoprazole (ProtoNix) 40 mg EC tablet Take 1 tablet (40 mg) by mouth once daily as needed (heartburn). Do not crush, chew, or split. 30 tablet 0    pen needle, diabetic 31 gauge x 5/16\" needle 1 each 3 times a day. 300 each 3     No current facility-administered medications on file prior to visit.     "

## 2025-05-05 NOTE — TELEPHONE ENCOUNTER
----- Message from Christopher Sam sent at 5/4/2025  9:15 PM EDT -----  Pts CT scan showed some new nodules in her lungs.  They are small right now but they recommend rechecking it in 6 mo to make sure they are not changing.  Also had some increased plaques on the   vessels of her heart.  We will do a CA score in 6 mo to evaluate those as well.    ----- Message -----  From: Interface, Radiology Results In  Sent: 5/4/2025   2:52 PM EDT  To: Christopher Sam MD

## 2025-05-05 NOTE — RESULT ENCOUNTER NOTE
Pts CT scan showed some new nodules in her lungs.  They are small right now but they recommend rechecking it in 6 mo to make sure they are not changing.  Also had some increased plaques on the vessels of her heart.  We will do a CA score in 6 mo to evaluate those as well.

## 2025-05-06 ENCOUNTER — APPOINTMENT (OUTPATIENT)
Dept: ORTHOPEDIC SURGERY | Facility: CLINIC | Age: 75
End: 2025-05-06
Payer: MEDICARE

## 2025-05-06 ENCOUNTER — HOSPITAL ENCOUNTER (OUTPATIENT)
Dept: RADIOLOGY | Facility: CLINIC | Age: 75
Discharge: HOME | End: 2025-05-06
Payer: MEDICARE

## 2025-05-06 VITALS — HEIGHT: 65 IN | WEIGHT: 123 LBS | BODY MASS INDEX: 20.49 KG/M2

## 2025-05-06 DIAGNOSIS — M16.11 PRIMARY OSTEOARTHRITIS OF RIGHT HIP: ICD-10-CM

## 2025-05-06 PROCEDURE — 1159F MED LIST DOCD IN RCRD: CPT | Performed by: ORTHOPAEDIC SURGERY

## 2025-05-06 PROCEDURE — 73502 X-RAY EXAM HIP UNI 2-3 VIEWS: CPT | Mod: RT

## 2025-05-06 PROCEDURE — 3008F BODY MASS INDEX DOCD: CPT | Performed by: ORTHOPAEDIC SURGERY

## 2025-05-06 PROCEDURE — 99024 POSTOP FOLLOW-UP VISIT: CPT | Performed by: ORTHOPAEDIC SURGERY

## 2025-05-06 NOTE — PROGRESS NOTES
ORTHOPEDIC TOTAL JOINT  POST-OPERATIVE FOLLOW UP      ============================  IMPRESSION/PLAN:  ============================  74 y.o. female s/p Right Total Hip Replacement completed on 2/5/2025    PLAN:  -Weightbearing: Weight bearing as tolerated  -DVT Prophylaxis: No longer needed  -Radiology Studies: Reviewed today  -Therapies: Continue regular exercise program  -Transition off assistive device as seen fit by patient and therapy  -Follow-up:  1 year matthew of surgery with x-rays or sooner if issues arise        Nina Oquendo presents today for follow up of joint replacement above. Pain controlled with current treatment plan. Patient has completed physical therapy and are doing exercises on their own. They are currently ambulating with occasional cane but for blood pressure issues. She states she is doing well with no pain. No new injuries. XR done today.    Review of Systems:   Constitutional: See HPI for pain assessment, No significant weight loss, recent trauma. Denies fevers/chills  Cardiovascular: No chest pain, shortness of breath  Respiratory: No difficulty breathing, cough  Gastrointestinal: No nausea, vomiting, diarrhea, constipation  Musculoskeletal: Noted in HPI, no arthralgias   Integumentary: No rashes, easy bruising, redness   Neurological: no numbness or tingling in extremities, no gait disturbances     Patient Active Problem List   Diagnosis    Type 2 diabetes mellitus with diabetic neuropathy, with long-term current use of insulin    Hyperlipidemia    Chronic obstructive lung disease (Multi)    Benign essential hypertension    Essential hypertension    Depressive disorder    Protein-calorie malnutrition, unspecified severity (Multi)    Anxiety disorder, unspecified    Gastroesophageal reflux disease without esophagitis    Malignant neoplasm of urinary bladder (Multi)    Other specified disorders of adrenal gland    Adenoma of left adrenal gland    Primary osteoarthritis of right hip        =================================  EXAM  =================================  GENERAL: A/Ox3, NAD. Appears healthy, well nourished  CARDIAC: regular rate  LUNGS: Breathing non-labored    MUSCULOSKELETAL:  Laterality: right Hip exam  - Strength: Abduction 5/5, Flexion 5/5  - Palpation: non TTP along surgical site  - Incision: No overlying, erythema, induration, or drainage. Incision healing well with no wound dehiscence  - EHL/PF/DF motor intact  - compartments soft, negative homans  - Gait: using no assistive device    NEUROVASCULAR:  - Neurovascular Status: sensation intact to light touch distally  - Capillary refill brisk at extremities, Bilateral dorsalis pedis pulse 2+     IMAGING: Multi views right hip and pelvis reviewed today which demonstrate no signs of loosening or failure of right total hip arthroplasty. X-rays were personally reviewed by me.  Radiology reports were reviewed by me as well, if available at the time.        Harshal Sanchez DO  Attending Surgeon  Joint Replacement and Adult Reconstructive Surgery  [place], [place]

## 2025-05-21 ENCOUNTER — TELEPHONE (OUTPATIENT)
Dept: PRIMARY CARE | Facility: CLINIC | Age: 75
End: 2025-05-21
Payer: MEDICARE

## 2025-05-21 NOTE — TELEPHONE ENCOUNTER
Medication was sent to [facility] on 3/26/25 for 1 year supply  Called and spoke with pt and informed her rx should be at pharmacy. Thanks. JW

## 2025-05-21 NOTE — TELEPHONE ENCOUNTER
Patient is asking for a refill on metoprolol succinate XL (Toprol-XL) 50 mg 24 hr tablet   [facility] Has 6 pills

## 2025-06-02 ENCOUNTER — APPOINTMENT (OUTPATIENT)
Dept: PRIMARY CARE | Facility: CLINIC | Age: 75
End: 2025-06-02
Payer: MEDICARE

## 2025-06-02 VITALS
SYSTOLIC BLOOD PRESSURE: 108 MMHG | HEART RATE: 78 BPM | DIASTOLIC BLOOD PRESSURE: 64 MMHG | BODY MASS INDEX: 20.79 KG/M2 | OXYGEN SATURATION: 98 % | WEIGHT: 123 LBS | TEMPERATURE: 97.6 F

## 2025-06-02 DIAGNOSIS — R35.0 FREQUENCY OF URINATION: Primary | ICD-10-CM

## 2025-06-02 LAB
POC APPEARANCE, URINE: CLEAR
POC BILIRUBIN, URINE: ABNORMAL
POC BLOOD, URINE: ABNORMAL
POC COLOR, URINE: ABNORMAL
POC GLUCOSE, URINE: NEGATIVE MG/DL
POC KETONES, URINE: ABNORMAL MG/DL
POC LEUKOCYTES, URINE: NEGATIVE
POC NITRITE,URINE: NEGATIVE
POC PH, URINE: 6 PH
POC PROTEIN, URINE: NEGATIVE MG/DL
POC SPECIFIC GRAVITY, URINE: 1.02
POC UROBILINOGEN, URINE: 1 EU/DL

## 2025-06-02 PROCEDURE — G2211 COMPLEX E/M VISIT ADD ON: HCPCS | Performed by: FAMILY MEDICINE

## 2025-06-02 PROCEDURE — 81003 URINALYSIS AUTO W/O SCOPE: CPT | Performed by: FAMILY MEDICINE

## 2025-06-02 PROCEDURE — 3078F DIAST BP <80 MM HG: CPT | Performed by: FAMILY MEDICINE

## 2025-06-02 PROCEDURE — 3074F SYST BP LT 130 MM HG: CPT | Performed by: FAMILY MEDICINE

## 2025-06-02 PROCEDURE — 99213 OFFICE O/P EST LOW 20 MIN: CPT | Performed by: FAMILY MEDICINE

## 2025-06-02 PROCEDURE — 1159F MED LIST DOCD IN RCRD: CPT | Performed by: FAMILY MEDICINE

## 2025-06-02 RX ORDER — TOLTERODINE 4 MG/1
4 CAPSULE, EXTENDED RELEASE ORAL DAILY
Qty: 90 CAPSULE | Refills: 1 | Status: SHIPPED | OUTPATIENT
Start: 2025-06-02 | End: 2025-11-29

## 2025-06-02 ASSESSMENT — ENCOUNTER SYMPTOMS: FREQUENCY: 1

## 2025-06-02 NOTE — PROGRESS NOTES
"Subjective   Patient ID: Nina Oquendo is a 74 y.o. female who presents for Urinary Frequency (Bladder leakage x \" a while\").  Urinary Frequency   Associated symptoms include frequency.       Pt has been having struggles with urinary incontinence.  Has been leaking.  Going through diapers.  No dysuria.  Urge and pressure incontinence.    Problem List[1]    Social Connections: Feeling Socially Integrated (3/12/2025)    OASIS : Social Isolation     Frequency of experiencing loneliness or isolation: Never   Recent Concern: Social Connections - Feeling Somewhat Isolated (2/7/2025)    OASIS : Social Isolation     Frequency of experiencing loneliness or isolation: Sometimes       Medications Ordered Prior to Encounter[2]     Vitals:    06/02/25 1054   BP: 108/64   Pulse: 78   Temp: 36.4 °C (97.6 °F)   SpO2: 98%     Vitals:    06/02/25 1054   Weight: 55.8 kg (123 lb)       Review of Systems   Genitourinary:  Positive for frequency.   All other systems reviewed and are negative.      Objective     Physical Exam  Constitutional:       Appearance: Normal appearance. She is well-developed.   HENT:      Head: Atraumatic.   Cardiovascular:      Rate and Rhythm: Normal rate and regular rhythm.      Heart sounds: Normal heart sounds. No murmur heard.  Pulmonary:      Effort: Pulmonary effort is normal.      Breath sounds: Normal breath sounds.   Abdominal:      General: Bowel sounds are normal.      Palpations: Abdomen is soft.   Skin:     General: Skin is warm.   Neurological:      General: No focal deficit present.      Mental Status: She is alert.   Psychiatric:         Mood and Affect: Mood normal.         Office Visit on 06/02/2025   Component Date Value Ref Range Status    POC Color, Urine 06/02/2025 Phyllis (A)  Straw, Yellow, Light-Yellow Final    POC Appearance, Urine 06/02/2025 Clear  Clear Final    POC Glucose, Urine 06/02/2025 NEGATIVE  NEGATIVE mg/dl Final    POC Bilirubin, Urine 06/02/2025 SMALL (1+) (A)  " NEGATIVE Final    POC Ketones, Urine 06/02/2025 TRACE (A)  NEGATIVE mg/dl Final    POC Specific Gravity, Urine 06/02/2025 1.020  1.005 - 1.035 Final    POC Blood, Urine 06/02/2025 TRACE-Intact (A)  NEGATIVE Final    POC PH, Urine 06/02/2025 6.0  No Reference Range Established PH Final    POC Protein, Urine 06/02/2025 NEGATIVE  NEGATIVE mg/dl Final    POC Urobilinogen, Urine 06/02/2025 1.0  0.2, 1.0 EU/DL Final    Poc Nitrite, Urine 06/02/2025 NEGATIVE  NEGATIVE Final    POC Leukocytes, Urine 06/02/2025 NEGATIVE  NEGATIVE Final       Assessment/Plan   Problem List Items Addressed This Visit    None  Visit Diagnoses         Codes      Frequency of urination    -  Primary R35.0    Relevant Medications    tolterodine LA (Detrol LA) 4 mg 24 hr capsule    Other Relevant Orders    POCT UA Automated manually resulted (Completed)    Referral to Urogynecology          Sent in [facility].  Referring to Dr. Gay.       [1]   Patient Active Problem List  Diagnosis    Type 2 diabetes mellitus with diabetic neuropathy, with long-term current use of insulin    Hyperlipidemia    Chronic obstructive lung disease (Multi)    Benign essential hypertension    Essential hypertension    Depressive disorder    Protein-calorie malnutrition, unspecified severity (Multi)    Anxiety disorder, unspecified    Gastroesophageal reflux disease without esophagitis    Malignant neoplasm of urinary bladder (Multi)    Other specified disorders of adrenal gland    Adenoma of left adrenal gland    Primary osteoarthritis of right hip   [2]   Current Outpatient Medications on File Prior to Visit   Medication Sig Dispense Refill    acetaminophen (Tylenol) 325 mg tablet Take 3 tablets (975 mg) by mouth every 8 hours if needed for mild pain (1 - 3). 90 tablet 1    atorvastatin (Lipitor) 40 mg tablet Take 1 tablet (40 mg) by mouth once daily. 90 tablet 3    blood-glucose sensor (FreeStyle Mukund 3 Sensor) device Apply under skin [id number] each 3    buPROPion XL  "(Wellbutrin XL) 150 mg 24 hr tablet Take 1 tablet (150 mg) by mouth once daily in the morning. Do not crush, chew, or split. 90 tablet 1    DULoxetine (Cymbalta) 30 mg DR capsule Take 1 capsule (30 mg) by mouth once daily. Do not crush or chew. 90 capsule 1    insulin lispro (HumaLOG) 100 unit/mL injection Inject 5 Units under the skin 3 times daily (morning, midday, late afternoon). Take as directed per insulin instructions 13.5 mL 3    metoprolol succinate XL (Toprol-XL) 50 mg 24 hr tablet Take 1 tablet (50 mg) by mouth once daily. 90 tablet 3    omeprazole (PriLOSEC) 20 mg DR capsule Take 1 capsule (20 mg) by mouth early in the morning..      ondansetron (Zofran) 4 mg tablet Take 1 tablet (4 mg) by mouth every 8 hours if needed for nausea. 90 tablet 1    pen needle, diabetic 31 gauge x 5/16\" needle 1 each 3 times a day. 300 each 3    pantoprazole (ProtoNix) 40 mg EC tablet Take 1 tablet (40 mg) by mouth once daily as needed (heartburn). Do not crush, chew, or split. 30 tablet 0     No current facility-administered medications on file prior to visit.     "

## 2025-06-10 DIAGNOSIS — E11.9 TYPE 2 DIABETES MELLITUS WITHOUT COMPLICATION, WITH LONG-TERM CURRENT USE OF INSULIN: ICD-10-CM

## 2025-06-10 DIAGNOSIS — F32.A DEPRESSIVE DISORDER: ICD-10-CM

## 2025-06-10 DIAGNOSIS — E78.2 MIXED HYPERLIPIDEMIA: ICD-10-CM

## 2025-06-10 DIAGNOSIS — Z72.0 TOBACCO USE: ICD-10-CM

## 2025-06-10 DIAGNOSIS — N30.01 ACUTE CYSTITIS WITH HEMATURIA: ICD-10-CM

## 2025-06-10 DIAGNOSIS — I10 BENIGN ESSENTIAL HYPERTENSION: ICD-10-CM

## 2025-06-10 DIAGNOSIS — C68.9 UROTHELIAL CANCER (MULTI): ICD-10-CM

## 2025-06-10 DIAGNOSIS — Z79.4 TYPE 2 DIABETES MELLITUS WITHOUT COMPLICATION, WITH LONG-TERM CURRENT USE OF INSULIN: ICD-10-CM

## 2025-07-25 LAB
ALBUMIN SERPL-MCNC: 4.4 G/DL (ref 3.6–5.1)
ALP SERPL-CCNC: 88 U/L (ref 37–153)
ALT SERPL-CCNC: 9 U/L (ref 6–29)
ANION GAP SERPL CALCULATED.4IONS-SCNC: 9 MMOL/L (CALC) (ref 7–17)
AST SERPL-CCNC: 15 U/L (ref 10–35)
BASOPHILS # BLD AUTO: 50 CELLS/UL (ref 0–200)
BASOPHILS NFR BLD AUTO: 0.7 %
BILIRUB SERPL-MCNC: 0.7 MG/DL (ref 0.2–1.2)
BUN SERPL-MCNC: 23 MG/DL (ref 7–25)
CALCIUM SERPL-MCNC: 9.5 MG/DL (ref 8.6–10.4)
CHLORIDE SERPL-SCNC: 104 MMOL/L (ref 98–110)
CHOLEST SERPL-MCNC: 168 MG/DL
CHOLEST/HDLC SERPL: 2.2 (CALC)
CO2 SERPL-SCNC: 26 MMOL/L (ref 20–32)
CREAT SERPL-MCNC: 0.88 MG/DL (ref 0.6–1)
EGFRCR SERPLBLD CKD-EPI 2021: 69 ML/MIN/1.73M2
EOSINOPHIL # BLD AUTO: 99 CELLS/UL (ref 15–500)
EOSINOPHIL NFR BLD AUTO: 1.4 %
ERYTHROCYTE [DISTWIDTH] IN BLOOD BY AUTOMATED COUNT: 12.5 % (ref 11–15)
EST. AVERAGE GLUCOSE BLD GHB EST-MCNC: 157 MG/DL
EST. AVERAGE GLUCOSE BLD GHB EST-SCNC: 8.7 MMOL/L
GLUCOSE SERPL-MCNC: 155 MG/DL (ref 65–99)
HBA1C MFR BLD: 7.1 %
HCT VFR BLD AUTO: 45.5 % (ref 35–45)
HDLC SERPL-MCNC: 75 MG/DL
HGB BLD-MCNC: 14.4 G/DL (ref 11.7–15.5)
LDLC SERPL CALC-MCNC: 78 MG/DL (CALC)
LYMPHOCYTES # BLD AUTO: 1605 CELLS/UL (ref 850–3900)
LYMPHOCYTES NFR BLD AUTO: 22.6 %
MCH RBC QN AUTO: 31.5 PG (ref 27–33)
MCHC RBC AUTO-ENTMCNC: 31.6 G/DL (ref 32–36)
MCV RBC AUTO: 99.6 FL (ref 80–100)
MONOCYTES # BLD AUTO: 398 CELLS/UL (ref 200–950)
MONOCYTES NFR BLD AUTO: 5.6 %
NEUTROPHILS # BLD AUTO: 4949 CELLS/UL (ref 1500–7800)
NEUTROPHILS NFR BLD AUTO: 69.7 %
NONHDLC SERPL-MCNC: 93 MG/DL (CALC)
PLATELET # BLD AUTO: 241 THOUSAND/UL (ref 140–400)
PMV BLD REES-ECKER: 10.3 FL (ref 7.5–12.5)
POTASSIUM SERPL-SCNC: 4.9 MMOL/L (ref 3.5–5.3)
PROT SERPL-MCNC: 7.1 G/DL (ref 6.1–8.1)
RBC # BLD AUTO: 4.57 MILLION/UL (ref 3.8–5.1)
SODIUM SERPL-SCNC: 139 MMOL/L (ref 135–146)
TRIGL SERPL-MCNC: 70 MG/DL
WBC # BLD AUTO: 7.1 THOUSAND/UL (ref 3.8–10.8)

## 2025-07-28 ENCOUNTER — APPOINTMENT (OUTPATIENT)
Dept: PRIMARY CARE | Facility: CLINIC | Age: 75
End: 2025-07-28
Payer: MEDICARE

## 2025-07-28 VITALS
HEIGHT: 64 IN | BODY MASS INDEX: 21.17 KG/M2 | HEART RATE: 74 BPM | DIASTOLIC BLOOD PRESSURE: 54 MMHG | TEMPERATURE: 97.4 F | SYSTOLIC BLOOD PRESSURE: 86 MMHG | OXYGEN SATURATION: 99 % | WEIGHT: 124 LBS

## 2025-07-28 DIAGNOSIS — F32.A DEPRESSIVE DISORDER: ICD-10-CM

## 2025-07-28 DIAGNOSIS — Z12.11 SCREENING FOR MALIGNANT NEOPLASM OF COLON: Primary | ICD-10-CM

## 2025-07-28 DIAGNOSIS — E11.9 TYPE 2 DIABETES MELLITUS WITHOUT COMPLICATION, WITH LONG-TERM CURRENT USE OF INSULIN: ICD-10-CM

## 2025-07-28 DIAGNOSIS — I10 BENIGN ESSENTIAL HYPERTENSION: ICD-10-CM

## 2025-07-28 DIAGNOSIS — Z00.00 ROUTINE ADULT HEALTH MAINTENANCE: ICD-10-CM

## 2025-07-28 DIAGNOSIS — Z00.00 MEDICARE ANNUAL WELLNESS VISIT, SUBSEQUENT: ICD-10-CM

## 2025-07-28 DIAGNOSIS — Z79.4 TYPE 2 DIABETES MELLITUS WITHOUT COMPLICATION, WITH LONG-TERM CURRENT USE OF INSULIN: ICD-10-CM

## 2025-07-28 DIAGNOSIS — F03.A0 MILD DEMENTIA WITHOUT BEHAVIORAL DISTURBANCE, PSYCHOTIC DISTURBANCE, MOOD DISTURBANCE, OR ANXIETY, UNSPECIFIED DEMENTIA TYPE: ICD-10-CM

## 2025-07-28 DIAGNOSIS — E78.2 MIXED HYPERLIPIDEMIA: ICD-10-CM

## 2025-07-28 DIAGNOSIS — G31.84 MCI (MILD COGNITIVE IMPAIRMENT): ICD-10-CM

## 2025-07-28 PROCEDURE — 1159F MED LIST DOCD IN RCRD: CPT | Performed by: FAMILY MEDICINE

## 2025-07-28 PROCEDURE — 3074F SYST BP LT 130 MM HG: CPT | Performed by: FAMILY MEDICINE

## 2025-07-28 PROCEDURE — G0439 PPPS, SUBSEQ VISIT: HCPCS | Performed by: FAMILY MEDICINE

## 2025-07-28 PROCEDURE — 99214 OFFICE O/P EST MOD 30 MIN: CPT | Performed by: FAMILY MEDICINE

## 2025-07-28 PROCEDURE — 1170F FXNL STATUS ASSESSED: CPT | Performed by: FAMILY MEDICINE

## 2025-07-28 PROCEDURE — 3078F DIAST BP <80 MM HG: CPT | Performed by: FAMILY MEDICINE

## 2025-07-28 PROCEDURE — 3008F BODY MASS INDEX DOCD: CPT | Performed by: FAMILY MEDICINE

## 2025-07-28 PROCEDURE — G2211 COMPLEX E/M VISIT ADD ON: HCPCS | Performed by: FAMILY MEDICINE

## 2025-07-28 RX ORDER — METOPROLOL SUCCINATE 50 MG/1
50 TABLET, EXTENDED RELEASE ORAL DAILY
Qty: 90 TABLET | Refills: 3 | Status: SHIPPED | OUTPATIENT
Start: 2025-07-28 | End: 2026-07-28

## 2025-07-28 RX ORDER — DULOXETIN HYDROCHLORIDE 30 MG/1
30 CAPSULE, DELAYED RELEASE ORAL DAILY
Qty: 90 CAPSULE | Refills: 1 | Status: SHIPPED | OUTPATIENT
Start: 2025-07-28 | End: 2026-01-24

## 2025-07-28 RX ORDER — ATORVASTATIN CALCIUM 40 MG/1
40 TABLET, FILM COATED ORAL DAILY
Qty: 90 TABLET | Refills: 3 | Status: SHIPPED | OUTPATIENT
Start: 2025-07-28 | End: 2026-07-28

## 2025-07-28 RX ORDER — BLOOD-GLUCOSE SENSOR
EACH MISCELLANEOUS
Qty: 6 EACH | Refills: 3 | Status: SHIPPED | OUTPATIENT
Start: 2025-07-28

## 2025-07-28 RX ORDER — BUPROPION HYDROCHLORIDE 150 MG/1
150 TABLET ORAL EVERY MORNING
Qty: 90 TABLET | Refills: 1 | Status: SHIPPED | OUTPATIENT
Start: 2025-07-28 | End: 2026-01-24

## 2025-07-28 ASSESSMENT — PATIENT HEALTH QUESTIONNAIRE - PHQ9
SUM OF ALL RESPONSES TO PHQ9 QUESTIONS 1 AND 2: 0
2. FEELING DOWN, DEPRESSED OR HOPELESS: NOT AT ALL
1. LITTLE INTEREST OR PLEASURE IN DOING THINGS: NOT AT ALL

## 2025-07-28 ASSESSMENT — ACTIVITIES OF DAILY LIVING (ADL)
MANAGING_FINANCES: INDEPENDENT
DOING_HOUSEWORK: INDEPENDENT
GROCERY_SHOPPING: INDEPENDENT
TAKING_MEDICATION: INDEPENDENT
BATHING: INDEPENDENT
DRESSING: INDEPENDENT

## 2025-07-28 NOTE — PROGRESS NOTES
Subjective   Patient ID: Nina Oquendo is a 75 y.o. female who presents for Medicare Annual Wellness Visit Subsequent (Recheck), Diabetes (Review BW), Hypertension, and Hyperlipidemia.  Diabetes    Preparing meals - independent  Using telephone - independent  Bladder - continent  Bowel - continent    Recent hospital stays - none    ADLs - able to dress, walk and bath independently  Instrumental ADL's - able to shop, maintain finances, managing medications, housekeeping independently    Depression: PHQ-2 (screening 2 mins) - negative    Opioid use -   none  Exercise -  not much  Diet - well balanced  Pain score -    Providers    [place] - 13/22    Education - educated pt on healthy eating, exercise, weight loss    Falls - 2 falls this year      1. DM: a1c was 7.1.  Weight is down 3 lbs.    2. Lipids: denies chest pain. taking medication.     3. Tobacco use: still smoking    4. HTN: running a bit low    5. Adrenal carcinoma: now cancer free.            Counseled pt on living will: has living will    CV screening: CA score was low in 2017    Colonoscopy: needs another on 2025    Diabetes screening: a1c was 7.3.    Glaucoma screen: rec'ed seeing optho for her DM2    CT chest tob screen: screening CT ordered last year but didn't do it.    Mammo: ok in 2024    DEXA: Osteopenia in 2024    PAP/pelvis: s/p hysterectomy    Vaccines:  had flu, PNA shots. COVID boosted    Past Medical History:   Diagnosis Date    Anxiety     Arthritis     COPD (chronic obstructive pulmonary disease) (Multi)     Depression     GERD (gastroesophageal reflux disease)     Hyperlipidemia     Hypertension     Type 2 diabetes mellitus without complications 12/04/2013    Diabetes mellitus       Social Connections: Feeling Socially Integrated (3/12/2025)    OASIS : Social Isolation     Frequency of experiencing loneliness or isolation: Never   Recent Concern: Social Connections - Feeling Somewhat Isolated (2/7/2025)    OASIS : Social Isolation  "    Frequency of experiencing loneliness or isolation: Sometimes       Current Outpatient Medications on File Prior to Visit   Medication Sig Dispense Refill    acetaminophen (Tylenol) 325 mg tablet Take 3 tablets (975 mg) by mouth every 8 hours if needed for mild pain (1 - 3). 90 tablet 1    insulin lispro (HumaLOG) 100 unit/mL injection Inject 5 Units under the skin 3 times daily (morning, midday, late afternoon). Take as directed per insulin instructions 13.5 mL 3    ondansetron (Zofran) 4 mg tablet Take 1 tablet (4 mg) by mouth every 8 hours if needed for nausea. 90 tablet 1    pen needle, diabetic 31 gauge x 5/16\" needle 1 each 3 times a day. 300 each 3    tolterodine LA (Detrol LA) 4 mg 24 hr capsule Take 1 capsule (4 mg) by mouth once daily. Do not crush, chew, or split. 90 capsule 1    [DISCONTINUED] atorvastatin (Lipitor) 40 mg tablet Take 1 tablet (40 mg) by mouth once daily. 90 tablet 3    [DISCONTINUED] blood-glucose sensor (FreeStyle Mukund 3 Sensor) device Apply under skin [id number] each 3    [DISCONTINUED] buPROPion XL (Wellbutrin XL) 150 mg 24 hr tablet Take 1 tablet (150 mg) by mouth once daily in the morning. Do not crush, chew, or split. 90 tablet 1    [DISCONTINUED] DULoxetine (Cymbalta) 30 mg DR capsule Take 1 capsule (30 mg) by mouth once daily. Do not crush or chew. 90 capsule 1    [DISCONTINUED] metoprolol succinate XL (Toprol-XL) 50 mg 24 hr tablet Take 1 tablet (50 mg) by mouth once daily. 90 tablet 3    [DISCONTINUED] omeprazole (PriLOSEC) 20 mg DR capsule Take 1 capsule (20 mg) by mouth early in the morning..      [DISCONTINUED] pantoprazole (ProtoNix) 40 mg EC tablet Take 1 tablet (40 mg) by mouth once daily as needed (heartburn). Do not crush, chew, or split. 30 tablet 0     No current facility-administered medications on file prior to visit.        Vitals:    07/28/25 0831   BP: 86/54   Pulse: 74   Temp: 36.3 °C (97.4 °F)   SpO2: 99%     Vitals:    07/28/25 0831   Weight: 56.2 kg (124 " lb)         Review of Systems   All other systems reviewed and are negative.      Objective     Physical Exam  Vitals reviewed.   Constitutional:       General: She is not in acute distress.     Appearance: Normal appearance. She is well-developed. She is not diaphoretic.   HENT:      Head: Normocephalic and atraumatic.      Right Ear: Tympanic membrane normal.      Left Ear: Tympanic membrane normal.      Nose: Nose normal.      Mouth/Throat:      Mouth: Mucous membranes are moist.     Eyes:      Pupils: Pupils are equal, round, and reactive to light.       Cardiovascular:      Rate and Rhythm: Normal rate and regular rhythm.      Heart sounds: Normal heart sounds. No murmur heard.     No friction rub. No gallop.   Pulmonary:      Effort: Pulmonary effort is normal.      Breath sounds: Normal breath sounds. No rales.   Abdominal:      General: Bowel sounds are normal.      Palpations: Abdomen is soft.      Tenderness: There is no abdominal tenderness.     Musculoskeletal:      Cervical back: Normal range of motion and neck supple.     Skin:     General: Skin is warm and dry.     Neurological:      Mental Status: She is alert.     Psychiatric:         Mood and Affect: Mood normal.         Orders Only on 06/10/2025   Component Date Value Ref Range Status    HEMOGLOBIN A1c 07/24/2025 7.1 (H)  <5.7 % Final    Comment: For someone without known diabetes, a hemoglobin A1c  value of 6.5% or greater indicates that they may have   diabetes and this should be confirmed with a follow-up   test.     For someone with known diabetes, a value <7% indicates   that their diabetes is well controlled and a value   greater than or equal to 7% indicates suboptimal   control. A1c targets should be individualized based on   duration of diabetes, age, comorbid conditions, and   other considerations.     Currently, no consensus exists regarding use of  hemoglobin A1c for diagnosis of diabetes for children.          eAG (mg/dL) 07/24/2025  157  mg/dL Final    eAG (mmol/L) 07/24/2025 8.7  mmol/L Final    GLUCOSE 07/24/2025 155 (H)  65 - 99 mg/dL Final    Comment:               Fasting reference interval     For someone without known diabetes, a glucose  value >125 mg/dL indicates that they may have  diabetes and this should be confirmed with a  follow-up test.         UREA NITROGEN (BUN) 07/24/2025 23  7 - 25 mg/dL Final    CREATININE 07/24/2025 0.88  0.60 - 1.00 mg/dL Final    EGFR 07/24/2025 69  > OR = 60 mL/min/1.73m2 Final    SODIUM 07/24/2025 139  135 - 146 mmol/L Final    POTASSIUM 07/24/2025 4.9  3.5 - 5.3 mmol/L Final    CHLORIDE 07/24/2025 104  98 - 110 mmol/L Final    CARBON DIOXIDE 07/24/2025 26  20 - 32 mmol/L Final    ELECTROLYTE BALANCE 07/24/2025 9  7 - 17 mmol/L (calc) Final    CALCIUM 07/24/2025 9.5  8.6 - 10.4 mg/dL Final    PROTEIN, TOTAL 07/24/2025 7.1  6.1 - 8.1 g/dL Final    ALBUMIN 07/24/2025 4.4  3.6 - 5.1 g/dL Final    BILIRUBIN, TOTAL 07/24/2025 0.7  0.2 - 1.2 mg/dL Final    ALKALINE PHOSPHATASE 07/24/2025 88  37 - 153 U/L Final    AST 07/24/2025 15  10 - 35 U/L Final    ALT 07/24/2025 9  6 - 29 U/L Final    WHITE BLOOD CELL COUNT 07/24/2025 7.1  3.8 - 10.8 Thousand/uL Final    RED BLOOD CELL COUNT 07/24/2025 4.57  3.80 - 5.10 Million/uL Final    HEMOGLOBIN 07/24/2025 14.4  11.7 - 15.5 g/dL Final    HEMATOCRIT 07/24/2025 45.5 (H)  35.0 - 45.0 % Final    MCV 07/24/2025 99.6  80.0 - 100.0 fL Final    MCH 07/24/2025 31.5  27.0 - 33.0 pg Final    MCHC 07/24/2025 31.6 (L)  32.0 - 36.0 g/dL Final    Comment: For adults, a slight decrease in the calculated MCHC  value (in the range of 30 to 32 g/dL) is most likely  not clinically significant; however, it should be  interpreted with caution in correlation with other  red cell parameters and the patient's clinical  condition.      RDW 07/24/2025 12.5  11.0 - 15.0 % Final    PLATELET COUNT 07/24/2025 241  140 - 400 Thousand/uL Final    MPV 07/24/2025 10.3  7.5 - 12.5 fL Final     ABSOLUTE NEUTROPHILS 07/24/2025 4,949  1,500 - 7,800 cells/uL Final    ABSOLUTE LYMPHOCYTES 07/24/2025 1,605  850 - 3,900 cells/uL Final    ABSOLUTE MONOCYTES 07/24/2025 398  200 - 950 cells/uL Final    ABSOLUTE EOSINOPHILS 07/24/2025 99  15 - 500 cells/uL Final    ABSOLUTE BASOPHILS 07/24/2025 50  0 - 200 cells/uL Final    NEUTROPHILS 07/24/2025 69.7  % Final    LYMPHOCYTES 07/24/2025 22.6  % Final    MONOCYTES 07/24/2025 5.6  % Final    EOSINOPHILS 07/24/2025 1.4  % Final    BASOPHILS 07/24/2025 0.7  % Final    CHOLESTEROL, TOTAL 07/24/2025 168  <200 mg/dL Final    HDL CHOLESTEROL 07/24/2025 75  > OR = 50 mg/dL Final    TRIGLYCERIDES 07/24/2025 70  <150 mg/dL Final    LDL-CHOLESTEROL 07/24/2025 78  mg/dL (calc) Final    Comment: Reference range: <100     Desirable range <100 mg/dL for primary prevention;    <70 mg/dL for patients with CHD or diabetic patients   with > or = 2 CHD risk factors.     LDL-C is now calculated using the Cooper-Barajas   calculation, which is a validated novel method providing   better accuracy than the Friedewald equation in the   estimation of LDL-C.   Cooper SS et al. MORIAH. 2013;310(19): 9923-3218   (http://education.Mebelrama.WAYN/faq/EFQ384)      CHOL/HDLC RATIO 07/24/2025 2.2  <5.0 (calc) Final    NON HDL CHOLESTEROL 07/24/2025 93  <130 mg/dL (calc) Final    Comment: For patients with diabetes plus 1 major ASCVD risk   factor, treating to a non-HDL-C goal of <100 mg/dL   (LDL-C of <70 mg/dL) is considered a therapeutic   option.     Office Visit on 06/02/2025   Component Date Value Ref Range Status    POC Color, Urine 06/02/2025 Phyllis (A)  Straw, Yellow, Light-Yellow Final    POC Appearance, Urine 06/02/2025 Clear  Clear Final    POC Glucose, Urine 06/02/2025 NEGATIVE  NEGATIVE mg/dl Final    POC Bilirubin, Urine 06/02/2025 SMALL (1+) (A)  NEGATIVE Final    POC Ketones, Urine 06/02/2025 TRACE (A)  NEGATIVE mg/dl Final    POC Specific Gravity, Urine 06/02/2025 1.020  1.005 -  1.035 Final    POC Blood, Urine 06/02/2025 TRACE-Intact (A)  NEGATIVE Final    POC PH, Urine 06/02/2025 6.0  No Reference Range Established PH Final    POC Protein, Urine 06/02/2025 NEGATIVE  NEGATIVE mg/dl Final    POC Urobilinogen, Urine 06/02/2025 1.0  0.2, 1.0 EU/DL Final    Poc Nitrite, Urine 06/02/2025 NEGATIVE  NEGATIVE Final    POC Leukocytes, Urine 06/02/2025 NEGATIVE  NEGATIVE Final       Assessment/Plan   Problem List Items Addressed This Visit       Hyperlipidemia    Relevant Medications    atorvastatin (Lipitor) 40 mg tablet    Other Relevant Orders    Albumin-Creatinine Ratio, Urine Random    Comprehensive metabolic panel    Lipid panel    Hemoglobin A1c    Benign essential hypertension    Relevant Medications    metoprolol succinate XL (Toprol-XL) 50 mg 24 hr tablet    Other Relevant Orders    Albumin-Creatinine Ratio, Urine Random    Comprehensive metabolic panel    Lipid panel    Hemoglobin A1c    Depressive disorder    Relevant Medications    DULoxetine (Cymbalta) 30 mg DR capsule    buPROPion XL (Wellbutrin XL) 150 mg 24 hr tablet    Other Relevant Orders    Albumin-Creatinine Ratio, Urine Random    Comprehensive metabolic panel    Lipid panel    Hemoglobin A1c     Other Visit Diagnoses         Screening for malignant neoplasm of colon    -  Primary    Relevant Orders    Colonoscopy Screening; Average Risk Patient    Albumin-Creatinine Ratio, Urine Random    Comprehensive metabolic panel    Lipid panel    Hemoglobin A1c      Type 2 diabetes mellitus without complication, with long-term current use of insulin        Relevant Medications    blood-glucose sensor (FreeStyle Mukund 3 Sensor) device    atorvastatin (Lipitor) 40 mg tablet    Other Relevant Orders    Albumin-Creatinine Ratio, Urine Random    Comprehensive metabolic panel    Lipid panel    Hemoglobin A1c      MCI (mild cognitive impairment)          Routine adult health maintenance          Medicare annual wellness visit, subsequent                 Doing well.  Refilled meds.  Follow up in 6 mo.  Encouraged to cut down on smoking.  Talked to pt about having plans for when she might be able to live independently.  She says that her niece in [facility] invited her to stay at her place.

## 2025-07-29 ENCOUNTER — TELEPHONE (OUTPATIENT)
Facility: CLINIC | Age: 75
End: 2025-07-29
Payer: MEDICARE

## 2025-07-29 NOTE — TELEPHONE ENCOUNTER
Per Phone note from Dr. Olivares on 11.17.2021 Patient is not due for a Colonoscopy until 11.2026, that would be 5 years from her last colonoscopy. Confirm that the Patient is not currently having symptoms.       Left message on machine to return call

## 2025-09-09 ENCOUNTER — APPOINTMENT (OUTPATIENT)
Dept: ENDOCRINOLOGY | Facility: CLINIC | Age: 75
End: 2025-09-09
Payer: MEDICARE

## 2025-10-24 ENCOUNTER — APPOINTMENT (OUTPATIENT)
Dept: UROLOGY | Facility: CLINIC | Age: 75
End: 2025-10-24
Payer: MEDICARE

## 2026-01-28 ENCOUNTER — APPOINTMENT (OUTPATIENT)
Dept: PRIMARY CARE | Facility: CLINIC | Age: 76
End: 2026-01-28
Payer: MEDICARE

## 2026-02-10 ENCOUNTER — APPOINTMENT (OUTPATIENT)
Dept: ORTHOPEDIC SURGERY | Facility: CLINIC | Age: 76
End: 2026-02-10
Payer: MEDICARE

## (undated) DEVICE — GLOVE, SURGICAL, PROTEXIS MICRO, 7.5, PF, LATEX

## (undated) DEVICE — DRAPE, C-ARM IMAGE

## (undated) DEVICE — APPLICATOR, CHLORAPREP, W/ORANGE TINT, 26ML

## (undated) DEVICE — GLOVE, SURGICAL, PI ORTHO PRO, BIOGEL, SZ 8.0

## (undated) DEVICE — SOLUTION, IRRIGATION, SODIUM CHLORIDE 0.9%, 1000 ML, POUR BOTTLE

## (undated) DEVICE — COVER, MAYO STAND, W/PAD, 23 IN, DISPOSABLE, PLASTIC, LF, STERILE

## (undated) DEVICE — ELECTRODE, ELECTROSURGICAL, BLADE, EXTENDED

## (undated) DEVICE — COVER HANDLE LIGHT, STERIS, BLUE, STERILE

## (undated) DEVICE — BLANKET, LOWER BODY, VHA PLUS, ADULT

## (undated) DEVICE — SUTURE, STRATAFIX, SYMMETRIC, SIZE 1, 45CM, KNOTLESS

## (undated) DEVICE — DRAPE, INCISE, ANTIMICROBIAL, IOBAN 2, STERI DRAPE, 23 X 33 IN, DISPOSABLE, STERILE

## (undated) DEVICE — SUTURE, VICRYL, 2-0, 36 IN, CT-1, UNDYED

## (undated) DEVICE — DRAPE, SHEET, U, STERI DRAPE, 47 X 51 IN, DISPOSABLE, STERILE

## (undated) DEVICE — DRILL BIT, 3.3 X 25MM

## (undated) DEVICE — SUTURE, VICRYL, 1, 27 IN, CT-1 CR, UNDYED

## (undated) DEVICE — SUTURE, VICRYL, 1, 36 IN, CT-1, UNDYED

## (undated) DEVICE — GLOVE, PROTEXIS PI CLASSIC, SZ-7.5, PF, LF

## (undated) DEVICE — GUIDEWIRE, SOLO FLEX HYBRID, .035 STRAIGHT TIP

## (undated) DEVICE — SUTURE, STRATAFIX, SPIRAL MONOCRYL PLUS, 3-0, PS-1 45CM, UNDYED

## (undated) DEVICE — DRAPE, SHEET, THREE QUARTER, FAN FOLD, 57 X 77 IN

## (undated) DEVICE — SYRINGE, 50 CC, LUER LOCK

## (undated) DEVICE — CONTAINER, SPECIMEN, 4 OZ, OR PEEL PACK, STERILE

## (undated) DEVICE — DRESSING, MEPILEX BORDER, POST-OP AG, 4 X 10 IN

## (undated) DEVICE — KIT, TOTAL JOINT, CUSTOM, PORTAGE

## (undated) DEVICE — PAD, GROUNDING, ELECTROSURGICAL, W/9 FT CABLE, POLYHESIVE II, ADULT, LF

## (undated) DEVICE — BLADE, SAW, OSC TIP, FALCON, 19.5 X 1.28 X 90MM

## (undated) DEVICE — TIP, SUCTION, FRAZIER, EXTRA WIDE, W/O CONTROL VENT, W/OBTURATOR, 18 FR, DISPOSABLE

## (undated) DEVICE — DRAPE, INSTRUMENT, W/POUCH, STERI DRAPE, 7 X 11 IN, DISPOSABLE, STERILE

## (undated) DEVICE — HOOD, SURGICAL, FLYTE, T7 PLUS, PEEL AWAY SHIELD

## (undated) DEVICE — DRAPE, TIBURON, HIP W/POUCHES

## (undated) DEVICE — DRAPE, INCISE, ANTIMICROBIAL, IOBAN 2, LARGE, 17 X 23 IN, DISPOSABLE, STERILE

## (undated) DEVICE — NEEDLE, HYPODERMIC, REGULAR WALL, REGULAR BEVEL, 18 G X 1.5 IN

## (undated) DEVICE — IRRIGATION SET, CYSTOSCOPY, F/CONSTANT/INTERMITTENT, 8 GTT/CC, 77 IN

## (undated) DEVICE — PREP TRAY, BASIC

## (undated) DEVICE — Device